# Patient Record
Sex: MALE | Race: WHITE | NOT HISPANIC OR LATINO | Employment: OTHER | URBAN - NONMETROPOLITAN AREA
[De-identification: names, ages, dates, MRNs, and addresses within clinical notes are randomized per-mention and may not be internally consistent; named-entity substitution may affect disease eponyms.]

---

## 2017-03-09 ENCOUNTER — TELEMEDICINE2 (OUTPATIENT)
Dept: MEDICAL GROUP | Facility: PHYSICIAN GROUP | Age: 67
End: 2017-03-09
Payer: MEDICARE

## 2017-03-09 ENCOUNTER — TELEMEDICINE ORIGINATING SITE VISIT (OUTPATIENT)
Dept: MEDICAL GROUP | Facility: CLINIC | Age: 67
End: 2017-03-09

## 2017-03-09 VITALS
SYSTOLIC BLOOD PRESSURE: 132 MMHG | BODY MASS INDEX: 29.82 KG/M2 | DIASTOLIC BLOOD PRESSURE: 84 MMHG | HEIGHT: 73 IN | RESPIRATION RATE: 16 BRPM | WEIGHT: 225 LBS | OXYGEN SATURATION: 94 % | HEART RATE: 63 BPM | TEMPERATURE: 98.6 F

## 2017-03-09 DIAGNOSIS — Z01.818 PREOPERATIVE CLEARANCE: ICD-10-CM

## 2017-03-09 DIAGNOSIS — R94.31 ABNORMAL EKG: ICD-10-CM

## 2017-03-09 DIAGNOSIS — I10 ESSENTIAL HYPERTENSION: ICD-10-CM

## 2017-03-09 PROCEDURE — 99203 OFFICE O/P NEW LOW 30 MIN: CPT | Mod: GT | Performed by: NURSE PRACTITIONER

## 2017-03-09 RX ORDER — LOTEPREDNOL ETABONATE 5 MG/ML
SUSPENSION/ DROPS OPHTHALMIC
COMMUNITY
Start: 2017-02-28 | End: 2018-11-19

## 2017-03-09 RX ORDER — LISINOPRIL 20 MG/1
20 TABLET ORAL DAILY
Status: ON HOLD | COMMUNITY
End: 2018-11-28

## 2017-03-09 ASSESSMENT — PATIENT HEALTH QUESTIONNAIRE - PHQ9: CLINICAL INTERPRETATION OF PHQ2 SCORE: 0

## 2017-03-09 NOTE — MR AVS SNAPSHOT
"Romario Chaudhari   3/9/2017 1:20 PM   Telemedicine2   MRN: [id number]    Department:  [facility]   Dept Phone:  [phone]    Description:  Male : 1950   Provider:  ALISTAIR Bernal; TELEMED YESSI           Reason for Visit     Other surgical clearance      Allergies as of 3/9/2017     No Known Allergies      Vital Signs     Blood Pressure Pulse Temperature Respirations Height Weight    132/84 mmHg 63 37 °C (98.6 °F) 16 1.854 m (6' 0.99\") 102.059 kg (225 lb)    Body Mass Index Oxygen Saturation Smoking Status             29.69 kg/m2 94% Never Smoker          Basic Information     Date Of Birth Sex Race Ethnicity Preferred Language    1950 Male White Non- English      Health Maintenance     Patient has no pending health maintenance at this time      Current Immunizations     No immunizations on file.      Below and/or attached are the medications your provider expects you to take. Review all of your home medications and newly ordered medications with your provider and/or pharmacist. Follow medication instructions as directed by your provider and/or pharmacist. Please keep your medication list with you and share with your provider. Update the information when medications are discontinued, doses are changed, or new medications (including over-the-counter products) are added; and carry medication information at all times in the event of emergency situations     Allergies:  No Known Allergies          Medications  Valid as of: 2017 -  1:55 PM    Generic Name Brand Name Tablet Size Instructions for use    Lisinopril (Tab) PRINIVIL 20 MG Take 20 mg by mouth every day.        Loteprednol Etabonate (Suspension) LOTEMAX 0.5 %         Verapamil HCl   Take  by mouth.        .                 Medicines prescribed today were sent to:     [facility] - [place], [place] - [address]    [address] [place] [address]    Phone: [phone] Fax: [phone]    Open 24 " Hours?: No      Medication refill instructions:       If your prescription bottle indicates you have medication refills left, it is not necessary to call your provider’s office. Please contact your pharmacy and they will refill your medication.    If your prescription bottle indicates you do not have any refills left, you may request refills at any time through one of the following ways: The online Turf Ge[employer] system (except Urgent Care), by calling your provider’s office, or by asking your pharmacy to contact your provider’s office with a refill request. Medication refills are processed only during regular business hours and may not be available until the next business day. Your provider may request additional information or to have a follow-up visit with you prior to refilling your medication.   *Please Note: Medication refills are assigned a new Rx number when refilled electronically. Your pharmacy may indicate that no refills were authorized even though a new prescription for the same medication is available at the pharmacy. Please request the medicine by name with the pharmacy before contacting your provider for a refill.           Turf Ge[employer] Club Access Code: [email or web address]  Expires: 4/8/2017  1:55 PM    Turf Geography Club  A secure, online tool to manage your health information     [employer]’s Turf Geography Club® is a secure, online tool that connects you to your personalized health information from the privacy of your home -- day or night - making it very easy for you to manage your healthcare. Once the activation process is completed, you can even access your medical information using the Turf Geography Club maría, which is available for free in the Apple María store or Google Play store.     Turf Geography Club provides the following levels of access (as shown below):   My Chart Features   [facility] Primary [facility] Doctor [facility]  Specialists [facility]  Urgent  Care Non-[facility]  Primary Care  Doctor   Email your healthcare team securely and privately 24/7 X X X    Manage  appointments: schedule your next appointment; view details of past/upcoming appointments X      Request prescription refills. X      View recent personal medical records, including lab and immunizations X X X X   View health record, including health history, allergies, medications X X X X   Read reports about your outpatient visits, procedures, consult and ER notes X X X X   See your discharge summary, which is a recap of your hospital and/or ER visit that includes your diagnosis, lab results, and care plan. X X       How to register for Quinju.com:  1. Go to  [email or web address].  2. Click on the Sign Up Now box, which takes you to the New Member Sign Up page. You will need to provide the following information:  a. Enter your Quinju.com Access Code exactly as it appears at the top of this page. (You will not need to use this code after you’ve completed the sign-up process. If you do not sign up before the expiration date, you must request a new code.)   b. Enter your date of birth.   c. Enter your home email address.   d. Click Submit, and follow the next screen’s instructions.  3. Create a Quinju.com ID. This will be your Quinju.com login ID and cannot be changed, so think of one that is secure and easy to remember.  4. Create a Quinju.com password. You can change your password at any time.  5. Enter your Password Reset Question and Answer. This can be used at a later time if you forget your password.   6. Enter your e-mail address. This allows you to receive e-mail notifications when new information is available in Quinju.com.  7. Click Sign Up. You can now view your health information.    For assistance activating your Quinju.com account, call [phone]

## 2017-03-10 PROBLEM — I10 ESSENTIAL HYPERTENSION: Status: ACTIVE | Noted: 2017-03-10

## 2017-03-10 PROBLEM — Z01.818 PREOPERATIVE CLEARANCE: Status: ACTIVE | Noted: 2017-03-10

## 2017-03-10 PROBLEM — R94.31 ABNORMAL EKG: Status: ACTIVE | Noted: 2017-03-10

## 2017-03-11 NOTE — ASSESSMENT & PLAN NOTE
Patient is here to obtain clearance for a pending surgery. He is under the impression that I can clear him for pending orthopedic surgery. He is visible upset regarding the fact that I cannot clear him of his cardiac condition. Apparently, he is a patient of a local MD in [place], Dr. Burns. The paper work I have is a piece of paper requesting Cardiologist's clearance for surgery.  Patient states he has a known problem with his heart, and is cleared every time for surgery. I reviewed his EKG's and for the past 4 years they have had changes.   I explained to the patient he will have to see the Cardiologist. He was not very happy regarding this requirement and had words with our staff. I later asked him to please apologize to staff. I will refer him to Cardiology, unfortunately I was unable to to exam and obtain a thorough history.

## 2017-03-13 NOTE — PROGRESS NOTES
Chief Complaint   Patient presents with   • Other     surgical clearance       HISTORY OF PRESENT ILLNESS: Patient is a 66 y.o. male [place], NEW patient, who presents today to discuss:  Verified Identification with patient.  Secured video conference with RN presenter in [place], [place]        Preoperative clearance  Patient is here to obtain clearance for a pending surgery. He is under the impression that I can clear him for pending orthopedic surgery. He is visible upset regarding the fact that I cannot clear him of his cardiac condition. Apparently, he is a patient of a local MD in [place], Dr. Burns. The paper work I have is a piece of paper requesting Cardiologist's clearance for surgery.  Patient states he has a known problem with his heart, and is cleared every time for surgery. I reviewed his EKG's and for the past 4 years they have had changes.   I explained to the patient he will have to see the Cardiologist. He was not very happy regarding this requirement and had words with our staff. I later asked him to please apologize to staff. I will refer him to Cardiology, unfortunately I was unable to to exam and obtain a thorough history.     Essential hypertension  Patient is identified a having HTN. He is taking Lisinopril and Verapamil.   No chest pain or SOB.     Abnormal EKG  Patient has 4 EKGS to compare . The most recent EKG is Jan 2017. Patient has RBB with AV block first degree.         Allergies:Review of patient's allergies indicates no known allergies.    Current Outpatient Prescriptions Ordered in [facility]   Medication Sig Dispense Refill   • lisinopril (PRINIVIL) 20 MG Tab Take 20 mg by mouth every day.     • VERAPAMIL HCL ER PO Take  by mouth.     • LOTEMAX 0.5 % ophthalmic suspension        No current Epic-ordered facility-administered medications on file.       Past Medical History   Diagnosis Date   • Hypertension        Social History   Substance Use Topics   • Smoking status: Never Smoker    •  "Smokeless tobacco: Never Used   • Alcohol Use: No       No family status information on file.   History reviewed. No pertinent family history.    ROS: As documented in my HPI      Exam:  Blood pressure 132/84, pulse 63, temperature 37 °C (98.6 °F), resp. rate 16, height 1.854 m (6' 0.99\"), weight 102.059 kg (225 lb), SpO2 94 %.  General:  Well nourished, well developed male .  Head: No lesions  Hard of hearing. Wears hearing aids.  Neck: Supple.   Pulmonary:  Normal effort.   Cardiovascular: Regular rate   Extremities: no clubbing, cyanosis, or edema.  Psych: Alert and oriented x3. IRRITABLE MOOD  Neurological: No focal deficits    Please note that this dictation was created using voice recognition software. I have made every reasonable attempt to correct obvious errors, but I expect that there are errors of grammar and possibly content that I did not discover before finalizing the note.    Assessment/Plan:  1. Preoperative clearance   Cardiology referral    2. Essential hypertension  Current status of condition is chronic and controlled on therapy.     3. Abnormal EKG  Referred to cardiology            "

## 2017-03-13 NOTE — ASSESSMENT & PLAN NOTE
Patient has 4 EKGS to compare . The most recent EKG is Jan 2017. Patient has RBB with AV block first degree.

## 2017-03-13 NOTE — ASSESSMENT & PLAN NOTE
Patient is identified a having HTN. He is taking Lisinopril and Verapamil.   No chest pain or SOB.

## 2017-03-22 ENCOUNTER — TELEPHONE (OUTPATIENT)
Dept: CARDIOLOGY | Facility: MEDICAL CENTER | Age: 67
End: 2017-03-22

## 2017-03-22 ENCOUNTER — TELEMEDICINE ORIGINATING SITE VISIT (OUTPATIENT)
Dept: MEDICAL GROUP | Facility: CLINIC | Age: 67
End: 2017-03-22
Payer: MEDICARE

## 2017-03-22 ENCOUNTER — TELEMEDICINE2 (OUTPATIENT)
Dept: SCHEDULING | Facility: IMAGING CENTER | Age: 67
End: 2017-03-22
Payer: MEDICARE

## 2017-03-22 ENCOUNTER — NON-PROVIDER VISIT (OUTPATIENT)
Dept: MEDICAL GROUP | Facility: CLINIC | Age: 67
End: 2017-03-22
Payer: MEDICARE

## 2017-03-22 VITALS
TEMPERATURE: 96.9 F | RESPIRATION RATE: 16 BRPM | WEIGHT: 224 LBS | SYSTOLIC BLOOD PRESSURE: 128 MMHG | OXYGEN SATURATION: 94 % | DIASTOLIC BLOOD PRESSURE: 86 MMHG | HEIGHT: 73 IN | HEART RATE: 64 BPM | BODY MASS INDEX: 29.69 KG/M2

## 2017-03-22 DIAGNOSIS — I10 ESSENTIAL HYPERTENSION: ICD-10-CM

## 2017-03-22 DIAGNOSIS — Z01.818 PREOPERATIVE CLEARANCE: ICD-10-CM

## 2017-03-22 DIAGNOSIS — R94.31 ABNORMAL EKG: ICD-10-CM

## 2017-03-22 LAB — EKG 4674: NORMAL

## 2017-03-22 PROCEDURE — 93005 ELECTROCARDIOGRAM TRACING: CPT | Performed by: FAMILY MEDICINE

## 2017-03-22 PROCEDURE — 99204 OFFICE O/P NEW MOD 45 MIN: CPT | Mod: GT | Performed by: INTERNAL MEDICINE

## 2017-03-22 ASSESSMENT — ENCOUNTER SYMPTOMS
ABDOMINAL PAIN: 0
BLURRED VISION: 0
NAUSEA: 0
NERVOUS/ANXIOUS: 1
COUGH: 0
LOSS OF CONSCIOUSNESS: 0
DEPRESSION: 1
HEMOPTYSIS: 0
PALPITATIONS: 0
FEVER: 0
WHEEZING: 0
EYE PAIN: 0
BRUISES/BLEEDS EASILY: 0
CHILLS: 0
SPEECH CHANGE: 0
VOMITING: 0
MYALGIAS: 0
EYE DISCHARGE: 0

## 2017-03-22 NOTE — MR AVS SNAPSHOT
"Romario Chaudhari   3/22/2017 9:00 AM   Telemedicine2   MRN: [id number]    Department:  [facility]   Dept Phone:  [phone]    Description:  Male : 1950   [facility]  [facility]           Reason for Visit     New Patient           Allergies as of 3/22/2017     No Known Allergies      You were diagnosed with     Preoperative clearance   [758155]       Essential hypertension   [2965174]       Abnormal EKG   [786202]         Vital Signs     Blood Pressure Pulse Temperature Respirations Height Weight    128/86 mmHg 64 36.1 °C (96.9 °F) 16 1.854 m (6' 1\") 101.606 kg (224 lb)    Body Mass Index Oxygen Saturation Smoking Status             29.56 kg/m2 94% Never Smoker          Basic Information     Date Of Birth Sex Race Ethnicity Preferred Language    1950 Male White Non- English      Your appointments     Mar 23, 2017  2:20 PM   Telemedicine Clinic Established Pt with HERNAN BernalPJESSICA, [facility]    [address] [place] [id number] [phone]              Problem List              ICD-10-CM Priority Class Noted - Resolved    Preoperative clearance Z01.818   3/10/2017 - Present    Essential hypertension I10   3/10/2017 - Present    Abnormal EKG R94.31   3/10/2017 - Present      Health Maintenance        Date Due Completion Dates    IMM DTaP/Tdap/Td Vaccine (1 - Tdap) 1969 ---    COLONOSCOPY 2000 ---    IMM ZOSTER VACCINE 2010 ---    IMM PNEUMOCOCCAL 65+ (ADULT) LOW/MEDIUM RISK SERIES (1 of 2 - PCV13) 2015 ---    IMM INFLUENZA (1) 2016 ---            Current Immunizations     No immunizations on file.      Below and/or attached are the medications your provider expects you to take. Review all of your home medications and newly ordered medications with your provider and/or pharmacist. Follow medication instructions as directed by your " provider and/or pharmacist. Please keep your medication list with you and share with your provider. Update the information when medications are discontinued, doses are changed, or new medications (including over-the-counter products) are added; and carry medication information at all times in the event of emergency situations     Allergies:  No Known Allergies          Medications  Valid as of: March 22, 2017 -  9:43 AM    Generic Name Brand Name Tablet Size Instructions for use    Lisinopril (Tab) PRINIVIL 20 MG Take 20 mg by mouth every day.        Loteprednol Etabonate (Suspension) LOTEMAX 0.5 %         Verapamil HCl   Take  by mouth.        .                 Medicines prescribed today were sent to:     [facility] - [place], [place] - [address]    [address] [place] [address]    Phone: [phone] Fax: [phone]    Open 24 Hours?: No      Medication refill instructions:       If your prescription bottle indicates you have medication refills left, it is not necessary to call your provider’s office. Please contact your pharmacy and they will refill your medication.    If your prescription bottle indicates you do not have any refills left, you may request refills at any time through one of the following ways: The online [employer]co system (except Urgent Care), by calling your provider’s office, or by asking your pharmacy to contact your provider’s office with a refill request. Medication refills are processed only during regular business hours and may not be available until the next business day. Your provider may request additional information or to have a follow-up visit with you prior to refilling your medication.   *Please Note: Medication refills are assigned a new Rx number when refilled electronically. Your pharmacy may indicate that no refills were authorized even though a new prescription for the same medication is available at the pharmacy. Please request the medicine by name with the  pharmacy before contacting your provider for a refill.           [employer] Access Code: [email or web address]  Expires: 4/8/2017  2:55 PM    [employer]  A secure, online tool to manage your health information     [employer]’s Futura [employer]® is a secure, online tool that connects you to your personalized health information from the privacy of your home -- day or night - making it very easy for you to manage your healthcare. Once the activation process is completed, you can even access your medical information using the Futura [employer] maría, which is available for free in the Apple María store or Google Play store.     Fut[employer] provides the following levels of access (as shown below):   My Chart Features   [facility] Primary Care Doctor [facility]  Specialists [facility]  Urgent  Care Non-[facility]  Primary Care  Doctor   Email your healthcare team securely and privately 24/7 X X X    Manage appointments: schedule your next appointment; view details of past/upcoming appointments X      Request prescription refills. X      View recent personal medical records, including lab and immunizations X X X X   View health record, including health history, allergies, medications X X X X   Read reports about your outpatient visits, procedures, consult and ER notes X X X X   See your discharge summary, which is a recap of your hospital and/or ER visit that includes your diagnosis, lab results, and care plan. X X       How to register for [employer]:  1. Go to  [email or web address].  2. Click on the Sign Up Now box, which takes you to the New Member Sign Up page. You will need to provide the following information:  a. Enter your [employer] Access Code exactly as it appears at the top of this page. (You will not need to use this code after you’ve completed the sign-up process. If you do not sign up before the expiration date, you must request a new code.)   b. Enter your date of birth.   c. Enter your home email address.   d. Click Submit, and follow the next screen’s  instructions.  3. Create a LeddarTecht ID. This will be your LeddarTecht login ID and cannot be changed, so think of one that is secure and easy to remember.  4. Create a LeddarTecht password. You can change your password at any time.  5. Enter your Password Reset Question and Answer. This can be used at a later time if you forget your password.   6. Enter your e-mail address. This allows you to receive e-mail notifications when new information is available in [employer].  7. Click Sign Up. You can now view your health information.    For assistance activating your [employer] account, call [phone]

## 2017-03-22 NOTE — PROGRESS NOTES
Romario Chaudhari is a 66 y.o. male here for a non-provider visit for EKG     If abnormal was an in office provider notified today (if so, indicate provider)? Yes  Routed to PCP? Yes

## 2017-03-22 NOTE — MR AVS SNAPSHOT
Romario Chaudhari   3/22/2017 8:45 AM   Non-Provider Visit   MRN: [id number]    Department:  [facility]   Dept Phone:  [phone]    Description:  Male : 1950   Provider:  [place] G[place]           Reason for Visit     Other Surgical Clearance      Allergies as of 3/22/2017     No Known Allergies      You were diagnosed with     Abnormal EKG   [283398]         Vital Signs     Smoking Status                   Never Smoker            Basic Information     Date Of Birth Sex Race Ethnicity Preferred Language    1950 Male White Non- English      Your appointments     Mar 22, 2017  9:00 AM   Telemedicine Clinic New Patient with Beck Judge M.D., [facility]   Centralized Scheduling (--)    [address]  [place] [id number] [phone]              Problem List              ICD-10-CM Priority Class Noted - Resolved    Preoperative clearance Z01.818   3/10/2017 - Present    Essential hypertension I10   3/10/2017 - Present    Abnormal EKG R94.31   3/10/2017 - Present      Health Maintenance        Date Due Completion Dates    IMM DTaP/Tdap/Td Vaccine (1 - Tdap) 1969 ---    COLONOSCOPY 2000 ---    IMM ZOSTER VACCINE 2010 ---    IMM PNEUMOCOCCAL 65+ (ADULT) LOW/MEDIUM RISK SERIES (1 of 2 - PCV13) 2015 ---    IMM INFLUENZA (1) 2016 ---            Current Immunizations     No immunizations on file.      Below and/or attached are the medications your provider expects you to take. Review all of your home medications and newly ordered medications with your provider and/or pharmacist. Follow medication instructions as directed by your provider and/or pharmacist. Please keep your medication list with you and share with your provider. Update the information when medications are discontinued, doses are changed, or new medications (including over-the-counter products) are added; and carry medication information at all times in  the event of emergency situations     Allergies:  No Known Allergies          Medications  Valid as of: March 22, 2017 -  8:56 AM    Generic Name Brand Name Tablet Size Instructions for use    Lisinopril (Tab) PRINIVIL 20 MG Take 20 mg by mouth every day.        Loteprednol Etabonate (Suspension) LOTEMAX 0.5 %         Verapamil HCl   Take  by mouth.        .                 Medicines prescribed today were sent to:     [facility] - [place], [place] - [address]    [address] [place] [address]    Phone: [phone] Fax: [phone]    Open 24 Hours?: No      Medication refill instructions:       If your prescription bottle indicates you have medication refills left, it is not necessary to call your provider’s office. Please contact your pharmacy and they will refill your medication.    If your prescription bottle indicates you do not have any refills left, you may request refills at any time through one of the following ways: The online [employer] system (except Urgent Care), by calling your provider’s office, or by asking your pharmacy to contact your provider’s office with a refill request. Medication refills are processed only during regular business hours and may not be available until the next business day. Your provider may request additional information or to have a follow-up visit with you prior to refilling your medication.   *Please Note: Medication refills are assigned a new Rx number when refilled electronically. Your pharmacy may indicate that no refills were authorized even though a new prescription for the same medication is available at the pharmacy. Please request the medicine by name with the pharmacy before contacting your provider for a refill.           [employer] Access Code: [email or web address]  Expires: 4/8/2017  2:55 PM    [employer]  A secure, online tool to manage your health information     [employer]’s MapR Technologies® is a secure, online tool that connects you to your personalized  health information from the privacy of your home -- day or night - making it very easy for you to manage your healthcare. Once the activation process is completed, you can even access your medical information using the FlameStower maría, which is available for free in the Apple María store or Google Play store.     FlameStower provides the following levels of access (as shown below):   My Chart Features   [facility] Primary [facility] Doctor [facility]  Specialists [facility]  Urgent  Care Non-[facility]  Primary Care  Doctor   Email your healthcare team securely and privately 24/7 X X X    Manage appointments: schedule your next appointment; view details of past/upcoming appointments X      Request prescription refills. X      View recent personal medical records, including lab and immunizations X X X X   View health record, including health history, allergies, medications X X X X   Read reports about your outpatient visits, procedures, consult and ER notes X X X X   See your discharge summary, which is a recap of your hospital and/or ER visit that includes your diagnosis, lab results, and care plan. X X       How to register for FlameStower:  1. Go to  [email or web address].  2. Click on the Sign Up Now box, which takes you to the New Member Sign Up page. You will need to provide the following information:  a. Enter your FlameStower Access Code exactly as it appears at the top of this page. (You will not need to use this code after you’ve completed the sign-up process. If you do not sign up before the expiration date, you must request a new code.)   b. Enter your date of birth.   c. Enter your home email address.   d. Click Submit, and follow the next screen’s instructions.  3. Create a FlameStower ID. This will be your FlameStower login ID and cannot be changed, so think of one that is secure and easy to remember.  4. Create a FlameStower password. You can change your password at any time.  5. Enter your Password Reset Question and Answer. This can be used at a  later time if you forget your password.   6. Enter your e-mail address. This allows you to receive e-mail notifications when new information is available in [employer].  7. Click Sign Up. You can now view your health information.    For assistance activating your [employer] account, call [phone]

## 2017-03-22 NOTE — PROGRESS NOTES
Subjective:   Romario Chaudhari is a 66 y.o. male who presents today for new patient evaluation. He needs surgical clearance for orthopedic surgery on his right arm. He has a history of hypertension and a right bundle branch block.    He has had no chest discomfort, dyspnea on exertion, PND or edema. He denies any palpitations or lightheadedness.      Past Medical History   Diagnosis Date   • Hypertension    • Diabetes (CMS-HCC)      Past Surgical History   Procedure Laterality Date   • Cervical laminectomy posterior     • Lumbar laminectomy diskectomy     • Carpal tunnel release Bilateral    • Nerve ulnar transfer Bilateral      Family History   Problem Relation Age of Onset   • Heart Attack Father 86     History   Smoking status   • Never Smoker    Smokeless tobacco   • Never Used     No Known Allergies  Outpatient Encounter Prescriptions as of 3/22/2017   Medication Sig Dispense Refill   • lisinopril (PRINIVIL) 20 MG Tab Take 20 mg by mouth every day.     • VERAPAMIL HCL ER PO Take  by mouth.     • LOTEMAX 0.5 % ophthalmic suspension        No facility-administered encounter medications on file as of 3/22/2017.     Review of Systems   Constitutional: Negative for fever and chills.   HENT: Positive for congestion and hearing loss.    Eyes: Negative for blurred vision, pain and discharge.   Respiratory: Negative for cough, hemoptysis and wheezing.    Cardiovascular: Negative for chest pain and palpitations.   Gastrointestinal: Negative for nausea, vomiting and abdominal pain.   Musculoskeletal: Positive for joint pain (especially knees). Negative for myalgias.   Skin: Negative for itching and rash.   Neurological: Negative for speech change and loss of consciousness.   Endo/Heme/Allergies: Does not bruise/bleed easily.   Psychiatric/Behavioral: Positive for depression. The patient is nervous/anxious.    All other systems reviewed and are negative.       Objective:   /86 mmHg  Pulse 64  Temp(Src) 36.1 °C (96.9  "°F)  Resp 16  Ht 1.854 m (6' 1\")  Wt 101.606 kg (224 lb)  BMI 29.56 kg/m2  SpO2 94%    Physical Exam   Constitutional: He is oriented to person, place, and time. He appears well-developed. No distress.   HENT:   Mouth/Throat: Mucous membranes are normal.   Eyes: Conjunctivae and EOM are normal.   Neck: No JVD present. No tracheal deviation present. No thyroid mass and no thyromegaly present.   Cardiovascular: Normal rate, regular rhythm and intact distal pulses.    No murmur heard.  Pulmonary/Chest: Effort normal and breath sounds normal. No respiratory distress. He exhibits no tenderness.   Abdominal: Soft. There is no tenderness.   Musculoskeletal: Normal range of motion. He exhibits no edema.   Neurological: He is alert and oriented to person, place, and time. He has normal strength. He displays no tremor.   Skin: Skin is warm and dry. He is not diaphoretic.   Psychiatric: He has a normal mood and affect. His behavior is normal.   Vitals reviewed.    EKG from March 22: This shows a normal sinus rhythm with a right bundle branch block pattern.    Assessment:     1. Preoperative clearance     2. Essential hypertension     3. Abnormal EKG         Medical Decision Making:  Today's Assessment / Status / Plan:     Mr. Chaudhari is clinically stable. I feel he can proceed to general anesthesia and orthopedic surgery without any further cardiac evaluation. However, he does have hypertension and a mildly abnormal EKG. We discussed further evaluation in the future. I feel that he should have a echocardiogram in the future. [facility] [place] and [facility] get in touch with them about evaluation.  "

## 2017-03-23 ENCOUNTER — TELEMEDICINE2 (OUTPATIENT)
Dept: MEDICAL GROUP | Facility: PHYSICIAN GROUP | Age: 67
End: 2017-03-23
Payer: MEDICARE

## 2017-03-23 ENCOUNTER — APPOINTMENT (OUTPATIENT)
Dept: RADIOLOGY | Facility: IMAGING CENTER | Age: 67
End: 2017-03-23
Attending: NURSE PRACTITIONER
Payer: MEDICARE

## 2017-03-23 ENCOUNTER — TELEMEDICINE ORIGINATING SITE VISIT (OUTPATIENT)
Dept: MEDICAL GROUP | Facility: CLINIC | Age: 67
End: 2017-03-23
Payer: MEDICARE

## 2017-03-23 VITALS
TEMPERATURE: 98.1 F | BODY MASS INDEX: 29.29 KG/M2 | HEART RATE: 88 BPM | RESPIRATION RATE: 16 BRPM | DIASTOLIC BLOOD PRESSURE: 84 MMHG | HEIGHT: 73 IN | SYSTOLIC BLOOD PRESSURE: 131 MMHG | OXYGEN SATURATION: 97 % | WEIGHT: 221 LBS

## 2017-03-23 DIAGNOSIS — M25.511 ACUTE PAIN OF RIGHT SHOULDER: ICD-10-CM

## 2017-03-23 DIAGNOSIS — Z01.818 PREOPERATIVE CLEARANCE: ICD-10-CM

## 2017-03-23 DIAGNOSIS — R94.31 ABNORMAL EKG: ICD-10-CM

## 2017-03-23 DIAGNOSIS — I10 ESSENTIAL HYPERTENSION: ICD-10-CM

## 2017-03-23 PROCEDURE — 73030 X-RAY EXAM OF SHOULDER: CPT | Mod: 26,RT | Performed by: FAMILY MEDICINE

## 2017-03-23 PROCEDURE — 99213 OFFICE O/P EST LOW 20 MIN: CPT | Mod: GT | Performed by: NURSE PRACTITIONER

## 2017-03-23 ASSESSMENT — PATIENT HEALTH QUESTIONNAIRE - PHQ9: CLINICAL INTERPRETATION OF PHQ2 SCORE: 0

## 2017-03-23 NOTE — ASSESSMENT & PLAN NOTE
This is a 66-year-old male who is here reporting acute pain to his shoulder. He reports that 2 months ago he was on his abdomen reaching enclosed space and felt pain in the shoulder. He tried to rehabilitation it back on his own still is having decreased movement, pain, and weakness. He has a history of shoulder injury years back. And was told that his rotator cuff on the right side was barely operable.  His left side was repaired right remains with continued problem.    Patient is planning a surgery on his hand with a hand surgeon next month. He is not sure that this particular physician will be handling the rest of his orthopedic problems.    I outlined a plan. Patient will obtain x-ray of shoulder today followed by MRI which needs to be open.

## 2017-03-23 NOTE — ASSESSMENT & PLAN NOTE
Symptoms:  Headache NO , Weakness NO , Visual loss NO , Chest pain NO , Dyspnea NO , Claudication NO Swelling NO   Taking medication: Lisinopril 20 mg   Diet : standard diet.  Exercise: limited.

## 2017-03-23 NOTE — ASSESSMENT & PLAN NOTE
Patient was seen by cardiology and given Pre-operative clearance.  It looks like his cardiologist Dr. Mei gave clearance and sent paperwork.  Patient is to follow up after surgery.

## 2017-03-23 NOTE — ASSESSMENT & PLAN NOTE
Today patient's notes are read regarding EKG. RBBB, patient is asked to come back to be seen by cardiology. Patient agrees to address.

## 2017-03-23 NOTE — PROGRESS NOTES
Chief Complaint   Patient presents with   • Shoulder Pain       HISTORY OF PRESENT ILLNESS: Patient is a 66 y.o. male [facility] established patient, who presents today to discuss:  Verified Identification with patient.  Secured video conference with RN presenter in [place], [place]        Preoperative clearance  Patient was seen by cardiology and given Pre-operative clearance.  It looks like his cardiologist Dr. Mei gave clearance and sent paperwork.  Patient is to follow up after surgery.     Essential hypertension  Symptoms:  Headache NO , Weakness NO , Visual loss NO , Chest pain NO , Dyspnea NO , Claudication NO Swelling NO   Taking medication: Lisinopril 20 mg   Diet : standard diet.  Exercise: limited.       Abnormal EKG  Today patient's notes are read regarding EKG. RBBB, patient is asked to come back to be seen by cardiology. Patient agrees to address.     Acute pain of right shoulder  This is a 66-year-old male who is here reporting acute pain to his shoulder. He reports that 2 months ago he was on his abdomen reaching enclosed space and felt pain in the shoulder. He tried to rehabilitation it back on his own still is having decreased movement, pain, and weakness. He has a history of shoulder injury years back. And was told that his rotator cuff on the right side was barely operable.  His left side was repaired right remains with continued problem.    Patient is planning a surgery on his hand with a hand surgeon next month. He is not sure that this particular physician will be handling the rest of his orthopedic problems.    I outlined a plan. Patient will obtain x-ray of shoulder today followed by MRI which needs to be open.        Allergies:Review of patient's allergies indicates no known allergies.    Current Outpatient Prescriptions Ordered in [facility]   Medication Sig Dispense Refill   • lisinopril (PRINIVIL) 20 MG Tab Take 20 mg by mouth every day.     • VERAPAMIL HCL ER PO Take  by mouth.     • LOTEMAX  "0.5 % ophthalmic suspension        No current Epic-ordered facility-administered medications on file.       Past Medical History   Diagnosis Date   • Hypertension    • Diabetes (CMS-HCC)    • Upper GI bleed 2007       Social History   Substance Use Topics   • Smoking status: Never Smoker    • Smokeless tobacco: Never Used   • Alcohol Use: No       No family status information on file.     Family History   Problem Relation Age of Onset   • Heart Attack Father 86       ROS: As documented in my HPI      Exam:  Blood pressure 131/84, pulse 88, temperature 36.7 °C (98.1 °F), resp. rate 16, height 1.854 m (6' 0.99\"), weight 100.245 kg (221 lb), SpO2 97 %.  General:  Well nourished, well developed male in NAD  Head: No lesions, Non tender scalp  Neck: Supple  Pulmonary:  Normal effort.   Cardiovascular: Regular rate   Extremities: Right shoulder. Painful abduction. Limited range of motion cannot got beyond 45°. Strength and tone 3 out of 5 right side. Gross sensation decreased.  Psych: Alert and oriented x3. Normal mood and affect.   Neurological: No focal deficits    Please note that this dictation was created using voice recognition software. I have made every reasonable attempt to correct obvious errors, but I expect that there are errors of grammar and possibly content that I did not discover before finalizing the note.    Assessment/Plan:  1. Preoperative clearance   cleared per cardiology    2. Acute pain of right shoulder  DX-SHOULDER 2+ RIGHT    MR-SHOULDER-W/O RIGHT   3. Essential hypertension   Current status of condition is chronic and controlled on therapy.     4. Abnormal EKG   will follow up with cardiology after surgery.             "

## 2017-03-23 NOTE — MR AVS SNAPSHOT
"Romario Chaudhari   3/23/2017 2:20 PM   Telemedicine2   MRN: [id number]    Department:  [facility]   Dept Phone:  [phone]    Description:  Male : 1950   Provider:  ALISTAIR Bernal; TELEMED [place]           Reason for Visit     Shoulder Pain           Allergies as of 3/23/2017     No Known Allergies      You were diagnosed with     Preoperative clearance   [553242]       Acute pain of right shoulder   [4199635]       Essential hypertension   [0722198]       Abnormal EKG   [209925]         Vital Signs     Blood Pressure Pulse Temperature Respirations Height Weight    131/84 mmHg 88 36.7 °C (98.1 °F) 16 1.854 m (6' 0.99\") 100.245 kg (221 lb)    Body Mass Index Oxygen Saturation Smoking Status             29.16 kg/m2 97% Never Smoker          Basic Information     Date Of Birth Sex Race Ethnicity Preferred Language    1950 Male White Non- English      Your appointments     Mar 23, 2017  2:45 PM   [place] 15 with [place]   [facility] [place]    [address]  [place] [address]                 Problem List              ICD-10-CM Priority Class Noted - Resolved    Preoperative clearance Z01.818   3/10/2017 - Present    Essential hypertension I10   3/10/2017 - Present    Abnormal EKG R94.31   3/10/2017 - Present    Acute pain of right shoulder M25.511   3/23/2017 - Present      Health Maintenance        Date Due Completion Dates    IMM DTaP/Tdap/Td Vaccine (1 - Tdap) 1969 ---    COLONOSCOPY 2000 ---    IMM ZOSTER VACCINE 2010 ---    IMM PNEUMOCOCCAL 65+ (ADULT) LOW/MEDIUM RISK SERIES (1 of 2 - PCV13) 2015 ---    IMM INFLUENZA (1) 2016 ---            Current Immunizations     No immunizations on file.      Below and/or attached are the medications your provider expects you to take. Review all of your home medications and newly ordered medications with your provider and/or pharmacist. Follow medication instructions as directed by your " provider and/or pharmacist. Please keep your medication list with you and share with your provider. Update the information when medications are discontinued, doses are changed, or new medications (including over-the-counter products) are added; and carry medication information at all times in the event of emergency situations     Allergies:  No Known Allergies          Medications  Valid as of: March 23, 2017 -  2:42 PM    Generic Name Brand Name Tablet Size Instructions for use    Lisinopril (Tab) PRINIVIL 20 MG Take 20 mg by mouth every day.        Loteprednol Etabonate (Suspension) LOTEMAX 0.5 %         Verapamil HCl   Take  by mouth.        .                 Medicines prescribed today were sent to:     [facility] - [place], [place] - [address]    [address] [place] [address]    Phone: [phone] Fax: [phone]    Open 24 Hours?: No      Medication refill instructions:       If your prescription bottle indicates you have medication refills left, it is not necessary to call your provider’s office. Please contact your pharmacy and they will refill your medication.    If your prescription bottle indicates you do not have any refills left, you may request refills at any time through one of the following ways: The online Jetaport system (except Urgent Care), by calling your provider’s office, or by asking your pharmacy to contact your provider’s office with a refill request. Medication refills are processed only during regular business hours and may not be available until the next business day. Your provider may request additional information or to have a follow-up visit with you prior to refilling your medication.   *Please Note: Medication refills are assigned a new Rx number when refilled electronically. Your pharmacy may indicate that no refills were authorized even though a new prescription for the same medication is available at the pharmacy. Please request the medicine by name with the  pharmacy before contacting your provider for a refill.        Your To Do List     Future Labs/Procedures Complete By Expires    DX-SHOULDER 2+ RIGHT  As directed 3/23/2018    MR-SHOULDER-W/O RIGHT  As directed 3/23/2018         Zaplee Access Code: [email or web address]  Expires: 4/8/2017  2:55 PM    Zaplee  A secure, online tool to manage your health information     [employer]’s Zaplee® is a secure, online tool that connects you to your personalized health information from the privacy of your home -- day or night - making it very easy for you to manage your healthcare. Once the activation process is completed, you can even access your medical information using the Zaplee maría, which is available for free in the Apple María store or Google Play store.     Zaplee provides the following levels of access (as shown below):   My Chart Features   [facility]  Urgent  Care [facility]  Primary Care  Doctor   Email your healthcare team securely and privately 24/7 X X X    Manage appointments: schedule your next appointment; view details of past/upcoming appointments X      Request prescription refills. X      View recent personal medical records, including lab and immunizations X X X X   View health record, including health history, allergies, medications X X X X   Read reports about your outpatient visits, procedures, consult and ER notes X X X X   See your discharge summary, which is a recap of your hospital and/or ER visit that includes your diagnosis, lab results, and care plan. X X       How to register for Zaplee:  1. Go to  [email or web address].  2. Click on the Sign Up Now box, which takes you to the New Member Sign Up page. You will need to provide the following information:  a. Enter your Zaplee Access Code exactly as it appears at the top of this page. (You will not need to use this code after you’ve completed the sign-up process. If you do not sign up before the  expiration date, you must request a new code.)   b. Enter your date of birth.   c. Enter your home email address.   d. Click Submit, and follow the next screen’s instructions.  3. Create a Numecent ID. This will be your Numecent login ID and cannot be changed, so think of one that is secure and easy to remember.  4. Create a Numecent password. You can change your password at any time.  5. Enter your Password Reset Question and Answer. This can be used at a later time if you forget your password.   6. Enter your e-mail address. This allows you to receive e-mail notifications when new information is available in Numecent.  7. Click Sign Up. You can now view your health information.    For assistance activating your Numecent account, call [phone]

## 2017-03-24 ENCOUNTER — TELEPHONE (OUTPATIENT)
Dept: MEDICAL GROUP | Facility: PHYSICIAN GROUP | Age: 67
End: 2017-03-24

## 2017-03-24 NOTE — TELEPHONE ENCOUNTER
Shoulder shows : osteoarthritis of shoulder.  No fracture.  No deformity.  Only commented on bones.  Richelle Ulloa

## 2018-11-19 ENCOUNTER — HOSPITAL ENCOUNTER (INPATIENT)
Facility: MEDICAL CENTER | Age: 68
LOS: 10 days | DRG: 233 | End: 2018-11-29
Attending: EMERGENCY MEDICINE | Admitting: HOSPITALIST
Payer: COMMERCIAL

## 2018-11-19 DIAGNOSIS — J95.821 ACUTE RESPIRATORY FAILURE FOLLOWING TRAUMA AND SURGERY (HCC): ICD-10-CM

## 2018-11-19 DIAGNOSIS — J18.9 PNEUMONIA OF LEFT LOWER LOBE DUE TO INFECTIOUS ORGANISM: ICD-10-CM

## 2018-11-19 DIAGNOSIS — R94.31 ABNORMAL EKG: ICD-10-CM

## 2018-11-19 DIAGNOSIS — I21.4 NSTEMI (NON-ST ELEVATED MYOCARDIAL INFARCTION) (HCC): ICD-10-CM

## 2018-11-19 DIAGNOSIS — G89.18 ACUTE POST-OPERATIVE PAIN: ICD-10-CM

## 2018-11-19 PROBLEM — E66.09 CLASS 1 OBESITY DUE TO EXCESS CALORIES WITHOUT SERIOUS COMORBIDITY WITH BODY MASS INDEX (BMI) OF 30.0 TO 30.9 IN ADULT: Status: ACTIVE | Noted: 2018-11-19

## 2018-11-19 PROBLEM — E66.811 CLASS 1 OBESITY DUE TO EXCESS CALORIES WITHOUT SERIOUS COMORBIDITY WITH BODY MASS INDEX (BMI) OF 30.0 TO 30.9 IN ADULT: Status: ACTIVE | Noted: 2018-11-19

## 2018-11-19 LAB
APTT PPP: 59.7 SEC (ref 24.7–36)
INR PPP: 1.03 (ref 0.87–1.13)
PROTHROMBIN TIME: 13.6 SEC (ref 12–14.6)
TROPONIN I SERPL-MCNC: 0.52 NG/ML (ref 0–0.04)

## 2018-11-19 PROCEDURE — 700105 HCHG RX REV CODE 258: Performed by: HOSPITALIST

## 2018-11-19 PROCEDURE — 700102 HCHG RX REV CODE 250 W/ 637 OVERRIDE(OP): Performed by: INTERNAL MEDICINE

## 2018-11-19 PROCEDURE — 36415 COLL VENOUS BLD VENIPUNCTURE: CPT

## 2018-11-19 PROCEDURE — 99285 EMERGENCY DEPT VISIT HI MDM: CPT

## 2018-11-19 PROCEDURE — A9270 NON-COVERED ITEM OR SERVICE: HCPCS | Performed by: HOSPITALIST

## 2018-11-19 PROCEDURE — 85730 THROMBOPLASTIN TIME PARTIAL: CPT

## 2018-11-19 PROCEDURE — 84484 ASSAY OF TROPONIN QUANT: CPT

## 2018-11-19 PROCEDURE — 770020 HCHG ROOM/CARE - TELE (206)

## 2018-11-19 PROCEDURE — 85610 PROTHROMBIN TIME: CPT

## 2018-11-19 PROCEDURE — 99223 1ST HOSP IP/OBS HIGH 75: CPT | Performed by: INTERNAL MEDICINE

## 2018-11-19 PROCEDURE — 700102 HCHG RX REV CODE 250 W/ 637 OVERRIDE(OP): Performed by: HOSPITALIST

## 2018-11-19 PROCEDURE — 83036 HEMOGLOBIN GLYCOSYLATED A1C: CPT

## 2018-11-19 PROCEDURE — 99223 1ST HOSP IP/OBS HIGH 75: CPT | Mod: AI | Performed by: HOSPITALIST

## 2018-11-19 PROCEDURE — 304561 HCHG STAT O2

## 2018-11-19 PROCEDURE — A9270 NON-COVERED ITEM OR SERVICE: HCPCS | Performed by: INTERNAL MEDICINE

## 2018-11-19 PROCEDURE — 96365 THER/PROPH/DIAG IV INF INIT: CPT

## 2018-11-19 PROCEDURE — 700111 HCHG RX REV CODE 636 W/ 250 OVERRIDE (IP): Performed by: HOSPITALIST

## 2018-11-19 RX ORDER — HEPARIN SODIUM 1000 [USP'U]/ML
7000 INJECTION, SOLUTION INTRAVENOUS; SUBCUTANEOUS ONCE
Status: DISCONTINUED | OUTPATIENT
Start: 2018-11-19 | End: 2018-11-19

## 2018-11-19 RX ORDER — GABAPENTIN 100 MG/1
100 CAPSULE ORAL 2 TIMES DAILY
COMMUNITY

## 2018-11-19 RX ORDER — MORPHINE SULFATE 10 MG/ML
2-4 INJECTION, SOLUTION INTRAMUSCULAR; INTRAVENOUS
Status: DISCONTINUED | OUTPATIENT
Start: 2018-11-19 | End: 2018-11-21

## 2018-11-19 RX ORDER — ASPIRIN 325 MG
325 TABLET ORAL DAILY
Status: DISCONTINUED | OUTPATIENT
Start: 2018-11-20 | End: 2018-11-20

## 2018-11-19 RX ORDER — CARVEDILOL 6.25 MG/1
6.25 TABLET ORAL 2 TIMES DAILY WITH MEALS
Status: DISCONTINUED | OUTPATIENT
Start: 2018-11-19 | End: 2018-11-20

## 2018-11-19 RX ORDER — ONDANSETRON 2 MG/ML
4 INJECTION INTRAMUSCULAR; INTRAVENOUS EVERY 4 HOURS PRN
Status: DISCONTINUED | OUTPATIENT
Start: 2018-11-19 | End: 2018-11-21

## 2018-11-19 RX ORDER — ASPIRIN 81 MG/1
324 TABLET, CHEWABLE ORAL DAILY
Status: DISCONTINUED | OUTPATIENT
Start: 2018-11-20 | End: 2018-11-20

## 2018-11-19 RX ORDER — NITROGLYCERIN 0.4 MG/1
0.4 TABLET SUBLINGUAL
Status: DISCONTINUED | OUTPATIENT
Start: 2018-11-19 | End: 2018-11-21

## 2018-11-19 RX ORDER — POLYETHYLENE GLYCOL 3350 17 G/17G
1 POWDER, FOR SOLUTION ORAL
Status: DISCONTINUED | OUTPATIENT
Start: 2018-11-19 | End: 2018-11-21

## 2018-11-19 RX ORDER — AMOXICILLIN 250 MG
2 CAPSULE ORAL 2 TIMES DAILY
Status: DISCONTINUED | OUTPATIENT
Start: 2018-11-19 | End: 2018-11-21

## 2018-11-19 RX ORDER — ONDANSETRON 4 MG/1
4 TABLET, ORALLY DISINTEGRATING ORAL EVERY 4 HOURS PRN
Status: DISCONTINUED | OUTPATIENT
Start: 2018-11-19 | End: 2018-11-21

## 2018-11-19 RX ORDER — LISINOPRIL 20 MG/1
20 TABLET ORAL DAILY
Status: DISCONTINUED | OUTPATIENT
Start: 2018-11-20 | End: 2018-11-21

## 2018-11-19 RX ORDER — ACETAMINOPHEN 325 MG/1
650 TABLET ORAL EVERY 6 HOURS PRN
Status: DISCONTINUED | OUTPATIENT
Start: 2018-11-19 | End: 2018-11-21

## 2018-11-19 RX ORDER — ATORVASTATIN CALCIUM 80 MG/1
80 TABLET, FILM COATED ORAL EVERY EVENING
Status: DISCONTINUED | OUTPATIENT
Start: 2018-11-19 | End: 2018-11-20

## 2018-11-19 RX ORDER — VERAPAMIL HYDROCHLORIDE 40 MG/1
40 TABLET ORAL DAILY
Status: ON HOLD | COMMUNITY
End: 2018-11-28

## 2018-11-19 RX ORDER — SODIUM CHLORIDE 9 MG/ML
INJECTION, SOLUTION INTRAVENOUS CONTINUOUS
Status: DISCONTINUED | OUTPATIENT
Start: 2018-11-19 | End: 2018-11-20

## 2018-11-19 RX ORDER — ASPIRIN 300 MG/1
300 SUPPOSITORY RECTAL DAILY
Status: DISCONTINUED | OUTPATIENT
Start: 2018-11-20 | End: 2018-11-20

## 2018-11-19 RX ORDER — BISACODYL 10 MG
10 SUPPOSITORY, RECTAL RECTAL
Status: DISCONTINUED | OUTPATIENT
Start: 2018-11-19 | End: 2018-11-21

## 2018-11-19 RX ORDER — HEPARIN SODIUM 1000 [USP'U]/ML
3800 INJECTION, SOLUTION INTRAVENOUS; SUBCUTANEOUS PRN
Status: DISCONTINUED | OUTPATIENT
Start: 2018-11-19 | End: 2018-11-21

## 2018-11-19 RX ADMIN — CARVEDILOL 6.25 MG: 6.25 TABLET, FILM COATED ORAL at 21:43

## 2018-11-19 RX ADMIN — HEPARIN SODIUM 1250 UNITS/HR: 5000 INJECTION, SOLUTION INTRAVENOUS at 22:00

## 2018-11-19 RX ADMIN — SODIUM CHLORIDE: 9 INJECTION, SOLUTION INTRAVENOUS at 21:48

## 2018-11-19 RX ADMIN — NITROGLYCERIN 1 INCH: 20 OINTMENT TOPICAL at 21:42

## 2018-11-19 ASSESSMENT — ENCOUNTER SYMPTOMS
PSYCHIATRIC NEGATIVE: 1
GASTROINTESTINAL NEGATIVE: 1
NEUROLOGICAL NEGATIVE: 1
COUGH: 1
EYES NEGATIVE: 1
MUSCULOSKELETAL NEGATIVE: 1
DIAPHORESIS: 1

## 2018-11-19 ASSESSMENT — LIFESTYLE VARIABLES
DO YOU DRINK ALCOHOL: NO
EVER_SMOKED: NEVER

## 2018-11-19 ASSESSMENT — PAIN SCALES - GENERAL
PAINLEVEL_OUTOF10: 0

## 2018-11-19 NOTE — LETTER
CARDIAC ICU [facility]   [address], [place] [address]  Phone: Dept: [phone] - Fax:        [facility] Progress Report and Disability Certification  Date of Service: 11/19/2018   No Show:  No  Date / Time of Next Visit:     Claim Information   Patient Name: Romario Chaudhari  Claim Number:  [id number]   Employer:   [employer] Date of Injury: 8/3/2007     Insurer / TPA: [employer] ID / SSN: [id number]   Occupation:  Diagnosis: Diagnoses of Abnormal EKG, NSTEMI (non-ST elevated myocardial infarction) ([facility]), Acute respiratory failure following trauma and surgery ([facility]), Pneumonia of left lower lobe due to infectious organism ([facility]), and Acute post-operative pain were pertinent to this visit.    Medical Information   Related to Industrial Injury?      Subjective Complaints:      Objective Findings:     Pre-Existing Condition(s):     Assessment:        Status:    Permanent Disability:     Plan:      Diagnostics:      Comments:       Disability Information   Status:      From:     Through:   Restrictions are:     Physical Restrictions   Sitting:    Standing:    Stooping:    Bending:      Squatting:    Walking:    Climbing:    Pushing:      Pulling:    Other:    Reaching Above Shoulder (L):   Reaching Above Shoulder (R):       Reaching Below Shoulder (L):    Reaching Below Shoulder (R):      Not to exceed Weight Limits   Carrying(hrs):   Weight Limit(lb):   Lifting(hrs):   Weight  Limit(lb):     Comments:      Repetitive Actions   Hands: i.e. Fine Manipulations from Grasping:     Feet: i.e. Operating Foot Controls:     Driving / Operate Machinery:     Physician Name: Lai Sheriff Physician Signature:   e-Signature:  , Medical Director   Clinic Name / Location: [facility]  CARDIAC ICU [facility]  [address] [place] [address]  [phone]     Clinic Phone Number: Dept: [phone]   Appointment Time:  Visit Start Time:      Check-In Time:  7:44 PM Visit Discharge Time: 2:16 PM   Original-Treating Physician or Chiropractor    Page 2-Insurer/TPA    Page 3-Employer    Page 4-Employee

## 2018-11-19 NOTE — LETTER
CARDIAC ICU [facility]   [address], [place] [address]  Phone: Dept: [phone] - Fax:        [facility] Progress Report and Disability Certification  Date of Service: 11/19/2018   No Show:  No  Date / Time of Next Visit:     Claim Information   Patient Name: Romario Chaudhari  Claim Number:  [id number]   Employer:   [employer] Date of Injury: 8/3/2007     Insurer / TPA: [employer] ID / SSN: [id number]   Occupation:  Diagnosis: Diagnoses of Abnormal EKG, NSTEMI (non-ST elevated myocardial infarction) ([facility]), Acute respiratory failure following trauma and surgery ([facility]), Pneumonia of left lower lobe due to infectious organism ([facility]), and Acute post-operative pain were pertinent to this visit.    Medical Information   Related to Industrial Injury?      Subjective Complaints:      Objective Findings:     Pre-Existing Condition(s):     Assessment:        Status:    Permanent Disability:     Plan:      Diagnostics:      Comments:       Disability Information   Status:      From:     Through:   Restrictions are:     Physical Restrictions   Sitting:    Standing:    Stooping:    Bending:      Squatting:    Walking:    Climbing:    Pushing:      Pulling:    Other:    Reaching Above Shoulder (L):   Reaching Above Shoulder (R):       Reaching Below Shoulder (L):    Reaching Below Shoulder (R):      Not to exceed Weight Limits   Carrying(hrs):   Weight Limit(lb):   Lifting(hrs):   Weight  Limit(lb):     Comments:      Repetitive Actions   Hands: i.e. Fine Manipulations from Grasping:     Feet: i.e. Operating Foot Controls:     Driving / Operate Machinery:     Physician Name: Lai Sheriff Physician Signature:   e-Signature:  , Medical Director   Clinic Name / Location: [facility]  CARDIAC ICU [facility]  [address] [place] [id number]  [phone]     Clinic Phone Number: Dept: [phone]   Appointment Time:  Visit Start Time:      Check-In Time:  7:44 PM Visit Discharge Time: 2:16 PM   Original-Treating Physician or Chiropractor    Page 2-Insurer/TPA    Page 3-Employer    Page 4-Employee

## 2018-11-20 ENCOUNTER — APPOINTMENT (OUTPATIENT)
Dept: RADIOLOGY | Facility: MEDICAL CENTER | Age: 68
DRG: 233 | End: 2018-11-20
Attending: NURSE PRACTITIONER
Payer: COMMERCIAL

## 2018-11-20 ENCOUNTER — APPOINTMENT (OUTPATIENT)
Dept: CARDIOLOGY | Facility: MEDICAL CENTER | Age: 68
DRG: 233 | End: 2018-11-20
Attending: NURSE PRACTITIONER
Payer: COMMERCIAL

## 2018-11-20 LAB
ABO GROUP BLD: NORMAL
ABO GROUP BLD: NORMAL
ALBUMIN SERPL BCP-MCNC: 4.1 G/DL (ref 3.2–4.9)
ALBUMIN SERPL BCP-MCNC: 4.5 G/DL (ref 3.2–4.9)
ALBUMIN/GLOB SERPL: 1.4 G/DL
ALBUMIN/GLOB SERPL: 1.5 G/DL
ALP SERPL-CCNC: 103 U/L (ref 30–99)
ALP SERPL-CCNC: 97 U/L (ref 30–99)
ALT SERPL-CCNC: 17 U/L (ref 2–50)
ALT SERPL-CCNC: 20 U/L (ref 2–50)
ANION GAP SERPL CALC-SCNC: 12 MMOL/L (ref 0–11.9)
ANION GAP SERPL CALC-SCNC: 12 MMOL/L (ref 0–11.9)
ANION GAP SERPL CALC-SCNC: 9 MMOL/L (ref 0–11.9)
APTT PPP: 44.4 SEC (ref 24.7–36)
APTT PPP: 56.4 SEC (ref 24.7–36)
APTT PPP: 70.4 SEC (ref 24.7–36)
APTT PPP: 93.4 SEC (ref 24.7–36)
AST SERPL-CCNC: 18 U/L (ref 12–45)
AST SERPL-CCNC: 20 U/L (ref 12–45)
BASOPHILS # BLD AUTO: 0.6 % (ref 0–1.8)
BASOPHILS # BLD AUTO: 0.8 % (ref 0–1.8)
BASOPHILS # BLD: 0.05 K/UL (ref 0–0.12)
BASOPHILS # BLD: 0.09 K/UL (ref 0–0.12)
BILIRUB SERPL-MCNC: 0.9 MG/DL (ref 0.1–1.5)
BILIRUB SERPL-MCNC: 1.7 MG/DL (ref 0.1–1.5)
BLD GP AB SCN SERPL QL: NORMAL
BUN SERPL-MCNC: 18 MG/DL (ref 8–22)
BUN SERPL-MCNC: 18 MG/DL (ref 8–22)
BUN SERPL-MCNC: 21 MG/DL (ref 8–22)
CALCIUM SERPL-MCNC: 9.2 MG/DL (ref 8.5–10.5)
CALCIUM SERPL-MCNC: 9.3 MG/DL (ref 8.5–10.5)
CALCIUM SERPL-MCNC: 9.7 MG/DL (ref 8.5–10.5)
CHLORIDE SERPL-SCNC: 102 MMOL/L (ref 96–112)
CHLORIDE SERPL-SCNC: 104 MMOL/L (ref 96–112)
CHLORIDE SERPL-SCNC: 107 MMOL/L (ref 96–112)
CHOLEST SERPL-MCNC: 209 MG/DL (ref 100–199)
CO2 SERPL-SCNC: 20 MMOL/L (ref 20–33)
CO2 SERPL-SCNC: 22 MMOL/L (ref 20–33)
CO2 SERPL-SCNC: 23 MMOL/L (ref 20–33)
CREAT SERPL-MCNC: 1.13 MG/DL (ref 0.5–1.4)
CREAT SERPL-MCNC: 1.15 MG/DL (ref 0.5–1.4)
CREAT SERPL-MCNC: 1.15 MG/DL (ref 0.5–1.4)
EKG IMPRESSION: NORMAL
EOSINOPHIL # BLD AUTO: 0.32 K/UL (ref 0–0.51)
EOSINOPHIL # BLD AUTO: 0.45 K/UL (ref 0–0.51)
EOSINOPHIL NFR BLD: 3.8 % (ref 0–6.9)
EOSINOPHIL NFR BLD: 3.8 % (ref 0–6.9)
ERYTHROCYTE [DISTWIDTH] IN BLOOD BY AUTOMATED COUNT: 51.8 FL (ref 35.9–50)
ERYTHROCYTE [DISTWIDTH] IN BLOOD BY AUTOMATED COUNT: 52.2 FL (ref 35.9–50)
ERYTHROCYTE [DISTWIDTH] IN BLOOD BY AUTOMATED COUNT: 52.4 FL (ref 35.9–50)
EST. AVERAGE GLUCOSE BLD GHB EST-MCNC: 140 MG/DL
GLOBULIN SER CALC-MCNC: 3 G/DL (ref 1.9–3.5)
GLOBULIN SER CALC-MCNC: 3.1 G/DL (ref 1.9–3.5)
GLUCOSE SERPL-MCNC: 102 MG/DL (ref 65–99)
GLUCOSE SERPL-MCNC: 115 MG/DL (ref 65–99)
GLUCOSE SERPL-MCNC: 95 MG/DL (ref 65–99)
HBA1C MFR BLD: 6.5 % (ref 0–5.6)
HCT VFR BLD AUTO: 48.8 % (ref 42–52)
HCT VFR BLD AUTO: 50 % (ref 42–52)
HCT VFR BLD AUTO: 52.8 % (ref 42–52)
HDLC SERPL-MCNC: 35 MG/DL
HGB BLD-MCNC: 17 G/DL (ref 14–18)
HGB BLD-MCNC: 17.5 G/DL (ref 14–18)
HGB BLD-MCNC: 18.4 G/DL (ref 14–18)
IMM GRANULOCYTES # BLD AUTO: 0.01 K/UL (ref 0–0.11)
IMM GRANULOCYTES # BLD AUTO: 0.03 K/UL (ref 0–0.11)
IMM GRANULOCYTES NFR BLD AUTO: 0.1 % (ref 0–0.9)
IMM GRANULOCYTES NFR BLD AUTO: 0.3 % (ref 0–0.9)
INR PPP: 0.99 (ref 0.87–1.13)
INR PPP: 1.01 (ref 0.87–1.13)
LDLC SERPL CALC-MCNC: 123 MG/DL
LV EJECT FRACT  99904: 60
LV EJECT FRACT MOD 2C 99903: 50.34
LV EJECT FRACT MOD 4C 99902: 55.56
LV EJECT FRACT MOD BP 99901: 51.61
LYMPHOCYTES # BLD AUTO: 2.46 K/UL (ref 1–4.8)
LYMPHOCYTES # BLD AUTO: 2.6 K/UL (ref 1–4.8)
LYMPHOCYTES NFR BLD: 21 % (ref 22–41)
LYMPHOCYTES NFR BLD: 31 % (ref 22–41)
MCH RBC QN AUTO: 33.9 PG (ref 27–33)
MCH RBC QN AUTO: 34.2 PG (ref 27–33)
MCH RBC QN AUTO: 34.3 PG (ref 27–33)
MCHC RBC AUTO-ENTMCNC: 34.8 G/DL (ref 33.7–35.3)
MCHC RBC AUTO-ENTMCNC: 34.8 G/DL (ref 33.7–35.3)
MCHC RBC AUTO-ENTMCNC: 35 G/DL (ref 33.7–35.3)
MCV RBC AUTO: 97.4 FL (ref 81.4–97.8)
MCV RBC AUTO: 97.8 FL (ref 81.4–97.8)
MCV RBC AUTO: 98.6 FL (ref 81.4–97.8)
MONOCYTES # BLD AUTO: 1.32 K/UL (ref 0–0.85)
MONOCYTES # BLD AUTO: 1.35 K/UL (ref 0–0.85)
MONOCYTES NFR BLD AUTO: 11.5 % (ref 0–13.4)
MONOCYTES NFR BLD AUTO: 15.8 % (ref 0–13.4)
NEUTROPHILS # BLD AUTO: 4.08 K/UL (ref 1.82–7.42)
NEUTROPHILS # BLD AUTO: 7.33 K/UL (ref 1.82–7.42)
NEUTROPHILS NFR BLD: 48.7 % (ref 44–72)
NEUTROPHILS NFR BLD: 62.6 % (ref 44–72)
NRBC # BLD AUTO: 0 K/UL
NRBC # BLD AUTO: 0 K/UL
NRBC BLD-RTO: 0 /100 WBC
NRBC BLD-RTO: 0 /100 WBC
PLATELET # BLD AUTO: 358 K/UL (ref 164–446)
PLATELET # BLD AUTO: 378 K/UL (ref 164–446)
PLATELET # BLD AUTO: 383 K/UL (ref 164–446)
PMV BLD AUTO: 8.5 FL (ref 9–12.9)
PMV BLD AUTO: 8.9 FL (ref 9–12.9)
PMV BLD AUTO: 9 FL (ref 9–12.9)
POTASSIUM SERPL-SCNC: 4 MMOL/L (ref 3.6–5.5)
POTASSIUM SERPL-SCNC: 4.1 MMOL/L (ref 3.6–5.5)
POTASSIUM SERPL-SCNC: 4.2 MMOL/L (ref 3.6–5.5)
PROT SERPL-MCNC: 7.1 G/DL (ref 6–8.2)
PROT SERPL-MCNC: 7.6 G/DL (ref 6–8.2)
PROTHROMBIN TIME: 13.1 SEC (ref 12–14.6)
PROTHROMBIN TIME: 13.4 SEC (ref 12–14.6)
RBC # BLD AUTO: 4.95 M/UL (ref 4.7–6.1)
RBC # BLD AUTO: 5.11 M/UL (ref 4.7–6.1)
RBC # BLD AUTO: 5.42 M/UL (ref 4.7–6.1)
RH BLD: NORMAL
RH BLD: NORMAL
SODIUM SERPL-SCNC: 136 MMOL/L (ref 135–145)
SODIUM SERPL-SCNC: 137 MMOL/L (ref 135–145)
SODIUM SERPL-SCNC: 138 MMOL/L (ref 135–145)
TRIGL SERPL-MCNC: 257 MG/DL (ref 0–149)
TROPONIN I SERPL-MCNC: 0.33 NG/ML (ref 0–0.04)
TROPONIN I SERPL-MCNC: 0.37 NG/ML (ref 0–0.04)
TROPONIN I SERPL-MCNC: 0.4 NG/ML (ref 0–0.04)
TROPONIN I SERPL-MCNC: 0.59 NG/ML (ref 0–0.04)
TROPONIN I SERPL-MCNC: 0.66 NG/ML (ref 0–0.04)
WBC # BLD AUTO: 10.4 K/UL (ref 4.8–10.8)
WBC # BLD AUTO: 11.7 K/UL (ref 4.8–10.8)
WBC # BLD AUTO: 8.4 K/UL (ref 4.8–10.8)

## 2018-11-20 PROCEDURE — 99153 MOD SED SAME PHYS/QHP EA: CPT

## 2018-11-20 PROCEDURE — C1769 GUIDE WIRE: HCPCS

## 2018-11-20 PROCEDURE — 99232 SBSQ HOSP IP/OBS MODERATE 35: CPT | Performed by: INTERNAL MEDICINE

## 2018-11-20 PROCEDURE — 360979 HCHG DIAGNOSTIC CATH

## 2018-11-20 PROCEDURE — 86850 RBC ANTIBODY SCREEN: CPT

## 2018-11-20 PROCEDURE — 700117 HCHG RX CONTRAST REV CODE 255: Performed by: INTERNAL MEDICINE

## 2018-11-20 PROCEDURE — 85610 PROTHROMBIN TIME: CPT

## 2018-11-20 PROCEDURE — 700102 HCHG RX REV CODE 250 W/ 637 OVERRIDE(OP): Performed by: INTERNAL MEDICINE

## 2018-11-20 PROCEDURE — B2111ZZ FLUOROSCOPY OF MULTIPLE CORONARY ARTERIES USING LOW OSMOLAR CONTRAST: ICD-10-PCS | Performed by: INTERNAL MEDICINE

## 2018-11-20 PROCEDURE — C1894 INTRO/SHEATH, NON-LASER: HCPCS

## 2018-11-20 PROCEDURE — 700101 HCHG RX REV CODE 250

## 2018-11-20 PROCEDURE — 700111 HCHG RX REV CODE 636 W/ 250 OVERRIDE (IP)

## 2018-11-20 PROCEDURE — 93306 TTE W/DOPPLER COMPLETE: CPT

## 2018-11-20 PROCEDURE — 700102 HCHG RX REV CODE 250 W/ 637 OVERRIDE(OP): Performed by: NURSE PRACTITIONER

## 2018-11-20 PROCEDURE — 85025 COMPLETE CBC W/AUTO DIFF WBC: CPT

## 2018-11-20 PROCEDURE — 86901 BLOOD TYPING SEROLOGIC RH(D): CPT

## 2018-11-20 PROCEDURE — 93306 TTE W/DOPPLER COMPLETE: CPT | Mod: 26 | Performed by: INTERNAL MEDICINE

## 2018-11-20 PROCEDURE — 93970 EXTREMITY STUDY: CPT

## 2018-11-20 PROCEDURE — 93458 L HRT ARTERY/VENTRICLE ANGIO: CPT

## 2018-11-20 PROCEDURE — 307093 HCHG TR BAND RADIAL

## 2018-11-20 PROCEDURE — B2151ZZ FLUOROSCOPY OF LEFT HEART USING LOW OSMOLAR CONTRAST: ICD-10-PCS | Performed by: INTERNAL MEDICINE

## 2018-11-20 PROCEDURE — 80061 LIPID PANEL: CPT

## 2018-11-20 PROCEDURE — 93005 ELECTROCARDIOGRAM TRACING: CPT | Performed by: THORACIC SURGERY (CARDIOTHORACIC VASCULAR SURGERY)

## 2018-11-20 PROCEDURE — 85730 THROMBOPLASTIN TIME PARTIAL: CPT

## 2018-11-20 PROCEDURE — 99152 MOD SED SAME PHYS/QHP 5/>YRS: CPT | Performed by: INTERNAL MEDICINE

## 2018-11-20 PROCEDURE — 700101 HCHG RX REV CODE 250: Performed by: NURSE PRACTITIONER

## 2018-11-20 PROCEDURE — 93567 NJX CAR CTH SPRVLV AORTGRPHY: CPT

## 2018-11-20 PROCEDURE — 93010 ELECTROCARDIOGRAM REPORT: CPT | Performed by: INTERNAL MEDICINE

## 2018-11-20 PROCEDURE — 700117 HCHG RX CONTRAST REV CODE 255: Performed by: NURSE PRACTITIONER

## 2018-11-20 PROCEDURE — 85027 COMPLETE CBC AUTOMATED: CPT

## 2018-11-20 PROCEDURE — 700105 HCHG RX REV CODE 258: Performed by: HOSPITALIST

## 2018-11-20 PROCEDURE — 700102 HCHG RX REV CODE 250 W/ 637 OVERRIDE(OP): Performed by: HOSPITALIST

## 2018-11-20 PROCEDURE — 80053 COMPREHEN METABOLIC PANEL: CPT

## 2018-11-20 PROCEDURE — 4A023N7 MEASUREMENT OF CARDIAC SAMPLING AND PRESSURE, LEFT HEART, PERCUTANEOUS APPROACH: ICD-10-PCS | Performed by: INTERNAL MEDICINE

## 2018-11-20 PROCEDURE — A9270 NON-COVERED ITEM OR SERVICE: HCPCS | Performed by: NURSE PRACTITIONER

## 2018-11-20 PROCEDURE — 99152 MOD SED SAME PHYS/QHP 5/>YRS: CPT

## 2018-11-20 PROCEDURE — A9270 NON-COVERED ITEM OR SERVICE: HCPCS | Performed by: INTERNAL MEDICINE

## 2018-11-20 PROCEDURE — 770022 HCHG ROOM/CARE - ICU (200)

## 2018-11-20 PROCEDURE — 71045 X-RAY EXAM CHEST 1 VIEW: CPT

## 2018-11-20 PROCEDURE — 700105 HCHG RX REV CODE 258: Performed by: INTERNAL MEDICINE

## 2018-11-20 PROCEDURE — A9270 NON-COVERED ITEM OR SERVICE: HCPCS | Performed by: HOSPITALIST

## 2018-11-20 PROCEDURE — 99232 SBSQ HOSP IP/OBS MODERATE 35: CPT | Performed by: SPECIALIST

## 2018-11-20 PROCEDURE — 86900 BLOOD TYPING SEROLOGIC ABO: CPT

## 2018-11-20 PROCEDURE — 93880 EXTRACRANIAL BILAT STUDY: CPT

## 2018-11-20 PROCEDURE — 93567 NJX CAR CTH SPRVLV AORTGRPHY: CPT | Performed by: INTERNAL MEDICINE

## 2018-11-20 PROCEDURE — 80048 BASIC METABOLIC PNL TOTAL CA: CPT

## 2018-11-20 PROCEDURE — 93458 L HRT ARTERY/VENTRICLE ANGIO: CPT | Mod: 26 | Performed by: INTERNAL MEDICINE

## 2018-11-20 PROCEDURE — 84484 ASSAY OF TROPONIN QUANT: CPT | Mod: 91

## 2018-11-20 PROCEDURE — 700111 HCHG RX REV CODE 636 W/ 250 OVERRIDE (IP): Performed by: HOSPITALIST

## 2018-11-20 RX ORDER — DEXMEDETOMIDINE HYDROCHLORIDE 4 UG/ML
0-1.5 INJECTION, SOLUTION INTRAVENOUS CONTINUOUS
Status: DISCONTINUED | OUTPATIENT
Start: 2018-11-21 | End: 2018-11-21

## 2018-11-20 RX ORDER — SODIUM CHLORIDE 9 MG/ML
INJECTION, SOLUTION INTRAVENOUS CONTINUOUS
Status: DISCONTINUED | OUTPATIENT
Start: 2018-11-20 | End: 2018-11-21

## 2018-11-20 RX ORDER — MIDAZOLAM HYDROCHLORIDE 1 MG/ML
INJECTION INTRAMUSCULAR; INTRAVENOUS
Status: COMPLETED
Start: 2018-11-20 | End: 2018-11-20

## 2018-11-20 RX ORDER — HEPARIN SODIUM,PORCINE 1000/ML
VIAL (ML) INJECTION
Status: COMPLETED
Start: 2018-11-20 | End: 2018-11-20

## 2018-11-20 RX ORDER — LIDOCAINE HYDROCHLORIDE 20 MG/ML
INJECTION, SOLUTION INFILTRATION; PERINEURAL
Status: COMPLETED
Start: 2018-11-20 | End: 2018-11-20

## 2018-11-20 RX ORDER — ALPRAZOLAM 0.25 MG/1
0.25 TABLET ORAL EVERY 6 HOURS PRN
Status: DISCONTINUED | OUTPATIENT
Start: 2018-11-20 | End: 2018-11-26

## 2018-11-20 RX ORDER — ROSUVASTATIN CALCIUM 10 MG/1
20 TABLET, COATED ORAL EVERY EVENING
Status: DISCONTINUED | OUTPATIENT
Start: 2018-11-20 | End: 2018-11-29 | Stop reason: HOSPADM

## 2018-11-20 RX ORDER — VERAPAMIL HYDROCHLORIDE 2.5 MG/ML
INJECTION, SOLUTION INTRAVENOUS
Status: COMPLETED
Start: 2018-11-20 | End: 2018-11-20

## 2018-11-20 RX ORDER — SODIUM CHLORIDE 9 MG/ML
INJECTION, SOLUTION INTRAVENOUS CONTINUOUS
Status: ACTIVE | OUTPATIENT
Start: 2018-11-20 | End: 2018-11-20

## 2018-11-20 RX ADMIN — NITROGLYCERIN 10 ML: 20 INJECTION INTRAVENOUS at 14:01

## 2018-11-20 RX ADMIN — LISINOPRIL 20 MG: 20 TABLET ORAL at 05:57

## 2018-11-20 RX ADMIN — FENTANYL CITRATE 75 MCG: 50 INJECTION, SOLUTION INTRAMUSCULAR; INTRAVENOUS at 14:40

## 2018-11-20 RX ADMIN — ROSUVASTATIN CALCIUM 20 MG: 10 TABLET, FILM COATED ORAL at 17:42

## 2018-11-20 RX ADMIN — HEPARIN SODIUM 3800 UNITS: 1000 INJECTION, SOLUTION INTRAVENOUS; SUBCUTANEOUS at 05:58

## 2018-11-20 RX ADMIN — ASPIRIN 81 MG: 81 TABLET, COATED ORAL at 05:57

## 2018-11-20 RX ADMIN — SODIUM CHLORIDE: 9 INJECTION, SOLUTION INTRAVENOUS at 00:12

## 2018-11-20 RX ADMIN — METOPROLOL TARTRATE 12.5 MG: 25 TABLET, FILM COATED ORAL at 17:43

## 2018-11-20 RX ADMIN — HEPARIN SODIUM 1550 UNITS/HR: 5000 INJECTION, SOLUTION INTRAVENOUS at 11:45

## 2018-11-20 RX ADMIN — SODIUM CHLORIDE: 9 INJECTION, SOLUTION INTRAVENOUS at 11:39

## 2018-11-20 RX ADMIN — NITROGLYCERIN 1 INCH: 20 OINTMENT TOPICAL at 09:02

## 2018-11-20 RX ADMIN — MIDAZOLAM HYDROCHLORIDE 2 MG: 1 INJECTION, SOLUTION INTRAMUSCULAR; INTRAVENOUS at 14:40

## 2018-11-20 RX ADMIN — HEPARIN SODIUM 2000 UNITS: 200 INJECTION, SOLUTION INTRAVENOUS at 14:02

## 2018-11-20 RX ADMIN — METOPROLOL TARTRATE 12.5 MG: 25 TABLET, FILM COATED ORAL at 09:01

## 2018-11-20 RX ADMIN — SODIUM CHLORIDE: 9 INJECTION, SOLUTION INTRAVENOUS at 08:00

## 2018-11-20 RX ADMIN — NITROGLYCERIN 1 INCH: 20 OINTMENT TOPICAL at 03:18

## 2018-11-20 RX ADMIN — NITROGLYCERIN 1 INCH: 20 OINTMENT TOPICAL at 22:26

## 2018-11-20 RX ADMIN — IOHEXOL 119 ML: 350 INJECTION, SOLUTION INTRAVENOUS at 14:39

## 2018-11-20 RX ADMIN — MUPIROCIN 1 DOSE: 20 OINTMENT TOPICAL at 22:26

## 2018-11-20 RX ADMIN — SODIUM CHLORIDE: 9 INJECTION, SOLUTION INTRAVENOUS at 22:01

## 2018-11-20 RX ADMIN — HEPARIN SODIUM: 1000 INJECTION, SOLUTION INTRAVENOUS; SUBCUTANEOUS at 14:11

## 2018-11-20 RX ADMIN — HUMAN ALBUMIN MICROSPHERES AND PERFLUTREN 3 ML: 10; .22 INJECTION, SOLUTION INTRAVENOUS at 16:45

## 2018-11-20 RX ADMIN — SODIUM CHLORIDE: 9 INJECTION, SOLUTION INTRAVENOUS at 16:00

## 2018-11-20 RX ADMIN — VERAPAMIL HYDROCHLORIDE 2.5 MG: 2.5 INJECTION, SOLUTION INTRAVENOUS at 14:01

## 2018-11-20 RX ADMIN — LIDOCAINE HYDROCHLORIDE: 20 INJECTION, SOLUTION INFILTRATION; PERINEURAL at 14:01

## 2018-11-20 RX ADMIN — ALPRAZOLAM 0.25 MG: 0.25 TABLET ORAL at 22:25

## 2018-11-20 RX ADMIN — FENTANYL CITRATE 100 MCG: 50 INJECTION, SOLUTION INTRAMUSCULAR; INTRAVENOUS at 14:40

## 2018-11-20 ASSESSMENT — COGNITIVE AND FUNCTIONAL STATUS - GENERAL
SUGGESTED CMS G CODE MODIFIER DAILY ACTIVITY: CH
DAILY ACTIVITIY SCORE: 24
SUGGESTED CMS G CODE MODIFIER MOBILITY: CH
MOBILITY SCORE: 24

## 2018-11-20 ASSESSMENT — ENCOUNTER SYMPTOMS
HEADACHES: 0
FEVER: 0
DIZZINESS: 0
NAUSEA: 0
PALPITATIONS: 0
ABDOMINAL PAIN: 0
CHILLS: 0
SHORTNESS OF BREATH: 0
CHEST TIGHTNESS: 0
COUGH: 0
DIARRHEA: 0
COUGH: 1
VOMITING: 0

## 2018-11-20 ASSESSMENT — PATIENT HEALTH QUESTIONNAIRE - PHQ9
2. FEELING DOWN, DEPRESSED, IRRITABLE, OR HOPELESS: NOT AT ALL
SUM OF ALL RESPONSES TO PHQ9 QUESTIONS 1 AND 2: 0
1. LITTLE INTEREST OR PLEASURE IN DOING THINGS: NOT AT ALL
2. FEELING DOWN, DEPRESSED, IRRITABLE, OR HOPELESS: NOT AT ALL
SUM OF ALL RESPONSES TO PHQ9 QUESTIONS 1 AND 2: 0
1. LITTLE INTEREST OR PLEASURE IN DOING THINGS: NOT AT ALL

## 2018-11-20 ASSESSMENT — PAIN SCALES - GENERAL
PAINLEVEL_OUTOF10: 0

## 2018-11-20 NOTE — ED NOTES
Per pharmacist Nabor run heparin that was started PTA at same rate of 1250 units/hr until next lab draw at 0405.

## 2018-11-20 NOTE — H&P
Hospital Medicine History & Physical Note    Date of Service  11/19/2018    Primary Care Physician  Pcp Pt States None    Consultants  Cardiology Melony    Code Status  Full code    Chief Complaint  Chest pain    History of Presenting Illness  68 y.o. male with history of essential hypertension, was in his usual state of health until 5 days prior to admission.  He began to have symptoms of bronchitis.  This included cough, fever and chills, and some shortness of breath.  3 days prior to admission he began to have some chest pain which he initially attributed to the bronchitis, however he began to have symptoms at worst somewhat more concerning for cardiac origin including diaphoresis at the top of his head with the pain, as well as radiation of the pain to his middle jaw.  He denies any current shortness of breath, the chest pain is controlled, no abdominal pain, nausea vomiting, diarrhea or constipation.  He presented to an outside facility with these complaints, where he was found to have an elevated troponin, and transferred to this facility for higher level of care.    Review of Systems  Review of Systems   Constitutional: Positive for diaphoresis.   HENT: Negative.    Eyes: Negative.    Respiratory: Positive for cough.    Cardiovascular: Positive for chest pain.   Gastrointestinal: Negative.    Genitourinary: Negative.    Musculoskeletal: Negative.    Skin: Negative.    Neurological: Negative.    Endo/Heme/Allergies: Negative.    Psychiatric/Behavioral: Negative.        Past Medical History   has a past medical history of Diabetes (HCC); Hypertension; and Upper GI bleed (2007).    Surgical History   has a past surgical history that includes cervical laminectomy posterior; lumbar laminectomy diskectomy; carpal tunnel release (Bilateral); and nerve ulnar transfer (Bilateral).     Family History  family history includes Dementia in his father and mother; Heart Attack (age of onset: 86) in his father.     Social  History   reports that he has never smoked. He has never used smokeless tobacco. He reports that he drinks alcohol. He reports that he does not use drugs.    Allergies  No Known Allergies    Medications  Prior to Admission Medications   Prescriptions Last Dose Informant Patient Reported? Taking?   LOTEMAX 0.5 % ophthalmic suspension   Yes No   VERAPAMIL HCL ER PO   Yes No   Sig: Take  by mouth.   lisinopril (PRINIVIL) 20 MG Tab   Yes No   Sig: Take 20 mg by mouth every day.      Facility-Administered Medications: None       Physical Exam  Temp:  [37.3 °C (99.1 °F)] 37.3 °C (99.1 °F)  Pulse:  [80] 80  Resp:  [18] 18  BP: (146)/(98) 146/98    Physical Exam   Constitutional: He is oriented to person, place, and time. He appears well-developed and well-nourished. No distress.   HENT:   Head: Normocephalic and atraumatic.   Eyes: Pupils are equal, round, and reactive to light. Conjunctivae are normal.   Neck: Normal range of motion. Neck supple. No tracheal deviation present. No thyromegaly present.   Cardiovascular: Normal rate, regular rhythm and normal heart sounds.  Exam reveals no gallop and no friction rub.    No murmur heard.  Pulmonary/Chest: Effort normal and breath sounds normal. No respiratory distress. He has no wheezes.   Abdominal: Soft. Bowel sounds are normal. He exhibits no distension and no mass. There is no tenderness. There is no rebound and no guarding.   Prior midline scar   Musculoskeletal: Normal range of motion. He exhibits no edema.   Lymphadenopathy:     He has no cervical adenopathy.   Neurological: He is alert and oriented to person, place, and time. No cranial nerve deficit.   Skin: Skin is warm and dry. He is not diaphoretic.   Psychiatric: He has a normal mood and affect.   Nursing note and vitals reviewed.      Laboratory:    Labs reviewed from outside facility show troponin 0 0.269 elevated from initial troponin of 0.195    Sodium 138, potassium 4.9,, chloride 105, bicarbonate 19,  glucose 111, BUN 24, creatinine 1.14, AST 26, ALT 27, alkaline phosphatase 107, T bili 1.2    D-dimer 234    White blood cells 10.5, hemoglobin 18.6, hematocrit 53.6, , platelets 403    Urinalysis:    No results found     Imaging:  Chest radiograph with postsurgical changes within the left lung, no acute cardiopulmonary process      Assessment/Plan:  I anticipate this patient will require at least two midnights for appropriate medical management, necessitating inpatient admission.    * NSTEMI (non-ST elevated myocardial infarction) (HCC)   Assessment & Plan    Admit, start heparin infusion, appreciate cardiology consultation, plan for coronary angiography.  trend troponin.     Essential hypertension- (present on admission)   Assessment & Plan    Controlled with current medication regimen.  Monitor.      Class 1 obesity due to excess calories without serious comorbidity with body mass index (BMI) of 30.0 to 30.9 in adult   Assessment & Plan    Body mass index is 30.22 kg/m².           VTE prophylaxis: SCD, lovenox

## 2018-11-20 NOTE — PROGRESS NOTES
Hospital Medicine Daily Progress Note    Date of Service  11/20/2018    Chief Complaint  68 y.o. male admitted 11/19/2018 with chest pain    Hospital Course    68 y.o. male with history of essential hypertension, was in his usual state of health until 5 days prior to admission.  He began to have symptoms of bronchitis.  This included cough, fever and chills, and some shortness of breath.  3 days prior to admission he began to have some chest pain which he initially attributed to the bronchitis, however he began to have symptoms at worst somewhat more concerning for cardiac origin including diaphoresis at the top of his head with the pain, as well as radiation of the pain to his middle jaw.  He denies any current shortness of breath, the chest pain is controlled, no abdominal pain, nausea vomiting, diarrhea or constipation.  He presented to an outside facility with these complaints, where he was found to have an elevated troponin, and transferred to this facility for higher level of care.  Patient had coronary arteriography today and is seen in his room after the procedure; nurse reports that no stent intervention was undertaken and it is recommended that cardiothoracic surgical evaluation for possible coronary artery bypass grafting be performed.         Interval Problem Update  Had cardiac catheterization, I am told with multivessel disease that requires cardiothoracic surgical consultation    Consultants/Specialty  Cardiology    Code Status  Full    Disposition  Pending    Review of Systems  Review of Systems   Constitutional: Negative for chills and fever.   Respiratory: Negative for cough and shortness of breath.    Cardiovascular: Negative for chest pain.   Gastrointestinal: Negative for diarrhea, nausea and vomiting.   Genitourinary: Negative for dysuria.   Neurological: Negative for dizziness and headaches.        Physical Exam  Temp:  [36.2 °C (97.1 °F)-37.3 °C (99.1 °F)] 36.4 °C (97.5 °F)  Pulse:  [56-80]  61  Resp:  [16-18] 18  BP: (101-146)/(70-98) 134/95    Physical Exam   Constitutional: He is oriented to person, place, and time. He appears well-developed and well-nourished. No distress.   HENT:   Head: Normocephalic and atraumatic.   Mouth/Throat: No oropharyngeal exudate.   Eyes: Pupils are equal, round, and reactive to light. Conjunctivae and EOM are normal. No scleral icterus.   Neck: Normal range of motion. Neck supple.   Cardiovascular: Normal rate and regular rhythm.    Pulmonary/Chest: Effort normal and breath sounds normal.   Abdominal: Soft. Bowel sounds are normal. He exhibits no distension. There is no tenderness.   Musculoskeletal: Normal range of motion. He exhibits no edema or tenderness.   Lymphadenopathy:     He has no cervical adenopathy.   Neurological: He is alert and oriented to person, place, and time. No cranial nerve deficit.   Skin: Skin is warm and dry.   Psychiatric: He has a normal mood and affect.       Fluids    Intake/Output Summary (Last 24 hours) at 11/20/18 1526  Last data filed at 11/20/18 1037   Gross per 24 hour   Intake                0 ml   Output             1175 ml   Net            -1175 ml       Laboratory  Recent Labs      11/20/18   0201  11/20/18   0937   WBC  8.4  10.4   RBC  4.95  5.42   HEMOGLOBIN  17.0  18.4*   HEMATOCRIT  48.8  52.8*   MCV  98.6*  97.4   MCH  34.3*  33.9*   MCHC  34.8  34.8   RDW  52.4*  51.8*   PLATELETCT  358  383   MPV  9.0  8.5*     Recent Labs      11/20/18   0201  11/20/18   0937   SODIUM  136  137   POTASSIUM  4.0  4.1   CHLORIDE  104  102   CO2  20  23   GLUCOSE  95  115*   BUN  21  18   CREATININE  1.15  1.13   CALCIUM  9.2  9.7     Recent Labs      11/19/18   2000  11/20/18   0410  11/20/18   0937  11/20/18   1142   APTT  59.7*  44.4*  93.4*  56.4*   INR  1.03  1.01  0.99   --          Recent Labs      11/20/18   0201   TRIGLYCERIDE  257*   HDL  35*   LDL  123*       Imaging  EC-ECHOCARDIOGRAM COMPLETE W/O CONT    (Results Pending)    US-CAROTID DOPPLER BILAT    (Results Pending)   US-VEIN MAPPING LOWER EXTREMITY BILAT    (Results Pending)        Assessment/Plan  * NSTEMI (non-ST elevated myocardial infarction) (HCC)   Assessment & Plan    On heparin drip  Cardiac catheterization apparently shows multivessel disease not amenable to percutaneous coronary intervention  Consult cardiothoracic surgery     Essential hypertension- (present on admission)   Assessment & Plan    Controlled with current medication regimen.  Currently normotensive     Class 1 obesity due to excess calories without serious comorbidity with body mass index (BMI) of 30.0 to 30.9 in adult   Assessment & Plan    Body mass index is 30.22 kg/m².            VTE prophylaxis: Heparin
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
## 2018-11-20 NOTE — PROGRESS NOTES
Bedside report received. Patient A&O x4. RA. SR on the monitor.  No complaints of pain at this time. POC discussed with patient. Pt NPO since midnight for cardiac cath today.  Patient verbalized understanding. Call light and belongings within reach. Bed locked and in lowest position, alarm and fall precautions in place.

## 2018-11-20 NOTE — ED TRIAGE NOTES
BIB EMS as a transfer from [facility] for NSTEMI. Pt has had bronchitis for the last week then started developing some substernal CP twith some increased SOB three days ago. Pt came to the ED and was found to have an elevated troponin. Pt was started on heparin drip he received a 4000 unit bolus and has 1250 units/hr drip with 324 mg of ASA and on NTG tablet PTA. Pt placed on cardiac monitor, BP cuff, and pulse ox, ekg performed on arrival.

## 2018-11-20 NOTE — ED NOTES
Med rec completed per pt and historical.   Antibiotics within last 30 days: No  Patient allergies have been reviewed: LEATHA

## 2018-11-20 NOTE — PROGRESS NOTES
Cardiology Follow Up Progress Note    Date of Service  11/20/2018    Attending Physician  Fritz Gan M.D.    Chief Complaint   Chest pain    Consulted for NSTEMI.     ANTHONY Chaudhari is a 68 y.o. male transferred from [place] on 11/19/18 for NSTEMI. Patient presented to the ER in [place] with fever, cough, shortness of breath and chills X 3 days, troponin levels were found to be elevated. Patient also reported some recent history of waxing and waning chest pain over the last few days as well.    Past medical history significant for hypertension and diabetes.     Interim Events  11/20/18: Resting on edge of bed. Denies having any chest discomfort or palpitations. Patient states that he still feels like he has bronchitis. No significant events overnight.     Monitor: Sb/SR 51-75 with rare PVC.     Labs:  BMP Stable   CBC Stable    Review of Systems  Review of Systems   Constitutional: Negative for fever.   HENT: Positive for congestion.    Respiratory: Positive for cough. Negative for chest tightness and shortness of breath.    Cardiovascular: Negative for chest pain, palpitations and leg swelling.   Gastrointestinal: Negative for abdominal pain.   Genitourinary: Negative for hematuria.   Musculoskeletal: Negative for gait problem.   Neurological: Negative for dizziness and syncope.   All other systems reviewed and are negative.      Vital signs in last 24 hours  Temp:  [36.2 °C (97.1 °F)-37.3 °C (99.1 °F)] 36.4 °C (97.5 °F)  Pulse:  [56-80] 61  Resp:  [16-18] 18  BP: (101-146)/(70-98) 134/95    Physical Exam  Physical Exam   Constitutional: He is oriented to person, place, and time. He appears well-developed and well-nourished.   HENT:   Head: Normocephalic.   Eyes: EOM are normal.   Neck: No JVD present.   Cardiovascular: Normal rate, regular rhythm, normal heart sounds and intact distal pulses.    Pulmonary/Chest: Effort normal and breath sounds normal.   Abdominal: Soft. There is no tenderness.   Central  obesity with well healed central midline abdominal scar.    Musculoskeletal: Normal range of motion. He exhibits no edema.   Neurological: He is alert and oriented to person, place, and time.   Skin: Skin is warm and dry.   Psychiatric: He has a normal mood and affect. His behavior is normal. Thought content normal.   Nursing note and vitals reviewed.      Lab Review  Lab Results   Component Value Date/Time    WBC 8.4 11/20/2018 02:01 AM    RBC 4.95 11/20/2018 02:01 AM    HEMOGLOBIN 17.0 11/20/2018 02:01 AM    HEMATOCRIT 48.8 11/20/2018 02:01 AM    MCV 98.6 (H) 11/20/2018 02:01 AM    MCH 34.3 (H) 11/20/2018 02:01 AM    MCHC 34.8 11/20/2018 02:01 AM    MPV 9.0 11/20/2018 02:01 AM      Lab Results   Component Value Date/Time    SODIUM 136 11/20/2018 02:01 AM    POTASSIUM 4.0 11/20/2018 02:01 AM    CHLORIDE 104 11/20/2018 02:01 AM    CO2 20 11/20/2018 02:01 AM    GLUCOSE 95 11/20/2018 02:01 AM    BUN 21 11/20/2018 02:01 AM    CREATININE 1.15 11/20/2018 02:01 AM      No results found for: ASTSGOT, ALTSGPT  Lab Results   Component Value Date/Time    CHOLSTRLTOT 209 (H) 11/20/2018 02:01 AM     (H) 11/20/2018 02:01 AM    HDL 35 (A) 11/20/2018 02:01 AM    TRIGLYCERIDE 257 (H) 11/20/2018 02:01 AM    TROPONINI 0.59 (H) 11/20/2018 05:52 AM             Cardiac Imaging and Procedures Review  Cardiac Catheterization: Pending today.    Assessment/Plan  NSTEMI:  -Trop trending down with a peak of 0.66.  -Plan for [facility] today.   -Currently on hep gtt.  -Continue statin therapy, ASA 81 mg daily and lisinopril 20 mg daily.  -Lopressor decreased to 12.5 mg BID due to HR, started this am.     HLD:  -LDL this am was 123.  -Crestor 20 mg daily started this admission.     HTN:  -Stable, slightly labile.  -Continue ACE and BB as above.     Thank you for allowing us to participate in the care of this patient. Please let us know       DEIRDRE Her.   [facility]  [phone]

## 2018-11-20 NOTE — ASSESSMENT & PLAN NOTE
On heparin drip  Cardiac catheterization apparently shows multivessel disease not amenable to percutaneous coronary intervention  Consult cardiothoracic surgery

## 2018-11-20 NOTE — CARE PLAN
Problem: Communication  Goal: The ability to communicate needs accurately and effectively will improve  Outcome: PROGRESSING AS EXPECTED  Discussed POC and medications with patient. Pt educated on NPO status to cardiac cath today. Pt has been NPO since midnight. Pt verbalized understanding.      Problem: Venous Thromboembolism (VTW)/Deep Vein Thrombosis (DVT) Prevention:  Goal: Patient will participate in Venous Thrombosis (VTE)/Deep Vein Thrombosis (DVT)Prevention Measures  Outcome: PROGRESSING AS EXPECTED  Pt on heparin gtt for VTE. Pt ambulatory.

## 2018-11-20 NOTE — CARE PLAN
Problem: Communication  Goal: The ability to communicate needs accurately and effectively will improve    Intervention: Educate patient and significant other/support system about the plan of care, procedures, treatments, medications and allow for questions  White board updated with POC and care team information during bedside report.      Problem: Safety  Goal: Will remain free from falls  Bed in lowest position. Pt refusing use of nonskid socks. Personal possessions within reach. Mobility sign on door. Pt refusing bed alarm. Call light within reach. Pt educated regarding fall prevention and states understanding.

## 2018-11-20 NOTE — THERAPY
PT eval order received. Pending cath today. Will defer eval until after this has been completed and pt is medically appropriate.

## 2018-11-20 NOTE — PROGRESS NOTES
2 RN skin assessment completed with SANKET Nguyen. Pt has blanching redness to bilateral ears. Slow healing wound to right achilles. Photo uploaded. All other skin intact.
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
## 2018-11-20 NOTE — PROCEDURES
DATE OF SERVICE:  11/20/2018    REFERRING PHYSICIAN:  Jace Greenfield MD    PROCEDURES:  1.  Left heart catheterization.  2.  Coronary angiography.  3.  Left ventriculogram.  4.  Ascending aortogram.  5.  Monitor for conscious sedation.    PREPROCEDURE DIAGNOSIS:    1.  Non-ST elevation myocardial infarction.    POSTPROCEDURE DIAGNOSES:  1.  Multivessel coronary artery disease with high-grade proximal left anterior   descending artery stenosis, high-grade mid diagonal branch stenosis,   high-grade mid right coronary artery stenosis.  2.  Normal left ventricular systolic function with ejection fraction of 55%,   mid to distal apical and diaphragmatic hypokinesis.  3.  Elevated left ventricular end-diastolic pressure.  4.  Mildly dilated ascending aorta.    INDICATION:  The patient is a 68-year-old male with past medical history   significant for hypertension and hyperlipidemia.  He was transferred from   [place] for non-STEMI and scheduled for cardiac catheterization.    DESCRIPTION OF PROCEDURE:  After informed consent was signed by the patient,   the patient was brought to the cardiac catheterization laboratory.  He was   prepped and draped in the usual sterile manner.  The right wrist area was   anesthetized with 2% Xylocaine.  A 6-Northern Irish sheath was inserted into the right   radial artery using the modified Seldinger technique.  Intra-arterial   verapamil and nitroglycerin were given.  IV heparin was given.  A 4-Northern Irish   pigtail catheter was positioned into the left ventricle.  Left angiography was   performed.  This catheter was removed.  Attempts were made to engage left   coronary catheters; however, this was unsuccessful due to severe tortuosity of   the subclavian artery.  The right inguinal area was anesthetized with 2%   Xylocaine.  A 4-Northern Irish sheath was inserted into the right femoral artery using   the modified Seldinger technique under ultrasound guidance.  A JL4 catheter   was positioned into the left main  coronary artery.  Coronary angiography was   performed.  This was exchanged for a 3DRC catheter, which was positioned into   the right coronary artery.  Coronary angiography was performed.  The patient   tolerated the procedure well.  At the end of procedure, all catheters and   sheaths were removed.  Hemoband was closed in the right wrist.  He was started   on IV heparin and transferred to telemetry in stable condition.    HEMODYNAMIC DATA:  Hemodynamic data shows aortic pressures of 140/90 with mean   of 110 mmHg and /0 with LVEDP of 20 mmHg.    AORTIC VALVE:  There is no significant gradient noted.    LEFT VENTRICULOGRAM:  A 10 mL of contrast was delivered for 3 seconds.    Ejection fraction was estimated to be 55%.  There was mild mid to distal   anterior and diaphragmatic hypokinesis.    ASCENDING AORTOGRAM:  A 10 mL of contrast was delivered for 3 seconds.  There   was mildly dilated ascending aorta noted.    ANGIOGRAM:  LEFT MAIN CORONARY ARTERY:  Left main coronary artery is a moderate length   large caliber vessel free of disease.  LEFT ANTERIOR DESCENDING ARTERY:  Left anterior descending artery is a long   moderate-caliber vessel, which wraps around the apex.  Proximal portion of the   vessel, there is a concentric 90% stenosis.  There is a moderate caliber   bifurcating diagonal branch with mid concentric 99% stenosis.  RAMUS INTERMEDIUS:  There are too small-to-moderate ramus intermedius both   noted free of disease.  LEFT CIRCUMFLEX ARTERY:  Left circumflex artery is a nondominant large caliber   vessel with moderate caliber bifurcating first obtuse marginal branch and   large bifurcating distal obtuse marginal branch.  Left circumflex artery and   its branches are free of disease.  RIGHT CORONARY ARTERY:  Right coronary artery is a dominant moderate-caliber   vessel with mid concentric 90% stenosis.  Distally, there is a long moderate   caliber posterior descending artery and moderate length,  moderate caliber   bifurcating posterior lateral branches noted free of disease.    IMPRESSION:    1.  Multivessel coronary artery disease with high-grade proximal left anterior   descending artery stenosis, high-grade mid diagonal branch stenosis,   high-grade mid right coronary artery stenosis.  2.  Normal left ventricular systolic function with ejection fraction of 55%,   mid to distal apical and diaphragmatic hypokinesis.  3.  Elevated left ventricular end-diastolic pressure.  4.  Mildly dilated ascending aorta.    RECOMMENDATIONS:  Recommend coronary artery bypass graft surgery.    SEDATION: The patient's sedation was managed by myself with continuous   face to face time with the patient for 15 minutes from 13:56 to 14:11.       ____________________________________     MD EWA LALA / IRMA    DD:  11/20/2018 14:59:41  DT:  11/20/2018 15:25:44    D#:  [id number]  Job#:  [id number]

## 2018-11-20 NOTE — ED NOTES
Bedside report to SANKET Nguyen from tele 8. Pt transported to tele [id number] with cardiac monitor by RN.

## 2018-11-21 ENCOUNTER — APPOINTMENT (OUTPATIENT)
Dept: RADIOLOGY | Facility: MEDICAL CENTER | Age: 68
DRG: 233 | End: 2018-11-21
Attending: THORACIC SURGERY (CARDIOTHORACIC VASCULAR SURGERY)
Payer: COMMERCIAL

## 2018-11-21 ENCOUNTER — APPOINTMENT (OUTPATIENT)
Dept: CARDIOLOGY | Facility: MEDICAL CENTER | Age: 68
DRG: 233 | End: 2018-11-21
Attending: THORACIC SURGERY (CARDIOTHORACIC VASCULAR SURGERY)
Payer: COMMERCIAL

## 2018-11-21 PROBLEM — J95.821 ACUTE RESPIRATORY FAILURE FOLLOWING TRAUMA AND SURGERY (HCC): Status: ACTIVE | Noted: 2018-11-21

## 2018-11-21 LAB
ACT BLD: 103 SEC (ref 74–137)
ACT BLD: 109 SEC (ref 74–137)
ACT BLD: 428 SEC (ref 74–137)
ACT BLD: 455 SEC (ref 74–137)
ACT BLD: 461 SEC (ref 74–137)
ACT BLD: 472 SEC (ref 74–137)
ACT BLD: 532 SEC (ref 74–137)
ACT BLD: 538 SEC (ref 74–137)
ACT BLD: 555 SEC (ref 74–137)
ACT BLD: 81 SEC (ref 74–137)
ACTION RANGE TRIGGERED IACRT: NO
APPEARANCE UR: CLEAR
APTT PPP: 24.5 SEC (ref 24.7–36)
APTT PPP: 39.1 SEC (ref 24.7–36)
BASE EXCESS BLDA CALC-SCNC: -1 MMOL/L (ref -4–3)
BASE EXCESS BLDA CALC-SCNC: -3 MMOL/L (ref -4–3)
BASE EXCESS BLDA CALC-SCNC: -4 MMOL/L (ref -4–3)
BASE EXCESS BLDA CALC-SCNC: -4 MMOL/L (ref -4–3)
BASE EXCESS BLDA CALC-SCNC: -5 MMOL/L (ref -4–3)
BASE EXCESS BLDA CALC-SCNC: 1 MMOL/L (ref -4–3)
BASE EXCESS BLDV CALC-SCNC: -1 MMOL/L (ref -4–3)
BILIRUB UR QL STRIP.AUTO: NEGATIVE
BODY TEMPERATURE: ABNORMAL DEGREES
BODY TEMPERATURE: NORMAL DEGREES
BODY TEMPERATURE: NORMAL DEGREES
CA-I BLD ISE-SCNC: 0.99 MMOL/L (ref 1.1–1.3)
CA-I BLD ISE-SCNC: 1.01 MMOL/L (ref 1.1–1.3)
CA-I BLD ISE-SCNC: 1.02 MMOL/L (ref 1.1–1.3)
CA-I BLD ISE-SCNC: 1.02 MMOL/L (ref 1.1–1.3)
CA-I BLD ISE-SCNC: 1.1 MMOL/L (ref 1.1–1.3)
CA-I BLD ISE-SCNC: 1.21 MMOL/L (ref 1.1–1.3)
CA-I BLD ISE-SCNC: 1.37 MMOL/L (ref 1.1–1.3)
CO2 BLDA-SCNC: 20 MMOL/L (ref 20–33)
CO2 BLDA-SCNC: 21 MMOL/L (ref 20–33)
CO2 BLDA-SCNC: 22 MMOL/L (ref 20–33)
CO2 BLDA-SCNC: 24 MMOL/L (ref 20–33)
CO2 BLDA-SCNC: 24 MMOL/L (ref 20–33)
CO2 BLDA-SCNC: 25 MMOL/L (ref 20–33)
CO2 BLDA-SCNC: 27 MMOL/L (ref 20–33)
CO2 BLDV-SCNC: 26 MMOL/L (ref 20–33)
COLOR UR: YELLOW
EKG IMPRESSION: NORMAL
GLUCOSE BLD-MCNC: 110 MG/DL (ref 65–99)
GLUCOSE BLD-MCNC: 117 MG/DL (ref 65–99)
GLUCOSE BLD-MCNC: 126 MG/DL (ref 65–99)
GLUCOSE BLD-MCNC: 131 MG/DL (ref 65–99)
GLUCOSE BLD-MCNC: 135 MG/DL (ref 65–99)
GLUCOSE BLD-MCNC: 143 MG/DL (ref 65–99)
GLUCOSE BLD-MCNC: 143 MG/DL (ref 65–99)
GLUCOSE BLD-MCNC: 145 MG/DL (ref 65–99)
GLUCOSE BLD-MCNC: 149 MG/DL (ref 65–99)
GLUCOSE BLD-MCNC: 164 MG/DL (ref 65–99)
GLUCOSE UR STRIP.AUTO-MCNC: NEGATIVE MG/DL
HCO3 BLDA-SCNC: 19.2 MMOL/L (ref 17–25)
HCO3 BLDA-SCNC: 20.2 MMOL/L (ref 17–25)
HCO3 BLDA-SCNC: 20.6 MMOL/L (ref 17–25)
HCO3 BLDA-SCNC: 21 MMOL/L (ref 17–25)
HCO3 BLDA-SCNC: 21 MMOL/L (ref 17–25)
HCO3 BLDA-SCNC: 21.1 MMOL/L (ref 17–25)
HCO3 BLDA-SCNC: 21.3 MMOL/L (ref 17–25)
HCO3 BLDA-SCNC: 22.4 MMOL/L (ref 17–25)
HCO3 BLDA-SCNC: 22.4 MMOL/L (ref 17–25)
HCO3 BLDA-SCNC: 23.9 MMOL/L (ref 17–25)
HCO3 BLDA-SCNC: 25.8 MMOL/L (ref 17–25)
HCO3 BLDV-SCNC: 24.4 MMOL/L (ref 24–28)
HCT VFR BLD CALC: 33 % (ref 42–52)
HCT VFR BLD CALC: 34 % (ref 42–52)
HCT VFR BLD CALC: 35 % (ref 42–52)
HCT VFR BLD CALC: 35 % (ref 42–52)
HCT VFR BLD CALC: 36 % (ref 42–52)
HCT VFR BLD CALC: 40 % (ref 42–52)
HCT VFR BLD CALC: 42 % (ref 42–52)
HCT VFR BLD CALC: 44 % (ref 42–52)
HCT VFR BLD CALC: 48 % (ref 42–52)
HGB BLD-MCNC: 11.2 G/DL (ref 14–18)
HGB BLD-MCNC: 11.6 G/DL (ref 14–18)
HGB BLD-MCNC: 11.9 G/DL (ref 14–18)
HGB BLD-MCNC: 11.9 G/DL (ref 14–18)
HGB BLD-MCNC: 12.2 G/DL (ref 14–18)
HGB BLD-MCNC: 13.6 G/DL (ref 14–18)
HGB BLD-MCNC: 14.3 G/DL (ref 14–18)
HGB BLD-MCNC: 15 G/DL (ref 14–18)
HGB BLD-MCNC: 16.3 G/DL (ref 14–18)
HOROWITZ INDEX BLDA+IHG-RTO: 103 MM[HG]
HOROWITZ INDEX BLDA+IHG-RTO: 110 MM[HG]
HOROWITZ INDEX BLDA+IHG-RTO: 112 MM[HG]
HOROWITZ INDEX BLDA+IHG-RTO: 112 MM[HG]
HOROWITZ INDEX BLDA+IHG-RTO: 116 MM[HG]
HOROWITZ INDEX BLDA+IHG-RTO: 128 MM[HG]
HOROWITZ INDEX BLDA+IHG-RTO: 134 MM[HG]
HOROWITZ INDEX BLDA+IHG-RTO: 150 MM[HG]
HOROWITZ INDEX BLDA+IHG-RTO: 268 MM[HG]
HOROWITZ INDEX BLDA+IHG-RTO: 276 MM[HG]
HOROWITZ INDEX BLDA+IHG-RTO: 331 MM[HG]
HOROWITZ INDEX BLDV+IHG-RTO: 53 MM[HG]
INR PPP: 1.23 (ref 0.87–1.13)
INST. QUALIFIED PATIENT IIQPT: YES
KETONES UR STRIP.AUTO-MCNC: NEGATIVE MG/DL
LEUKOCYTE ESTERASE UR QL STRIP.AUTO: NEGATIVE
MAGNESIUM SERPL-MCNC: 2.2 MG/DL (ref 1.5–2.5)
MICRO URNS: NORMAL
NITRITE UR QL STRIP.AUTO: NEGATIVE
O2/TOTAL GAS SETTING VFR VENT: 100 %
O2/TOTAL GAS SETTING VFR VENT: 40 %
O2/TOTAL GAS SETTING VFR VENT: 50 %
O2/TOTAL GAS SETTING VFR VENT: 50 %
O2/TOTAL GAS SETTING VFR VENT: 60 %
O2/TOTAL GAS SETTING VFR VENT: 80 %
O2/TOTAL GAS SETTING VFR VENT: 80 %
PATHOLOGY CONSULT NOTE: NORMAL
PCO2 BLDA: 31.5 MMHG (ref 26–37)
PCO2 BLDA: 31.9 MMHG (ref 26–37)
PCO2 BLDA: 32.7 MMHG (ref 26–37)
PCO2 BLDA: 35.3 MMHG (ref 26–37)
PCO2 BLDA: 36.2 MMHG (ref 26–37)
PCO2 BLDA: 36.3 MMHG (ref 26–37)
PCO2 BLDA: 37 MMHG (ref 26–37)
PCO2 BLDA: 39.1 MMHG (ref 26–37)
PCO2 BLDA: 39.3 MMHG (ref 26–37)
PCO2 BLDA: 40.1 MMHG (ref 26–37)
PCO2 BLDA: 40.1 MMHG (ref 26–37)
PCO2 BLDV: 41.8 MMHG (ref 41–51)
PCO2 TEMP ADJ BLDA: 31.5 MMHG (ref 26–37)
PCO2 TEMP ADJ BLDA: 31.5 MMHG (ref 26–37)
PCO2 TEMP ADJ BLDA: 31.6 MMHG (ref 26–37)
PCO2 TEMP ADJ BLDA: 35.3 MMHG (ref 26–37)
PCO2 TEMP ADJ BLDA: 36.1 MMHG (ref 26–37)
PCO2 TEMP ADJ BLDA: 36.3 MMHG (ref 26–37)
PCO2 TEMP ADJ BLDA: 36.5 MMHG (ref 26–37)
PCO2 TEMP ADJ BLDA: 39.1 MMHG (ref 26–37)
PCO2 TEMP ADJ BLDA: 39.3 MMHG (ref 26–37)
PCO2 TEMP ADJ BLDA: 40.1 MMHG (ref 26–37)
PCO2 TEMP ADJ BLDA: 40.1 MMHG (ref 26–37)
PCO2 TEMP ADJ BLDV: 41.8 MMHG (ref 41–51)
PH BLDA: 7.36 [PH] (ref 7.4–7.5)
PH BLDA: 7.37 [PH] (ref 7.4–7.5)
PH BLDA: 7.38 [PH] (ref 7.4–7.5)
PH BLDA: 7.39 [PH] (ref 7.4–7.5)
PH BLDA: 7.41 [PH] (ref 7.4–7.5)
PH BLDA: 7.42 [PH] (ref 7.4–7.5)
PH BLDA: 7.42 [PH] (ref 7.4–7.5)
PH BLDV: 7.37 [PH] (ref 7.31–7.45)
PH TEMP ADJ BLDA: 7.36 [PH] (ref 7.4–7.5)
PH TEMP ADJ BLDA: 7.36 [PH] (ref 7.4–7.5)
PH TEMP ADJ BLDA: 7.37 [PH] (ref 7.4–7.5)
PH TEMP ADJ BLDA: 7.37 [PH] (ref 7.4–7.5)
PH TEMP ADJ BLDA: 7.38 [PH] (ref 7.4–7.5)
PH TEMP ADJ BLDA: 7.38 [PH] (ref 7.4–7.5)
PH TEMP ADJ BLDA: 7.39 [PH] (ref 7.4–7.5)
PH TEMP ADJ BLDA: 7.39 [PH] (ref 7.4–7.5)
PH TEMP ADJ BLDA: 7.41 [PH] (ref 7.4–7.5)
PH TEMP ADJ BLDA: 7.42 [PH] (ref 7.4–7.5)
PH TEMP ADJ BLDA: 7.42 [PH] (ref 7.4–7.5)
PH TEMP ADJ BLDV: 7.37 [PH] (ref 7.31–7.45)
PH UR STRIP.AUTO: 5.5 [PH]
PLATELET # BLD AUTO: 264 K/UL (ref 164–446)
PO2 BLDA: 103 MMHG (ref 64–87)
PO2 BLDA: 116 MMHG (ref 64–87)
PO2 BLDA: 214 MMHG (ref 64–87)
PO2 BLDA: 221 MMHG (ref 64–87)
PO2 BLDA: 331 MMHG (ref 64–87)
PO2 BLDA: 60 MMHG (ref 64–87)
PO2 BLDA: 64 MMHG (ref 64–87)
PO2 BLDA: 66 MMHG (ref 64–87)
PO2 BLDA: 67 MMHG (ref 64–87)
PO2 BLDV: 53 MMHG (ref 25–40)
PO2 TEMP ADJ BLDA: 103 MMHG (ref 64–87)
PO2 TEMP ADJ BLDA: 116 MMHG (ref 64–87)
PO2 TEMP ADJ BLDA: 214 MMHG (ref 64–87)
PO2 TEMP ADJ BLDA: 221 MMHG (ref 64–87)
PO2 TEMP ADJ BLDA: 331 MMHG (ref 64–87)
PO2 TEMP ADJ BLDA: 61 MMHG (ref 64–87)
PO2 TEMP ADJ BLDA: 63 MMHG (ref 64–87)
PO2 TEMP ADJ BLDA: 64 MMHG (ref 64–87)
PO2 TEMP ADJ BLDA: 64 MMHG (ref 64–87)
PO2 TEMP ADJ BLDA: 66 MMHG (ref 64–87)
PO2 TEMP ADJ BLDA: 67 MMHG (ref 64–87)
PO2 TEMP ADJ BLDV: 53 MMHG (ref 25–40)
POTASSIUM BLD-SCNC: 3.6 MMOL/L (ref 3.6–5.5)
POTASSIUM BLD-SCNC: 3.6 MMOL/L (ref 3.6–5.5)
POTASSIUM BLD-SCNC: 3.8 MMOL/L (ref 3.6–5.5)
POTASSIUM BLD-SCNC: 4.4 MMOL/L (ref 3.6–5.5)
POTASSIUM BLD-SCNC: 4.5 MMOL/L (ref 3.6–5.5)
POTASSIUM BLD-SCNC: 4.6 MMOL/L (ref 3.6–5.5)
POTASSIUM BLD-SCNC: 4.9 MMOL/L (ref 3.6–5.5)
POTASSIUM SERPL-SCNC: 4.1 MMOL/L (ref 3.6–5.5)
POTASSIUM SERPL-SCNC: 4.3 MMOL/L (ref 3.6–5.5)
PROT UR QL STRIP: NEGATIVE MG/DL
PROTHROMBIN TIME: 15.6 SEC (ref 12–14.6)
RBC UR QL AUTO: NEGATIVE
SAO2 % BLDA: 100 % (ref 93–99)
SAO2 % BLDA: 90 % (ref 93–99)
SAO2 % BLDA: 92 % (ref 93–99)
SAO2 % BLDA: 93 % (ref 93–99)
SAO2 % BLDA: 98 % (ref 93–99)
SAO2 % BLDA: 98 % (ref 93–99)
SAO2 % BLDV: 86 %
SODIUM BLD-SCNC: 133 MMOL/L (ref 135–145)
SODIUM BLD-SCNC: 136 MMOL/L (ref 135–145)
SODIUM BLD-SCNC: 139 MMOL/L (ref 135–145)
SODIUM BLD-SCNC: 140 MMOL/L (ref 135–145)
SODIUM BLD-SCNC: 140 MMOL/L (ref 135–145)
SP GR UR STRIP.AUTO: 1.02
SPECIMEN DRAWN FROM PATIENT: ABNORMAL
SPECIMEN DRAWN FROM PATIENT: NORMAL
SPECIMEN DRAWN FROM PATIENT: NORMAL
TROPONIN I SERPL-MCNC: 0.5 NG/ML (ref 0–0.04)
TROPONIN I SERPL-MCNC: 1.59 NG/ML (ref 0–0.04)
TROPONIN I SERPL-MCNC: 2.6 NG/ML (ref 0–0.04)
UROBILINOGEN UR STRIP.AUTO-MCNC: 0.2 MG/DL

## 2018-11-21 PROCEDURE — 160031 HCHG SURGERY MINUTES - 1ST 30 MINS LEVEL 5: Performed by: THORACIC SURGERY (CARDIOTHORACIC VASCULAR SURGERY)

## 2018-11-21 PROCEDURE — 84132 ASSAY OF SERUM POTASSIUM: CPT | Mod: 91

## 2018-11-21 PROCEDURE — 700105 HCHG RX REV CODE 258

## 2018-11-21 PROCEDURE — C1729 CATH, DRAINAGE: HCPCS | Performed by: THORACIC SURGERY (CARDIOTHORACIC VASCULAR SURGERY)

## 2018-11-21 PROCEDURE — 700105 HCHG RX REV CODE 258: Performed by: NURSE PRACTITIONER

## 2018-11-21 PROCEDURE — 500767 HCHG KIT, ENDOSCOPIC VEIN HARVEST: Performed by: THORACIC SURGERY (CARDIOTHORACIC VASCULAR SURGERY)

## 2018-11-21 PROCEDURE — 500896 HCHG PACK, VASCULAR (FOR ROOM 10): Performed by: THORACIC SURGERY (CARDIOTHORACIC VASCULAR SURGERY)

## 2018-11-21 PROCEDURE — 500312 HCHG CONNECTOR, BLAKE DRAIN 1-WAY: Performed by: THORACIC SURGERY (CARDIOTHORACIC VASCULAR SURGERY)

## 2018-11-21 PROCEDURE — 06BP4ZZ EXCISION OF RIGHT SAPHENOUS VEIN, PERCUTANEOUS ENDOSCOPIC APPROACH: ICD-10-PCS | Performed by: THORACIC SURGERY (CARDIOTHORACIC VASCULAR SURGERY)

## 2018-11-21 PROCEDURE — 85730 THROMBOPLASTIN TIME PARTIAL: CPT | Mod: 91

## 2018-11-21 PROCEDURE — 700101 HCHG RX REV CODE 250: Performed by: NURSE PRACTITIONER

## 2018-11-21 PROCEDURE — 500890 HCHG PACK, OPEN HEART: Performed by: THORACIC SURGERY (CARDIOTHORACIC VASCULAR SURGERY)

## 2018-11-21 PROCEDURE — 160042 HCHG SURGERY MINUTES - EA ADDL 1 MIN LEVEL 5: Performed by: THORACIC SURGERY (CARDIOTHORACIC VASCULAR SURGERY)

## 2018-11-21 PROCEDURE — 71045 X-RAY EXAM CHEST 1 VIEW: CPT

## 2018-11-21 PROCEDURE — 82803 BLOOD GASES ANY COMBINATION: CPT | Mod: 91

## 2018-11-21 PROCEDURE — 503000 HCHG SUTURE, OHS: Performed by: THORACIC SURGERY (CARDIOTHORACIC VASCULAR SURGERY)

## 2018-11-21 PROCEDURE — 82330 ASSAY OF CALCIUM: CPT | Mod: 91

## 2018-11-21 PROCEDURE — 02100Z9 BYPASS CORONARY ARTERY, ONE ARTERY FROM LEFT INTERNAL MAMMARY, OPEN APPROACH: ICD-10-PCS | Performed by: THORACIC SURGERY (CARDIOTHORACIC VASCULAR SURGERY)

## 2018-11-21 PROCEDURE — 501745 HCHG WIRE, SURGICAL STEEL: Performed by: THORACIC SURGERY (CARDIOTHORACIC VASCULAR SURGERY)

## 2018-11-21 PROCEDURE — 84484 ASSAY OF TROPONIN QUANT: CPT

## 2018-11-21 PROCEDURE — A9270 NON-COVERED ITEM OR SERVICE: HCPCS | Performed by: NURSE PRACTITIONER

## 2018-11-21 PROCEDURE — 770022 HCHG ROOM/CARE - ICU (200)

## 2018-11-21 PROCEDURE — 81003 URINALYSIS AUTO W/O SCOPE: CPT

## 2018-11-21 PROCEDURE — 5A1221Z PERFORMANCE OF CARDIAC OUTPUT, CONTINUOUS: ICD-10-PCS | Performed by: THORACIC SURGERY (CARDIOTHORACIC VASCULAR SURGERY)

## 2018-11-21 PROCEDURE — 82962 GLUCOSE BLOOD TEST: CPT | Mod: 91

## 2018-11-21 PROCEDURE — P9047 ALBUMIN (HUMAN), 25%, 50ML: HCPCS | Mod: JG

## 2018-11-21 PROCEDURE — A9270 NON-COVERED ITEM OR SERVICE: HCPCS | Performed by: INTERNAL MEDICINE

## 2018-11-21 PROCEDURE — B548ZZA ULTRASONOGRAPHY OF SUPERIOR VENA CAVA, GUIDANCE: ICD-10-PCS | Performed by: ANESTHESIOLOGY

## 2018-11-21 PROCEDURE — 502240 HCHG MISC OR SUPPLY RC 0272: Performed by: THORACIC SURGERY (CARDIOTHORACIC VASCULAR SURGERY)

## 2018-11-21 PROCEDURE — 02HV33Z INSERTION OF INFUSION DEVICE INTO SUPERIOR VENA CAVA, PERCUTANEOUS APPROACH: ICD-10-PCS | Performed by: ANESTHESIOLOGY

## 2018-11-21 PROCEDURE — 500444 HCHG HEMOSTAT, SURGICEL 2X3: Performed by: THORACIC SURGERY (CARDIOTHORACIC VASCULAR SURGERY)

## 2018-11-21 PROCEDURE — 85014 HEMATOCRIT: CPT

## 2018-11-21 PROCEDURE — C1725 CATH, TRANSLUMIN NON-LASER: HCPCS | Performed by: THORACIC SURGERY (CARDIOTHORACIC VASCULAR SURGERY)

## 2018-11-21 PROCEDURE — 94002 VENT MGMT INPAT INIT DAY: CPT

## 2018-11-21 PROCEDURE — 85610 PROTHROMBIN TIME: CPT

## 2018-11-21 PROCEDURE — 82947 ASSAY GLUCOSE BLOOD QUANT: CPT | Mod: 91

## 2018-11-21 PROCEDURE — 84295 ASSAY OF SERUM SODIUM: CPT | Mod: 91

## 2018-11-21 PROCEDURE — C9248 INJ, CLEVIDIPINE BUTYRATE: HCPCS

## 2018-11-21 PROCEDURE — 700111 HCHG RX REV CODE 636 W/ 250 OVERRIDE (IP): Performed by: NURSE PRACTITIONER

## 2018-11-21 PROCEDURE — A6402 STERILE GAUZE <= 16 SQ IN: HCPCS | Performed by: THORACIC SURGERY (CARDIOTHORACIC VASCULAR SURGERY)

## 2018-11-21 PROCEDURE — 021109W BYPASS CORONARY ARTERY, TWO ARTERIES FROM AORTA WITH AUTOLOGOUS VENOUS TISSUE, OPEN APPROACH: ICD-10-PCS | Performed by: THORACIC SURGERY (CARDIOTHORACIC VASCULAR SURGERY)

## 2018-11-21 PROCEDURE — 94150 VITAL CAPACITY TEST: CPT

## 2018-11-21 PROCEDURE — 700101 HCHG RX REV CODE 250

## 2018-11-21 PROCEDURE — 03HY32Z INSERTION OF MONITORING DEVICE INTO UPPER ARTERY, PERCUTANEOUS APPROACH: ICD-10-PCS | Performed by: ANESTHESIOLOGY

## 2018-11-21 PROCEDURE — 160009 HCHG ANES TIME/MIN: Performed by: THORACIC SURGERY (CARDIOTHORACIC VASCULAR SURGERY)

## 2018-11-21 PROCEDURE — 85347 COAGULATION TIME ACTIVATED: CPT

## 2018-11-21 PROCEDURE — C1751 CATH, INF, PER/CENT/MIDLINE: HCPCS | Performed by: THORACIC SURGERY (CARDIOTHORACIC VASCULAR SURGERY)

## 2018-11-21 PROCEDURE — 700102 HCHG RX REV CODE 250 W/ 637 OVERRIDE(OP): Performed by: INTERNAL MEDICINE

## 2018-11-21 PROCEDURE — 160048 HCHG OR STATISTICAL LEVEL 1-5: Performed by: THORACIC SURGERY (CARDIOTHORACIC VASCULAR SURGERY)

## 2018-11-21 PROCEDURE — 5A1935Z RESPIRATORY VENTILATION, LESS THAN 24 CONSECUTIVE HOURS: ICD-10-PCS | Performed by: INTERNAL MEDICINE

## 2018-11-21 PROCEDURE — 500734 HCHG INSERT, STEALTH: Performed by: THORACIC SURGERY (CARDIOTHORACIC VASCULAR SURGERY)

## 2018-11-21 PROCEDURE — 503001 HCHG PERFUSION: Performed by: THORACIC SURGERY (CARDIOTHORACIC VASCULAR SURGERY)

## 2018-11-21 PROCEDURE — 93005 ELECTROCARDIOGRAM TRACING: CPT | Performed by: NURSE PRACTITIONER

## 2018-11-21 PROCEDURE — 93010 ELECTROCARDIOGRAM REPORT: CPT | Performed by: INTERNAL MEDICINE

## 2018-11-21 PROCEDURE — 93325 DOPPLER ECHO COLOR FLOW MAPG: CPT

## 2018-11-21 PROCEDURE — 700111 HCHG RX REV CODE 636 W/ 250 OVERRIDE (IP)

## 2018-11-21 PROCEDURE — 85049 AUTOMATED PLATELET COUNT: CPT

## 2018-11-21 PROCEDURE — 700102 HCHG RX REV CODE 250 W/ 637 OVERRIDE(OP): Performed by: NURSE PRACTITIONER

## 2018-11-21 PROCEDURE — 36556 INSERT NON-TUNNEL CV CATH: CPT

## 2018-11-21 PROCEDURE — 0JB60ZZ EXCISION OF CHEST SUBCUTANEOUS TISSUE AND FASCIA, OPEN APPROACH: ICD-10-PCS | Performed by: THORACIC SURGERY (CARDIOTHORACIC VASCULAR SURGERY)

## 2018-11-21 PROCEDURE — B24BZZ4 ULTRASONOGRAPHY OF HEART WITH AORTA, TRANSESOPHAGEAL: ICD-10-PCS | Performed by: THORACIC SURGERY (CARDIOTHORACIC VASCULAR SURGERY)

## 2018-11-21 PROCEDURE — 500371 HCHG DRAIN, BLAKE 10MM: Performed by: THORACIC SURGERY (CARDIOTHORACIC VASCULAR SURGERY)

## 2018-11-21 PROCEDURE — 88304 TISSUE EXAM BY PATHOLOGIST: CPT

## 2018-11-21 PROCEDURE — 500385 HCHG DRAIN, PLEUROVAC ADUL: Performed by: THORACIC SURGERY (CARDIOTHORACIC VASCULAR SURGERY)

## 2018-11-21 PROCEDURE — 83735 ASSAY OF MAGNESIUM: CPT

## 2018-11-21 PROCEDURE — 110371 HCHG SHELL REV 272: Performed by: THORACIC SURGERY (CARDIOTHORACIC VASCULAR SURGERY)

## 2018-11-21 PROCEDURE — C1894 INTRO/SHEATH, NON-LASER: HCPCS | Performed by: THORACIC SURGERY (CARDIOTHORACIC VASCULAR SURGERY)

## 2018-11-21 PROCEDURE — 37799 UNLISTED PX VASCULAR SURGERY: CPT

## 2018-11-21 PROCEDURE — 110454 HCHG SHELL REV 250: Performed by: THORACIC SURGERY (CARDIOTHORACIC VASCULAR SURGERY)

## 2018-11-21 PROCEDURE — 501519 HCHG SUTURE, E PACK: Performed by: THORACIC SURGERY (CARDIOTHORACIC VASCULAR SURGERY)

## 2018-11-21 DEVICE — GLUE BIOGLUE 5CC PRE-FILL (5EA/PK - 4 TIPS PER SYRINGE): Type: IMPLANTABLE DEVICE | Status: FUNCTIONAL

## 2018-11-21 RX ORDER — ACETAMINOPHEN 650 MG/1
650 SUPPOSITORY RECTAL EVERY 4 HOURS PRN
Status: DISCONTINUED | OUTPATIENT
Start: 2018-11-21 | End: 2018-11-29 | Stop reason: HOSPADM

## 2018-11-21 RX ORDER — MAGNESIUM SULFATE 1 G/100ML
1 INJECTION INTRAVENOUS
Status: COMPLETED | OUTPATIENT
Start: 2018-11-21 | End: 2018-11-23

## 2018-11-21 RX ORDER — PROMETHAZINE HYDROCHLORIDE 25 MG/1
25 SUPPOSITORY RECTAL EVERY 6 HOURS PRN
Status: DISCONTINUED | OUTPATIENT
Start: 2018-11-21 | End: 2018-11-29 | Stop reason: HOSPADM

## 2018-11-21 RX ORDER — ACETAMINOPHEN 325 MG/1
650 TABLET ORAL EVERY 4 HOURS PRN
Status: DISCONTINUED | OUTPATIENT
Start: 2018-11-21 | End: 2018-11-29 | Stop reason: HOSPADM

## 2018-11-21 RX ORDER — CLOPIDOGREL BISULFATE 75 MG/1
75 TABLET ORAL DAILY
Status: DISCONTINUED | OUTPATIENT
Start: 2018-11-22 | End: 2018-11-29 | Stop reason: HOSPADM

## 2018-11-21 RX ORDER — NITROGLYCERIN 20 MG/100ML
0-100 INJECTION INTRAVENOUS CONTINUOUS
Status: DISCONTINUED | OUTPATIENT
Start: 2018-11-21 | End: 2018-11-22

## 2018-11-21 RX ORDER — MORPHINE SULFATE 15 MG/1
15 TABLET, FILM COATED, EXTENDED RELEASE ORAL ONCE
Status: COMPLETED | OUTPATIENT
Start: 2018-11-21 | End: 2018-11-21

## 2018-11-21 RX ORDER — DIPHENHYDRAMINE HCL 25 MG
25 TABLET ORAL
Status: DISCONTINUED | OUTPATIENT
Start: 2018-11-21 | End: 2018-11-22

## 2018-11-21 RX ORDER — AMOXICILLIN 250 MG
2 CAPSULE ORAL 2 TIMES DAILY
Status: DISCONTINUED | OUTPATIENT
Start: 2018-11-21 | End: 2018-11-29 | Stop reason: HOSPADM

## 2018-11-21 RX ORDER — AMIODARONE HYDROCHLORIDE 200 MG/1
400 TABLET ORAL TWICE DAILY
Status: DISCONTINUED | OUTPATIENT
Start: 2018-11-21 | End: 2018-11-21 | Stop reason: CLARIF

## 2018-11-21 RX ORDER — MIDAZOLAM HYDROCHLORIDE 1 MG/ML
2 INJECTION INTRAMUSCULAR; INTRAVENOUS
Status: DISCONTINUED | OUTPATIENT
Start: 2018-11-21 | End: 2018-11-22

## 2018-11-21 RX ORDER — MORPHINE SULFATE 10 MG/ML
4 INJECTION, SOLUTION INTRAMUSCULAR; INTRAVENOUS
Status: DISCONTINUED | OUTPATIENT
Start: 2018-11-21 | End: 2018-11-21

## 2018-11-21 RX ORDER — OXYCODONE HYDROCHLORIDE 5 MG/1
5 TABLET ORAL
Status: DISCONTINUED | OUTPATIENT
Start: 2018-11-21 | End: 2018-11-29 | Stop reason: HOSPADM

## 2018-11-21 RX ORDER — BISACODYL 10 MG
10 SUPPOSITORY, RECTAL RECTAL
Status: DISCONTINUED | OUTPATIENT
Start: 2018-11-21 | End: 2018-11-29 | Stop reason: HOSPADM

## 2018-11-21 RX ORDER — POTASSIUM CHLORIDE 7.45 MG/ML
10 INJECTION INTRAVENOUS ONCE
Status: COMPLETED | OUTPATIENT
Start: 2018-11-21 | End: 2018-11-21

## 2018-11-21 RX ORDER — SODIUM CHLORIDE 9 MG/ML
INJECTION, SOLUTION INTRAVENOUS
Status: COMPLETED
Start: 2018-11-21 | End: 2018-11-21

## 2018-11-21 RX ORDER — POLYETHYLENE GLYCOL 3350 17 G/17G
1 POWDER, FOR SOLUTION ORAL
Status: DISCONTINUED | OUTPATIENT
Start: 2018-11-21 | End: 2018-11-29 | Stop reason: HOSPADM

## 2018-11-21 RX ORDER — ALUMINA, MAGNESIA, AND SIMETHICONE 2400; 2400; 240 MG/30ML; MG/30ML; MG/30ML
30 SUSPENSION ORAL EVERY 4 HOURS PRN
Status: DISCONTINUED | OUTPATIENT
Start: 2018-11-21 | End: 2018-11-29 | Stop reason: HOSPADM

## 2018-11-21 RX ORDER — ONDANSETRON 2 MG/ML
4 INJECTION INTRAMUSCULAR; INTRAVENOUS EVERY 6 HOURS PRN
Status: DISCONTINUED | OUTPATIENT
Start: 2018-11-21 | End: 2018-11-29 | Stop reason: HOSPADM

## 2018-11-21 RX ORDER — DEXMEDETOMIDINE HYDROCHLORIDE 4 UG/ML
0-1.5 INJECTION, SOLUTION INTRAVENOUS CONTINUOUS
Status: DISCONTINUED | OUTPATIENT
Start: 2018-11-21 | End: 2018-11-22

## 2018-11-21 RX ORDER — SODIUM CHLORIDE 9 MG/ML
INJECTION, SOLUTION INTRAVENOUS CONTINUOUS
Status: DISCONTINUED | OUTPATIENT
Start: 2018-11-21 | End: 2018-11-29 | Stop reason: HOSPADM

## 2018-11-21 RX ORDER — PAPAVERINE HYDROCHLORIDE 30 MG/ML
INJECTION INTRAMUSCULAR; INTRAVENOUS
Status: DISCONTINUED | OUTPATIENT
Start: 2018-11-21 | End: 2018-11-21 | Stop reason: HOSPADM

## 2018-11-21 RX ORDER — SODIUM CHLORIDE, SODIUM GLUCONATE, SODIUM ACETATE, POTASSIUM CHLORIDE AND MAGNESIUM CHLORIDE 526; 502; 368; 37; 30 MG/100ML; MG/100ML; MG/100ML; MG/100ML; MG/100ML
INJECTION, SOLUTION INTRAVENOUS PRN
Status: DISCONTINUED | OUTPATIENT
Start: 2018-11-21 | End: 2018-11-26

## 2018-11-21 RX ORDER — OXYCODONE HYDROCHLORIDE 10 MG/1
10 TABLET ORAL
Status: DISCONTINUED | OUTPATIENT
Start: 2018-11-21 | End: 2018-11-29 | Stop reason: HOSPADM

## 2018-11-21 RX ORDER — POTASSIUM CHLORIDE 14.9 MG/ML
20 INJECTION INTRAVENOUS ONCE
Status: COMPLETED | OUTPATIENT
Start: 2018-11-21 | End: 2018-11-21

## 2018-11-21 RX ORDER — GABAPENTIN 100 MG/1
100 CAPSULE ORAL 2 TIMES DAILY
Status: DISCONTINUED | OUTPATIENT
Start: 2018-11-21 | End: 2018-11-29 | Stop reason: HOSPADM

## 2018-11-21 RX ORDER — DEXTROSE MONOHYDRATE 25 G/50ML
25 INJECTION, SOLUTION INTRAVENOUS PRN
Status: ACTIVE | OUTPATIENT
Start: 2018-11-21 | End: 2018-11-22

## 2018-11-21 RX ORDER — SODIUM CHLORIDE, SODIUM GLUCONATE, SODIUM ACETATE, POTASSIUM CHLORIDE AND MAGNESIUM CHLORIDE 526; 502; 368; 37; 30 MG/100ML; MG/100ML; MG/100ML; MG/100ML; MG/100ML
1000 INJECTION, SOLUTION INTRAVENOUS
Status: COMPLETED | OUTPATIENT
Start: 2018-11-21 | End: 2018-11-22

## 2018-11-21 RX ADMIN — STANDARDIZED SENNA CONCENTRATE AND DOCUSATE SODIUM 2 TABLET: 8.6; 5 TABLET, FILM COATED ORAL at 19:44

## 2018-11-21 RX ADMIN — GABAPENTIN 100 MG: 100 CAPSULE ORAL at 19:42

## 2018-11-21 RX ADMIN — POTASSIUM CHLORIDE 20 MEQ: 14.9 INJECTION, SOLUTION INTRAVENOUS at 13:58

## 2018-11-21 RX ADMIN — SODIUM CHLORIDE, SODIUM GLUCONATE, SODIUM ACETATE, POTASSIUM CHLORIDE AND MAGNESIUM CHLORIDE 1000 ML: 526; 502; 368; 37; 30 INJECTION, SOLUTION INTRAVENOUS at 22:20

## 2018-11-21 RX ADMIN — ONDANSETRON HYDROCHLORIDE 4 MG: 2 INJECTION, SOLUTION INTRAMUSCULAR; INTRAVENOUS at 18:29

## 2018-11-21 RX ADMIN — INSULIN HUMAN 2 UNITS: 100 INJECTION, SOLUTION PARENTERAL at 05:11

## 2018-11-21 RX ADMIN — SODIUM CHLORIDE: 9 INJECTION, SOLUTION INTRAVENOUS at 14:41

## 2018-11-21 RX ADMIN — MUPIROCIN 1 APPLICATION: 20 OINTMENT TOPICAL at 05:06

## 2018-11-21 RX ADMIN — MORPHINE SULFATE 15 MG: 15 TABLET, EXTENDED RELEASE ORAL at 21:06

## 2018-11-21 RX ADMIN — SODIUM CHLORIDE, SODIUM GLUCONATE, SODIUM ACETATE, POTASSIUM CHLORIDE AND MAGNESIUM CHLORIDE: 526; 502; 368; 37; 30 INJECTION, SOLUTION INTRAVENOUS at 14:30

## 2018-11-21 RX ADMIN — OXYCODONE HYDROCHLORIDE 5 MG: 5 TABLET ORAL at 18:39

## 2018-11-21 RX ADMIN — ROSUVASTATIN CALCIUM 20 MG: 10 TABLET, FILM COATED ORAL at 19:43

## 2018-11-21 RX ADMIN — FENTANYL CITRATE 50 MCG: 50 INJECTION, SOLUTION INTRAMUSCULAR; INTRAVENOUS at 21:07

## 2018-11-21 RX ADMIN — MORPHINE SULFATE 2 MG: 10 INJECTION INTRAVENOUS at 14:11

## 2018-11-21 RX ADMIN — DEXMEDETOMIDINE HYDROCHLORIDE 0.2 MCG/KG/HR: 100 INJECTION, SOLUTION INTRAVENOUS at 17:27

## 2018-11-21 RX ADMIN — HEPARIN SODIUM 3800 UNITS: 1000 INJECTION, SOLUTION INTRAVENOUS; SUBCUTANEOUS at 02:18

## 2018-11-21 RX ADMIN — MUPIROCIN 1 APPLICATION: 20 OINTMENT TOPICAL at 19:43

## 2018-11-21 RX ADMIN — OXYCODONE HYDROCHLORIDE 5 MG: 5 TABLET ORAL at 17:29

## 2018-11-21 RX ADMIN — VANCOMYCIN HYDROCHLORIDE 1500 MG: 100 INJECTION, POWDER, LYOPHILIZED, FOR SOLUTION INTRAVENOUS at 19:59

## 2018-11-21 RX ADMIN — MORPHINE SULFATE 1 MG: 10 INJECTION INTRAVENOUS at 15:53

## 2018-11-21 RX ADMIN — MAGNESIUM SULFATE HEPTAHYDRATE 1 G: 1 INJECTION, SOLUTION INTRAVENOUS at 13:30

## 2018-11-21 RX ADMIN — POTASSIUM CHLORIDE 10 MEQ: 7.46 INJECTION, SOLUTION INTRAVENOUS at 19:59

## 2018-11-21 RX ADMIN — CALCIUM CHLORIDE 1 G: 100 INJECTION, SOLUTION INTRAVENOUS at 22:27

## 2018-11-21 RX ADMIN — SODIUM CHLORIDE, SODIUM GLUCONATE, SODIUM ACETATE, POTASSIUM CHLORIDE AND MAGNESIUM CHLORIDE 1000 ML: 526; 502; 368; 37; 30 INJECTION, SOLUTION INTRAVENOUS at 19:38

## 2018-11-21 RX ADMIN — OXYCODONE HYDROCHLORIDE 10 MG: 10 TABLET ORAL at 22:14

## 2018-11-21 ASSESSMENT — PAIN SCALES - GENERAL
PAINLEVEL_OUTOF10: 0
PAINLEVEL_OUTOF10: 8
PAINLEVEL_OUTOF10: 4
PAINLEVEL_OUTOF10: 9
PAINLEVEL_OUTOF10: 0
PAINLEVEL_OUTOF10: 5
PAINLEVEL_OUTOF10: 7

## 2018-11-21 ASSESSMENT — COPD QUESTIONNAIRES
DURING THE PAST 4 WEEKS HOW MUCH DID YOU FEEL SHORT OF BREATH: SOME OF THE TIME
COPD SCREENING SCORE: 3
DO YOU EVER COUGH UP ANY MUCUS OR PHLEGM?: NO/ONLY WITH OCCASIONAL COLDS OR INFECTIONS
HAVE YOU SMOKED AT LEAST 100 CIGARETTES IN YOUR ENTIRE LIFE: NO/DON'T KNOW

## 2018-11-21 ASSESSMENT — PULMONARY FUNCTION TESTS: FVC: 1.4

## 2018-11-21 ASSESSMENT — LIFESTYLE VARIABLES: EVER_SMOKED: NEVER

## 2018-11-21 NOTE — PROGRESS NOTES
Discussed anatomy and physiology of cardiac surgery with patient and family to include pre-op regimen. Reviewed post-op expectations to include  the use of incentive spirometry with return demonstration, ventilator management, cardiac monitoring, tubes and drains, early ambulation, and expected length of stay. Patient and family state full understanding of all information given.

## 2018-11-21 NOTE — DIETARY
"Nutrition Services: Consult cardiac rehab diet education (repeat); wound(s) per nutritional admit screen.    Pt is 68 y.o. Male with Dx: Unstable angina (HCC), NSTEMI (non-ST elevated myocardial infarction) (HCC)    Admit day 2.  Diet education was provided to pt by RD yesterday 11/20; see Education tab for documentation.  Wound noted to R heel/ankle, reportedly \"old staph infection surgical site\". No Wound Team consult.    No RD interventions @ this time.   "

## 2018-11-21 NOTE — PROGRESS NOTES
2 RN skin check complete    Skin fully intact except for slow healing wound on Right heel. Pt states he had achilles surgery about a year ago and the site got infected with staph. Pt states he completed abx therapy with via PICC line for 5 weeks, however the wound still remains.

## 2018-11-21 NOTE — OP REPORT
DATE OF SERVICE:  11/21/2018    REFERRING PHYSICIAN:  Liam Trevizo MD    PREOPERATIVE DIAGNOSES:  Severe multivessel coronary artery disease, non-ST   elevation myocardial infarction, diabetes mellitus type 2, hypertension,   obesity.    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    PROCEDURES PERFORMED:  Coronary artery bypass grafting x3 (left internal   mammary artery to mid left anterior descending artery, reverse saphenous vein   graft to posterior descending artery, reverse saphenous vein graft to diagonal   branch), endoscopic vein harvesting, excision of mediastinal mass,   transesophageal echocardiography.    SURGEON:  Mohan Verdin MD    FIRST ASSISTANT:  PRATEEK Poe    SECOND ASSISTANT:  Jeffy Reddy MD    ANESTHESIA:  General.    ANESTHESIOLOGIST:  Jeffy Villegas MD    PACING WIRES:  Temporary monopolar epicardial ventricular pacing wires x2.    CHEST TUBES:  32-Moroccan straight and right angle chest tubes were placed in   the mediastinum and a 24-Moroccan Bakari was placed in the left pleural space.    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  It was sent to pathology for permanent.  Normal caliber ascending aorta without   calcification or atherosclerotic disease.  The left internal mammary artery   was 1.75 mm with excellent flow.  The right greater saphenous vein was of   normal caliber and good quality with minimal varicosities.  The posterior   descending artery was 1.75 mm and a good target.  The first diagonal branch   was 1.75 mm thin-walled and intramyocardial.  The mid left anterior descending   artery was 2 mm, diffusely diseased, and a good target.  Fragile arterial   tissue.  Grossly normal  biventricular function pre cardiopulmonary bypass with   normal wall motion abnormalities.  The patient was hemodynamically stable on   an epinephrine infusion.    INDICATIONS FOR PROCEDURE:  This is a 68-year-old man who presented with   severe acute recurrent chest pain, was found to have non-ST elevation   myocardial infarction and severe multivessel coronary artery disease.  On   coronary angiogram, he was found to have a high-grade proximal left anterior   descending artery stenosis, high-grade second diagonal branch mid stenosis,   and high-grade mid right coronary artery stenosis.  Ejection fraction was   estimated at 55% with distal apical and diaphragmatic hypokinesis and LVEDP of   20.  The patient was subsequently referred to me for coronary artery bypass   grafting.  The risks and benefits of CABG were discussed with the patient.    Risks including myocardial infarction, stroke, prolonged ventilation,   pneumonia, tracheostomy, renal failure, permanent pacemaker, bleeding, sternal   wound infection, sternal dehiscence as well as death.  The patient understood   these risks and wished to proceed with the procedure.    PROCEDURE IN DETAIL:  The patient was brought to the operating room and placed   on the operating table in supine position.  A radial arterial line was placed   and general anesthesia was induced.  A central line was then placed by the   anesthesiologist.  Munoz catheter was inserted.  Transesophageal   echocardiography revealed no significant valvular disease and grossly normal   biventricular function.  The patient was prepped and draped in the usual   sterile fashion.  A 2-team approach was utilized and the right greater   saphenous vein was harvested endoscopically.  After it was harvested, it was   checked for leaks and was irrigated with heparinized saline.  Side branches   were ligated with Hemoclips.  The leg was then closed in standard fashion and   wrapped with an Ace wrap.  Graciela  PRATEEK Street, then assisted me throughout the   remainder of the operation with the exception of weaning off bypass.  This was   when Dr. Reddy served as my first assistant.  Simultaneously, a standard   median sternotomy incision was performed and carried through the subcutaneous   tissue and using Bovie electrocautery, the sternum was scored in the midline   and divided with a reciprocating saw.  The left internal mammary artery was   harvested in a standard pedicle fashion.  Side branches were ligated with   Hemoclips and divided.  The patient was then given systemic heparin.  The left   internal mammary artery was clipped and divided and was soaked in a   papaverine solution.  Of note, there were numerous left lung adhesions from   previous lung resection.  These were taken down to the best of my ability with   Bovie electrocautery.  Next, the pericardium was opened in the midline and   T'd off at the diaphragm and pericardial stay sutures were placed creating a   pericardial well.  Approximately 4 cm mediastinal mass was excised from the   thymic fat carefully using Bovie electrocautery.  After it was excised, some   clear fluid filled from the mass making me think it was likely a thymic cyst.    It was sent to pathology.  Two concentric pursestring sutures were placed in   the ascending aorta.  An additional pursestring was placed in the right atrial   appendage.  The ascending aorta was then cannulated with a 21-Mexican EZ glide   cannula.  The cannula was de-aired and connected to a cardiopulmonary bypass   circuit.  Right atrial appendage was then cannulated with a dual stage venous   cannula and was then connected to the bypass circuit.  An antegrade   cardioplegia needle was then placed in the ascending aorta.  An additional   pursestring was placed.  All lines were then connected and when adequate ACT   was reached, the patient was placed on full cardiopulmonary bypass.  The   ascending aorta was  crossclamped and the heart was arrested with cold   antegrade cardioplegia.  A total of 900 mL were given.  The heart was packed   in topical slush for additional topical cooling.  We then gave antegrade   cardioplegia every 20 minutes thereafter to ensure adequate myocardial   protection.  We then examined our distal targets as above.  The heart was   positioned appropriately.  An arteriotomy was made in the posterior descending   artery.  An end-to-side anastomosis was then made with running 7-0 Prolene   using reverse saphenous vein graft to the posterior descending artery.  The   vein was then flushed and the anastomosis was inspected for leaks, which there   were none.  We then gave a dose of antegrade cardioplegia down the vein graft   as well as the aortic root.  The heart was repositioned and the second   diagonal branch was exposed.  The mid diagonal lesion was palpated.  An   arteriotomy was made just distal to the lesion.  Artery was very   intramyocardial and the arterial tissue itself was quite fragile.  Once the   arteriotomy was made, the diagonal branch and an end-to-side anastomosis were   then constructed with running 7-0 Prolene using reverse saphenous vein.  The   vein was then flushed and inspected for leaks and then repair stitches were   placed in the lateral aspect of the anastomosis.  The tissue was quite   fragile.  We then gave a dose of antegrade cardioplegia down both vein grafts   as well as down the aortic root.  The heart was then repositioned and the mid   left anterior descending artery was exposed.  The left internal mammary artery   was prepared and spatulated.  An arteriotomy was made in the left anterior   descending artery and end-to-side anastomosis was then constructed with   running 7-0 Prolene utilizing the left internal mammary artery to the left   anterior descending artery.  The bulldog was removed and a flush of red blood   in the distribution of the LAD indicated a  patent anastomosis.  The aortic   crossclamp was then removed and then replaced with a side-biting clamp across   the ascending aorta.  An aortotomy was then made with a 4.5 mm punch.  The   saphenous vein graft to the posterior descending artery was trimmed and   spatulated and a proximal anastomosis was then performed using a running 6-0   Prolene suture utilizing the reverse saphenous vein graft to the posterior   descending artery.  Antegrade cardioplegia needle was then removed.  An   additional aortotomy was then made with a 4.5 mm punch at the site of the   previous antegrade cardioplegia site and then an additional proximal   anastomosis was performed of the reverse saphenous vein graft to the second   diagonal branch using running 6-0 Prolene.  The ascending aorta was then   de-aired with a 20-gauge needle and the aortic side-biting clamp was then   removed.  Large clips were placed alongside the proximal anastomoses for   identification purposes.  The heart returned to normal sinus rhythm   spontaneously and maintained sinus rhythm during the rewarming process.    Temporary monopolar ventricular pacing wires were placed along the   diaphragmatic surface of the heart.  They will turn underneath the skin and   secured appropriately.  I then placed a total of three chest tubes; 32-Vietnamese   straight and right angle chest tubes were placed in the mediastinum, and a   24-Vietnamese Bakari was placed in the left pleural space.  All chest tubes were   secured appropriately with sutures.  The patient was then rewarmed to a   bladder temperature of 36.5 degree Celsius, at which time we weaned him from   cardiopulmonary bypass without difficulty.  At this point, protamine was   given.  The venous cannula was then removed and then the pursestring was tied   down.  It was then reinforced with a silk suture.  Once all blood volume had   been transfused back to the patient, the aortic cannula was then removed and   the  pursestring sutures were then tied down.  I then inspected all my proximal   and distal anastomoses as well as cannulation sites and found them to be   hemostatic.  The sternum was then reapproximated with steel wires.  The linea   alba, subcutaneous tissue, and skin were closed in standard fashion in layers.    The sternal incision was then covered with a Prevena dressing.  All sponge,   needle, and instrument counts reported as correct.  The patient was returned   to the intensive care unit in stable condition.    Total cardiopulmonary bypass time was under 109 minutes and total aortic   crossclamp time was 61 minutes.       ____________________________________     MD KAILA Delong / IRMA    DD:  11/21/2018 12:39:39  DT:  11/21/2018 13:11:35    D#:  [id number]  Job#:  [id number]

## 2018-11-21 NOTE — CARE PLAN
Problem: Pre Op  Goal: Optimal preparation for CABG/Heart Valve surgery    Intervention: Pre Op education to patient/significant other. Provide patient [facility] Patient Guideline for Cardiac Surgery (See Pt. Ed.)  Patient educated by Heart trained nurse Heidy COLES. Wife at bedside also received education. Pt and wife verbally demonstrate understanding  Intervention: Consents obtained for Surgery, Anesthesia and Transfusion/Bloodless Program Participant  Consent for sugery and blood transfusion signed and placed in chart  Intervention: Pre op checklist completed. Admit profile completed. Advanced directive verified.  Everything complete. Pt does not have advanced directive    Intervention: Pre op labs per MD order: CBC, CMP, INR, PTT, COD if not done in past 72 hours.  HbA1C if diabetic.  All labs done. See results review  Intervention: Pre op diagnostics per MD order (EKG, CXR, Bilateral carotid doppler study and vein mapping)  Complete. SEE EMAR  Intervention: Baseline assessment documented to include IS volume, weight, bilateral BP and peripheral pulses.  Done. See progress note. Radial, tibal and pedal 2+. Baseline IS is 2750. Weight:102.9kg on stand up scale  Intervention: NPO at midnight except cardiac medications. (No ASA, coumadin or Plavix)  Patient NPO at midnight  Intervention: Shower with chlorhexidine x 2  Done  Intervention: Prep with clippers  DOne  Intervention: Remove dentures, valuables and jewelry  Done. All belongings sent home with wife  Intervention: Determine if patient is taking Beta Blockers  Patient did not received due to first degree heart block  Intervention: Cardiac/BP meds and Mupirocin ointment given prior to surgery. Verify orders for hold or administer of anticoags.  Done  Intervention: If diabetic, administer insulin night before surgery  No insulin ordered  Intervention: If diabetic, FSBS in am and follow sliding scale if indicated  Done. 2 Units of insulin for FSBS:117  Intervention: Pre  op antibiotics sent to surgery with patient per MD order (surgery to administer)  Not on floor. Should be sent to OR from pharmacy   Intervention: Medical record sent to surgery with current H&P  Done  Intervention: Transport to surgery with cardiac monitor and oxygen  Done

## 2018-11-21 NOTE — PROCEDURES
ARTERIAL LINE  Start time 0726  End time 0730      Site:    Radial    Laterality:   left    Complications: None    Additional notes:  Arrow 20g followed by 6in 20g cath.        Jeffy Villegas M.D.  11/21/2018  10:19 AM

## 2018-11-21 NOTE — PROGRESS NOTES
Report given to SANKET Tavares from [facility] at the bedside. Pt belongings sent with pt, family notified, and chart and pt medications sent with pt.

## 2018-11-21 NOTE — PROGRESS NOTES
Bedside report received from SANKET Owens. Assumed care of patient. Patient transported to [facility] on monitor.

## 2018-11-21 NOTE — PROGRESS NOTES
Alexia GRAHAM notified of non healing wound on Right ankle. Received instructions to cover in gauze

## 2018-11-21 NOTE — PROGRESS NOTES
BP Left arm:120/84 (95)  BP Right Arm: 133/80 (93)    Baseline IS: 2750    R radial pulse: 2+  L radial pulse: 2+  R Post-tibial pulse: 2+  L Post tibial pulse: 2+  R Pedal pulse:2+  L Pedal pulse: 2+    2 CHG baths complete.  Pt fully clipped.  All labs completed.     No advanced directive on file

## 2018-11-21 NOTE — OR SURGEON
Immediate Post OP Note    PreOp Diagnosis: CAD    PostOp Diagnosis: CAD    Procedure(s):  MULTIPLE CORONARY ARTERY BYPASS x3, RIGHT LEG ENDOSCOPIC VEIN HARVEST - Wound Class: Clean with Drain  GOOD - Wound Class: None    Surgeon(s):  Mohan Verdin M.D.    First assistant:  PRATEEK Poe    Second assistant:  Jeffy Reddy M.D.      Anesthesiologist/Type of Anesthesia:  Anesthesiologist: Jeffy Villegas M.D./General    Surgical Staff:  Circulator: Feliciano Kilpatrick R.N.; Marlen Chavez R.N.  Perfusionist: Monik Webb  Relief Circulator: Chanelle Simpson R.N.  Scrub Person: Tess Frias; Susan Cid    Specimens removed if any:  ID Type Source Tests Collected by Time Destination   A : Medial stinal mass Tissue Heart PATHOLOGY SPECIMEN Mohan Verdin M.D. 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 Sent to pathology. Normal caliber ascending aorta without calcification or atherosclerotic disease. LIMA was 1.75mm with excellent flow. Right greater saphenous vein was of normal caliber and good quality with minimal varicosities. PDA was 1.75mm and a good target. D1 was 1.75mm, thin-walled and intramyocardial. Mid LAD was 2mm, diffusely diseased and a good target. Fragile arterial tissue. Grossly normal biventricular function pre and post-CPB with no WMA. Pt was HDS on an epinephrine infusion.    Complications: none        11/21/2018 12:23 PM Mohan Verdin M.D.

## 2018-11-21 NOTE — PROGRESS NOTES
"Pt found sitting at edge of bed using urinal.  RN instructed pt to lay back down in bed and RN explained that he needs to stay on bedrest until 7pm. Pt said, \"I know. I can't use the urinal laying down. I will get pee on myself.\"  Pt instructed that the bed can be adjusted and he can be assisted as needed when he needs to void. Pt stated, \"I understand, I just don't care right now with everything going on.\" Pt and wife educated on the risk of bleeding out if he does not allow the artery to clot.  Pt and wife educated that he must lay flat until 7pm.  Pt and wife verbalized understanding.   "

## 2018-11-21 NOTE — PROGRESS NOTES
Pt back from cath lab. Vitals signs stable. Post procedural vitals signs initiated. Pt has a right groin site and right radial site. Pt instructed that he will have to be on bedrest for 6 hours post surgery. Pt verbalized understanding.
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
## 2018-11-22 ENCOUNTER — APPOINTMENT (OUTPATIENT)
Dept: RADIOLOGY | Facility: MEDICAL CENTER | Age: 68
DRG: 233 | End: 2018-11-22
Attending: NURSE PRACTITIONER
Payer: COMMERCIAL

## 2018-11-22 LAB
ANION GAP SERPL CALC-SCNC: 8 MMOL/L (ref 0–11.9)
BUN SERPL-MCNC: 16 MG/DL (ref 8–22)
CALCIUM SERPL-MCNC: 8.2 MG/DL (ref 8.5–10.5)
CHLORIDE SERPL-SCNC: 109 MMOL/L (ref 96–112)
CO2 SERPL-SCNC: 21 MMOL/L (ref 20–33)
CREAT SERPL-MCNC: 1.05 MG/DL (ref 0.5–1.4)
EKG IMPRESSION: NORMAL
ERYTHROCYTE [DISTWIDTH] IN BLOOD BY AUTOMATED COUNT: 51.7 FL (ref 35.9–50)
GLUCOSE BLD-MCNC: 104 MG/DL (ref 65–99)
GLUCOSE BLD-MCNC: 105 MG/DL (ref 65–99)
GLUCOSE BLD-MCNC: 106 MG/DL (ref 65–99)
GLUCOSE BLD-MCNC: 112 MG/DL (ref 65–99)
GLUCOSE BLD-MCNC: 113 MG/DL (ref 65–99)
GLUCOSE BLD-MCNC: 127 MG/DL (ref 65–99)
GLUCOSE BLD-MCNC: 67 MG/DL (ref 65–99)
GLUCOSE BLD-MCNC: 81 MG/DL (ref 65–99)
GLUCOSE BLD-MCNC: 83 MG/DL (ref 65–99)
GLUCOSE BLD-MCNC: 83 MG/DL (ref 65–99)
GLUCOSE BLD-MCNC: 90 MG/DL (ref 65–99)
GLUCOSE BLD-MCNC: 96 MG/DL (ref 65–99)
GLUCOSE SERPL-MCNC: 141 MG/DL (ref 65–99)
HCT VFR BLD AUTO: 41.1 % (ref 42–52)
HGB BLD-MCNC: 14.2 G/DL (ref 14–18)
MCH RBC QN AUTO: 34.1 PG (ref 27–33)
MCHC RBC AUTO-ENTMCNC: 34.5 G/DL (ref 33.7–35.3)
MCV RBC AUTO: 98.8 FL (ref 81.4–97.8)
PLATELET # BLD AUTO: 266 K/UL (ref 164–446)
PMV BLD AUTO: 9.1 FL (ref 9–12.9)
POTASSIUM SERPL-SCNC: 4.5 MMOL/L (ref 3.6–5.5)
RBC # BLD AUTO: 4.16 M/UL (ref 4.7–6.1)
SODIUM SERPL-SCNC: 138 MMOL/L (ref 135–145)
WBC # BLD AUTO: 13.5 K/UL (ref 4.8–10.8)

## 2018-11-22 PROCEDURE — A9270 NON-COVERED ITEM OR SERVICE: HCPCS | Performed by: NURSE PRACTITIONER

## 2018-11-22 PROCEDURE — 85027 COMPLETE CBC AUTOMATED: CPT

## 2018-11-22 PROCEDURE — 94668 MNPJ CHEST WALL SBSQ: CPT

## 2018-11-22 PROCEDURE — 94667 MNPJ CHEST WALL 1ST: CPT

## 2018-11-22 PROCEDURE — 700101 HCHG RX REV CODE 250: Performed by: NURSE PRACTITIONER

## 2018-11-22 PROCEDURE — 71045 X-RAY EXAM CHEST 1 VIEW: CPT

## 2018-11-22 PROCEDURE — 700102 HCHG RX REV CODE 250 W/ 637 OVERRIDE(OP): Performed by: THORACIC SURGERY (CARDIOTHORACIC VASCULAR SURGERY)

## 2018-11-22 PROCEDURE — 700102 HCHG RX REV CODE 250 W/ 637 OVERRIDE(OP): Performed by: INTERNAL MEDICINE

## 2018-11-22 PROCEDURE — 80048 BASIC METABOLIC PNL TOTAL CA: CPT

## 2018-11-22 PROCEDURE — 700102 HCHG RX REV CODE 250 W/ 637 OVERRIDE(OP): Performed by: NURSE PRACTITIONER

## 2018-11-22 PROCEDURE — 94640 AIRWAY INHALATION TREATMENT: CPT

## 2018-11-22 PROCEDURE — 93010 ELECTROCARDIOGRAM REPORT: CPT | Performed by: INTERNAL MEDICINE

## 2018-11-22 PROCEDURE — 82962 GLUCOSE BLOOD TEST: CPT | Mod: 91

## 2018-11-22 PROCEDURE — 700111 HCHG RX REV CODE 636 W/ 250 OVERRIDE (IP): Performed by: NURSE PRACTITIONER

## 2018-11-22 PROCEDURE — 770022 HCHG ROOM/CARE - ICU (200)

## 2018-11-22 PROCEDURE — P9045 ALBUMIN (HUMAN), 5%, 250 ML: HCPCS | Mod: JG | Performed by: NURSE PRACTITIONER

## 2018-11-22 PROCEDURE — A9270 NON-COVERED ITEM OR SERVICE: HCPCS | Performed by: THORACIC SURGERY (CARDIOTHORACIC VASCULAR SURGERY)

## 2018-11-22 PROCEDURE — 700105 HCHG RX REV CODE 258: Performed by: NURSE PRACTITIONER

## 2018-11-22 PROCEDURE — A9270 NON-COVERED ITEM OR SERVICE: HCPCS | Performed by: INTERNAL MEDICINE

## 2018-11-22 PROCEDURE — 93005 ELECTROCARDIOGRAM TRACING: CPT | Performed by: NURSE PRACTITIONER

## 2018-11-22 PROCEDURE — 99232 SBSQ HOSP IP/OBS MODERATE 35: CPT | Mod: 25 | Performed by: INTERNAL MEDICINE

## 2018-11-22 RX ORDER — SODIUM CHLORIDE 9 MG/ML
INJECTION, SOLUTION INTRAVENOUS
Status: ACTIVE
Start: 2018-11-22 | End: 2018-11-22

## 2018-11-22 RX ORDER — TRAMADOL HYDROCHLORIDE 50 MG/1
50 TABLET ORAL EVERY 6 HOURS PRN
Status: DISCONTINUED | OUTPATIENT
Start: 2018-11-22 | End: 2018-11-29 | Stop reason: HOSPADM

## 2018-11-22 RX ORDER — BENZOCAINE/MENTHOL 6 MG-10 MG
LOZENGE MUCOUS MEMBRANE PRN
Status: DISCONTINUED | OUTPATIENT
Start: 2018-11-22 | End: 2018-11-29 | Stop reason: HOSPADM

## 2018-11-22 RX ORDER — ALBUMIN, HUMAN INJ 5% 5 %
25 SOLUTION INTRAVENOUS ONCE
Status: COMPLETED | OUTPATIENT
Start: 2018-11-22 | End: 2018-11-22

## 2018-11-22 RX ORDER — HYDROCODONE BITARTRATE AND ACETAMINOPHEN 5; 325 MG/1; MG/1
1-2 TABLET ORAL EVERY 4 HOURS PRN
Status: DISCONTINUED | OUTPATIENT
Start: 2018-11-22 | End: 2018-11-29 | Stop reason: HOSPADM

## 2018-11-22 RX ADMIN — FENTANYL CITRATE 50 MCG: 50 INJECTION, SOLUTION INTRAMUSCULAR; INTRAVENOUS at 00:43

## 2018-11-22 RX ADMIN — OXYCODONE HYDROCHLORIDE 10 MG: 10 TABLET ORAL at 03:32

## 2018-11-22 RX ADMIN — OXYCODONE HYDROCHLORIDE 10 MG: 10 TABLET ORAL at 08:10

## 2018-11-22 RX ADMIN — HYDROCODONE BITARTRATE AND ACETAMINOPHEN 2 TABLET: 5; 325 TABLET ORAL at 21:34

## 2018-11-22 RX ADMIN — ENOXAPARIN SODIUM 40 MG: 100 INJECTION SUBCUTANEOUS at 17:12

## 2018-11-22 RX ADMIN — GABAPENTIN 100 MG: 100 CAPSULE ORAL at 05:52

## 2018-11-22 RX ADMIN — PHENYLEPHRINE HYDROCHLORIDE 10 MCG/MIN: 10 INJECTION INTRAVENOUS at 08:16

## 2018-11-22 RX ADMIN — FENTANYL CITRATE 50 MCG: 50 INJECTION, SOLUTION INTRAMUSCULAR; INTRAVENOUS at 09:26

## 2018-11-22 RX ADMIN — INSULIN HUMAN 1 UNITS: 100 INJECTION, SOLUTION PARENTERAL at 11:03

## 2018-11-22 RX ADMIN — FENTANYL CITRATE 50 MCG: 50 INJECTION, SOLUTION INTRAMUSCULAR; INTRAVENOUS at 05:08

## 2018-11-22 RX ADMIN — CALCIUM CHLORIDE 1 G: 100 INJECTION, SOLUTION INTRAVENOUS at 08:39

## 2018-11-22 RX ADMIN — ASPIRIN 81 MG: 81 TABLET, COATED ORAL at 05:52

## 2018-11-22 RX ADMIN — STANDARDIZED SENNA CONCENTRATE AND DOCUSATE SODIUM 2 TABLET: 8.6; 5 TABLET, FILM COATED ORAL at 17:12

## 2018-11-22 RX ADMIN — STANDARDIZED SENNA CONCENTRATE AND DOCUSATE SODIUM 2 TABLET: 8.6; 5 TABLET, FILM COATED ORAL at 05:52

## 2018-11-22 RX ADMIN — HYDROCORTISONE: 1 CREAM TOPICAL at 11:51

## 2018-11-22 RX ADMIN — MUPIROCIN 1 APPLICATION: 20 OINTMENT TOPICAL at 17:12

## 2018-11-22 RX ADMIN — HYDROCODONE BITARTRATE AND ACETAMINOPHEN 2 TABLET: 5; 325 TABLET ORAL at 15:29

## 2018-11-22 RX ADMIN — ALPRAZOLAM 0.25 MG: 0.25 TABLET ORAL at 21:34

## 2018-11-22 RX ADMIN — INSULIN HUMAN 1 UNITS: 100 INJECTION, SOLUTION PARENTERAL at 07:15

## 2018-11-22 RX ADMIN — CLOPIDOGREL 75 MG: 75 TABLET, FILM COATED ORAL at 05:52

## 2018-11-22 RX ADMIN — ACETAMINOPHEN 650 MG: 325 TABLET, FILM COATED ORAL at 21:34

## 2018-11-22 RX ADMIN — TRAMADOL HYDROCHLORIDE 50 MG: 50 TABLET, FILM COATED ORAL at 19:14

## 2018-11-22 RX ADMIN — SODIUM CHLORIDE: 9 INJECTION, SOLUTION INTRAVENOUS at 01:23

## 2018-11-22 RX ADMIN — DEXMEDETOMIDINE HYDROCHLORIDE 0.2 MCG/KG/HR: 100 INJECTION, SOLUTION INTRAVENOUS at 02:40

## 2018-11-22 RX ADMIN — INSULIN HUMAN 5 UNITS: 100 INJECTION, SUSPENSION SUBCUTANEOUS at 11:00

## 2018-11-22 RX ADMIN — OXYCODONE HYDROCHLORIDE 10 MG: 10 TABLET ORAL at 12:45

## 2018-11-22 RX ADMIN — MAGNESIUM SULFATE HEPTAHYDRATE 1 G: 1 INJECTION, SOLUTION INTRAVENOUS at 12:46

## 2018-11-22 RX ADMIN — INSULIN HUMAN 5 UNITS: 100 INJECTION, SUSPENSION SUBCUTANEOUS at 21:10

## 2018-11-22 RX ADMIN — GABAPENTIN 100 MG: 100 CAPSULE ORAL at 17:12

## 2018-11-22 RX ADMIN — ALBUMIN (HUMAN) 25 G: 2.5 SOLUTION INTRAVENOUS at 09:04

## 2018-11-22 RX ADMIN — OXYCODONE HYDROCHLORIDE 10 MG: 10 TABLET ORAL at 17:12

## 2018-11-22 RX ADMIN — ROSUVASTATIN CALCIUM 20 MG: 10 TABLET, FILM COATED ORAL at 17:12

## 2018-11-22 RX ADMIN — MUPIROCIN 1 APPLICATION: 20 OINTMENT TOPICAL at 05:52

## 2018-11-22 RX ADMIN — HYDROCODONE BITARTRATE AND ACETAMINOPHEN 2 TABLET: 5; 325 TABLET ORAL at 10:56

## 2018-11-22 RX ADMIN — EPINEPHRINE 0.07 MCG/KG/MIN: 1 INJECTION INTRAMUSCULAR; INTRAVENOUS; SUBCUTANEOUS at 07:04

## 2018-11-22 RX ADMIN — ACETAMINOPHEN 650 MG: 325 TABLET, FILM COATED ORAL at 05:52

## 2018-11-22 ASSESSMENT — PAIN SCALES - GENERAL
PAINLEVEL_OUTOF10: 7
PAINLEVEL_OUTOF10: 7
PAINLEVEL_OUTOF10: 8
PAINLEVEL_OUTOF10: 5
PAINLEVEL_OUTOF10: 6
PAINLEVEL_OUTOF10: 6
PAINLEVEL_OUTOF10: 7
PAINLEVEL_OUTOF10: 7
PAINLEVEL_OUTOF10: 9
PAINLEVEL_OUTOF10: 8
PAINLEVEL_OUTOF10: 7
PAINLEVEL_OUTOF10: 5
PAINLEVEL_OUTOF10: 7
PAINLEVEL_OUTOF10: 7
PAINLEVEL_OUTOF10: 5
PAINLEVEL_OUTOF10: 8
PAINLEVEL_OUTOF10: 7
PAINLEVEL_OUTOF10: 5
PAINLEVEL_OUTOF10: 7
PAINLEVEL_OUTOF10: 8
PAINLEVEL_OUTOF10: 5
PAINLEVEL_OUTOF10: 6

## 2018-11-22 ASSESSMENT — ENCOUNTER SYMPTOMS
HEADACHES: 0
SHORTNESS OF BREATH: 0
BLOOD IN STOOL: 0
CONSTITUTIONAL NEGATIVE: 1
PSYCHIATRIC NEGATIVE: 1
COUGH: 1
EYE DISCHARGE: 0
SHORTNESS OF BREATH: 1
DIZZINESS: 0
NEUROLOGICAL NEGATIVE: 1
GASTROINTESTINAL NEGATIVE: 1
MYALGIAS: 1
WEAKNESS: 1
BACK PAIN: 1
MUSCULOSKELETAL NEGATIVE: 1
FEVER: 0
CHILLS: 0
NAUSEA: 0
BRUISES/BLEEDS EASILY: 0
HALLUCINATIONS: 0
PALPITATIONS: 0
VOMITING: 0
EYE PAIN: 0
COUGH: 0
ABDOMINAL PAIN: 0
CARDIOVASCULAR NEGATIVE: 1
MYALGIAS: 0
EYES NEGATIVE: 1

## 2018-11-22 NOTE — CONSULTS
Critical Care Consultation    Date of consult: 11/21/2018    Referring Physician  Mohan Verdin M.D.    Reason for Consultation  S/P CABG ICU management    History of Presenting Illness  68 y.o. male who presented 11/19/2018 with multi-vessel CAD s/p elective CABG earlier today    Code Status  Full Code    Review of Systems  Review of Systems   Unable to perform ROS: Intubated       Past Medical History   has a past medical history of Diabetes (HCC); Hypertension; and Upper GI bleed (2007).    Surgical History   has a past surgical history that includes cervical laminectomy posterior; lumbar laminectomy diskectomy; carpal tunnel release (Bilateral); nerve ulnar transfer (Bilateral); multiple coronary artery bypass endo vein harvest (11/21/2018); and syd (11/21/2018).    Family History  family history includes Dementia in his father and mother; Heart Attack (age of onset: 86) in his father.    Social History   reports that he has never smoked. He has never used smokeless tobacco. He reports that he drinks alcohol. He reports that he does not use drugs.    Medications  Home Medications     Reviewed by Marlen Chavez R.N. (Registered Nurse) on 11/21/18 at 0856  Med List Status: Complete   Medication Last Dose Status   acetaminophen (TYLENOL) tablet 650 mg  Active   ALPRAZolam (XANAX) tablet 0.25 mg  Active   aminocaproic acid (AMICAR) 10 g in  mL IV Bolus  Active   aminocaproic acid (AMICAR) 5 g in  mL Infusion  Active   amiodarone (CORDARONE) tablet 400 mg  Active   aspirin 81 MG tablet 11/19/2018 Active   aspirin EC (ECOTRIN) tablet 81 mg  Active   bisacodyl (DULCOLAX) suppository 10 mg  Active   dexmedetomidine (PRECEDEX) 400 mcg/100mL NS premix infusion  Active   EPINEPHrine 4 mg in  mL Infusion  Active   gabapentin (NEURONTIN) 100 MG Cap 11/19/2018 Active   heparin infusion 25,000 units in 500 ml 0.45% nacl  Active   heparin injection 3,800 Units  Active   insulin regular human  (HUMULIN/NOVOLIN R) 62.5 Units in  mL infusion per protocol  Active   lidocaine (XYLOCAINE) 1 % injection 0.5 mL  Active   lisinopril (PRINIVIL) 20 MG Tab 11/19/2018 Active   lisinopril (PRINIVIL) tablet 20 mg  Active   magnesium hydroxide (MILK OF MAGNESIA) suspension 30 mL  Active   metoprolol (LOPRESSOR) tablet 12.5 mg  Active   metoprolol (LOPRESSOR) tablet 12.5 mg  Active   morphine (pf) 10 mg/ml 10 MG/ML injection 2-4 mg  Active   mupirocin (BACTROBAN) 2 % ointment  Active   nitroglycerin (NITRO-BID) 2 % ointment 1 Inch  Active   nitroglycerin (NITROSTAT) tablet 0.4 mg  Active   NS infusion  Active   ondansetron (ZOFRAN ODT) dispertab 4 mg  Active   ondansetron (ZOFRAN) syringe/vial injection 4 mg  Active   polyethylene glycol/lytes (MIRALAX) PACKET 1 Packet  Active   Respiratory Care per Protocol  Active   rosuvastatin (CRESTOR) tablet 20 mg  Active   senna-docusate (PERICOLACE or SENOKOT S) 8.6-50 MG per tablet 2 Tab  Active   vancomycin 1500 mg/250mL NS IVPB premix  Active   verapamil (ISOPTIN) 40 MG Tab 11/19/2018 Active              Current Facility-Administered Medications   Medication Dose Route Frequency Provider Last Rate Last Dose   • gabapentin (NEURONTIN) capsule 100 mg  100 mg Oral BID Graciela Street, A.P.N.       • Respiratory Care per Protocol   Nebulization Continuous RT Graciela Street, A.P.N.       • NS infusion   Intravenous Continuous Graciela Street, A.P.N. 10 mL/hr at 11/21/18 1441     • K+ Scale: Goal of 4.5  1 Each Intravenous Q6HRS Graciela Duttont, A.P.N.   1 Each at 11/21/18 1315   • Pharmacy Consult Request ...Pain Management Review 1 Each  1 Each Other PRN Graciela Street, A.P.N.       • senna-docusate (PERICOLACE or SENOKOT S) 8.6-50 MG per tablet 2 Tab  2 Tab Oral BID Graciela Street, A.P.N.        And   • polyethylene glycol/lytes (MIRALAX) PACKET 1 Packet  1 Packet Oral QDAY PRN Graciela Street, A.P.N.        And   • magnesium hydroxide (MILK OF MAGNESIA)  suspension 30 mL  30 mL Oral QDAY PRN Graciela Street, A.P.N.        And   • bisacodyl (DULCOLAX) suppository 10 mg  10 mg Rectal QDAY PRN Graciela Street, A.P.N.       • electrolyte-A (PLASMALYTE-A) infusion   Intravenous PRN Graciela Street, A.P.N.       • [START ON 11/22/2018] enoxaparin (LOVENOX) inj 40 mg  40 mg Subcutaneous QDAY Graciela Street, A.P.N.       • vancomycin 1,500 mg in  mL IVPB  1.5 g Intravenous Once Graciela Street, A.P.N.       • mupirocin (BACTROBAN) 2 % ointment 1 Application  1 Application Topical BID Graciela Street, A.P.N.       • Magnesium Sulfate in D5W IVPB premix 1 g  1 g Intravenous QDAY Graciela Street, A.P.N.   Stopped at 11/21/18 1430   • [START ON 11/22/2018] metoprolol (LOPRESSOR) tablet 12.5 mg  12.5 mg Oral BID Graciela Street, A.P.N.        Followed by   • [START ON 11/23/2018] metoprolol (LOPRESSOR) tablet 25 mg  25 mg Oral BID Graciela Street, A.P.N.       • [START ON 11/22/2018] clopidogrel (PLAVIX) tablet 75 mg  75 mg Oral DAILY Graciela Street, A.P.N.       • clevidipine (CLEVIPREX) IV emulsion  1-21 mg/hr Intravenous Continuous Graciela Street, A.P.N.   Stopped at 11/21/18 1315   • nitroglycerin 50 mg in D5W 250 ml infusion  0-100 mcg/min Intravenous Continuous Graciela Street, A.P.N.   Stopped at 11/21/18 1315   • oxyCODONE immediate-release (ROXICODONE) tablet 5 mg  5 mg Oral Q3HRS PRN Graciela Street, A.P.N.       • oxyCODONE immediate release (ROXICODONE) tablet 10 mg  10 mg Oral Q3HRS PRN Graciela BUTLER. Jad, A.P.N.       • midazolam (VERSED) 2 MG/2ML injection 2 mg  2 mg Intravenous Q HOUR PRN Graciela CARRIE. Jad, A.P.N.       • dexmedetomidine (PRECEDEX) 400 mcg/100mL NS premix infusion  0-1.5 mcg/kg/hr Intravenous Continuous Graciela CARRIE. Jad, A.P.N. 12.9 mL/hr at 11/21/18 1315 0.5 mcg/kg/hr at 11/21/18 1315   • sodium bicarbonate 8.4 % injection 50 mEq  50 mEq Intravenous Q HOUR PRN Graciela CARRIE. Jad, A.P.N.       • morphine (pf) 10 mg/ml 10 MG/ML  injection 4 mg  4 mg Intravenous Q HOUR PRN Graciela Street, A.P.N.   2 mg at 11/21/18 1411   • ondansetron (ZOFRAN) syringe/vial injection 4 mg  4 mg Intravenous Q6HRS PRN Graciela Duttont, A.P.N.        Or   • prochlorperazine (COMPAZINE) injection 10 mg  10 mg Intravenous Q6HRS PRN Graciela Duttont, A.P.N.        Or   • promethazine (PHENERGAN) suppository 25 mg  25 mg Rectal Q6HRS PRN Graciela Duttont, A.P.N.       • acetaminophen (TYLENOL) tablet 650 mg  650 mg Oral Q4HRS PRN Graciela Duttont, A.P.N.        Or   • acetaminophen (TYLENOL) suppository 650 mg  650 mg Rectal Q4HRS PRN Graciela Duttont, A.P.N.       • mag hydrox-al hydrox-simeth (MAALOX PLUS ES or MYLANTA DS) suspension 30 mL  30 mL Oral Q4HRS PRN Graciela Duttont, A.P.N.       • diphenhydrAMINE (BENADRYL) tablet/capsule 25 mg  25 mg Oral HS PRN - MR X 1 Graciela Duttont, A.P.N.       • amiodarone (CORDARONE) tablet 400 mg  400 mg Oral TWICE DAILY Abraham Greenfield M.D.       • EPINEPHrine 4 mg in  mL Infusion  0-0.2 mcg/kg/min Intravenous Continuous Graciela Street, A.P.N. 7.7 mL/hr at 11/21/18 1425 0.02 mcg/kg/min at 11/21/18 1425   • insulin regular human (HUMULIN/NOVOLIN R) 62.5 Units in  mL infusion per protocol  1-6 Units/hr Intravenous Continuous Graciela Duttont, A.P.N. 16 mL/hr at 11/21/18 1425 4 Units/hr at 11/21/18 1425    And   • insulin regular (HUMULIN R) injection 0-14 Units  0-14 Units Intravenous Once Graciela Duttont, A.P.N.   Stopped at 11/21/18 1345    And   • insulin regular (HUMULIN R) injection 0-10 Units  0-10 Units Intravenous PRN Graciela Street, A.P.N.        And   • dextrose 50% (D50W) injection 25 mL  25 mL Intravenous PRN Graciela Street, A.P.N.       • insulin lispro (HUMALOG) injection 0-20 Units  0-20 Units Subcutaneous PRN Graciela Street, A.P.N.       • aspirin EC (ECOTRIN) tablet 81 mg  81 mg Oral DAILY Sd Ty M.D.   Stopped at 11/21/18 0600   • rosuvastatin (CRESTOR) tablet 20 mg  20  mg Oral Q EVENING Sd Ty M.D.   20 mg at 11/20/18 1742   • lidocaine (XYLOCAINE) 1 % injection 0.5 mL  0.5 mL Intradermal Once PRN Alexia Holland A.P.N.       • vancomycin 1500 mg/250mL NS IVPB premix  1,500 mg Intravenous Once JANINE Miranda.P.N.       • ALPRAZolam (XANAX) tablet 0.25 mg  0.25 mg Oral Q6HRS PRN JANINE Miranda.P.N.   0.25 mg at 11/20/18 2225   • aminocaproic acid (AMICAR) 10 g in  mL IV Bolus  10 g Intravenous Once JANINE Miranda.P.N.       • aminocaproic acid (AMICAR) 5 g in  mL Infusion  2 g/hr Intravenous Once JANINE Miranda.P.N.           Allergies  No Known Allergies    Vital Signs last 24 hours  Temp:  [36.5 °C (97.7 °F)-36.9 °C (98.5 °F)] 36.5 °C (97.7 °F)  Pulse:  [51-75] 62  Resp:  [11-27] 18  BP: (125-162)/() 162/115    Physical Exam  Physical Exam   Constitutional: He appears well-developed and well-nourished.   Morbidly obese, bull neck, intubated   HENT:   Head: Normocephalic and atraumatic.   Eyes: Pupils are equal, round, and reactive to light. Conjunctivae are normal.   Neck: Normal range of motion. Neck supple.   Cardiovascular: Normal rate and regular rhythm.    Pulmonary/Chest: Effort normal and breath sounds normal.   ON SBT looks comfortable   Abdominal: Soft. Bowel sounds are normal.   Musculoskeletal: Normal range of motion.   Neurological: He is alert.   Skin: Skin is warm and dry.       Fluids    Intake/Output Summary (Last 24 hours) at 11/21/18 1703  Last data filed at 11/21/18 1302   Gross per 24 hour   Intake           3677.2 ml   Output             2350 ml   Net           1327.2 ml       Laboratory  Recent Results (from the past 48 hour(s))   Hemoglobin A1c    Collection Time: 11/19/18  8:00 PM   Result Value Ref Range    Glycohemoglobin 6.5 (H) 0.0 - 5.6 %    Est Avg Glucose 140 mg/dL   PTT    Collection Time: 11/19/18  8:00 PM   Result Value Ref Range    APTT 59.7 (H) 24.7 - 36.0 sec   PT/INR    Collection Time: 11/19/18   8:00 PM   Result Value Ref Range    PT 13.6 12.0 - 14.6 sec    INR 1.03 0.87 - 1.13   TROPONIN    Collection Time: 11/19/18  8:00 PM   Result Value Ref Range    Troponin I 0.52 (H) 0.00 - 0.04 ng/mL   Basic Metabolic Panel (BMP)    Collection Time: 11/20/18  2:01 AM   Result Value Ref Range    Sodium 136 135 - 145 mmol/L    Potassium 4.0 3.6 - 5.5 mmol/L    Chloride 104 96 - 112 mmol/L    Co2 20 20 - 33 mmol/L    Glucose 95 65 - 99 mg/dL    Bun 21 8 - 22 mg/dL    Creatinine 1.15 0.50 - 1.40 mg/dL    Calcium 9.2 8.5 - 10.5 mg/dL    Anion Gap 12.0 (H) 0.0 - 11.9   CBC with Differential    Collection Time: 11/20/18  2:01 AM   Result Value Ref Range    WBC 8.4 4.8 - 10.8 K/uL    RBC 4.95 4.70 - 6.10 M/uL    Hemoglobin 17.0 14.0 - 18.0 g/dL    Hematocrit 48.8 42.0 - 52.0 %    MCV 98.6 (H) 81.4 - 97.8 fL    MCH 34.3 (H) 27.0 - 33.0 pg    MCHC 34.8 33.7 - 35.3 g/dL    RDW 52.4 (H) 35.9 - 50.0 fL    Platelet Count 358 164 - 446 K/uL    MPV 9.0 9.0 - 12.9 fL    Neutrophils-Polys 48.70 44.00 - 72.00 %    Lymphocytes 31.00 22.00 - 41.00 %    Monocytes 15.80 (H) 0.00 - 13.40 %    Eosinophils 3.80 0.00 - 6.90 %    Basophils 0.60 0.00 - 1.80 %    Immature Granulocytes 0.10 0.00 - 0.90 %    Nucleated RBC 0.00 /100 WBC    Neutrophils (Absolute) 4.08 1.82 - 7.42 K/uL    Lymphs (Absolute) 2.60 1.00 - 4.80 K/uL    Monos (Absolute) 1.32 (H) 0.00 - 0.85 K/uL    Eos (Absolute) 0.32 0.00 - 0.51 K/uL    Baso (Absolute) 0.05 0.00 - 0.12 K/uL    Immature Granulocytes (abs) 0.01 0.00 - 0.11 K/uL    NRBC (Absolute) 0.00 K/uL   TROPONIN    Collection Time: 11/20/18  2:01 AM   Result Value Ref Range    Troponin I 0.50 (H) 0.00 - 0.04 ng/mL   Lipid Profile    Collection Time: 11/20/18  2:01 AM   Result Value Ref Range    Cholesterol,Tot 209 (H) 100 - 199 mg/dL    Triglycerides 257 (H) 0 - 149 mg/dL    HDL 35 (A) >=40 mg/dL     (H) <100 mg/dL   TROPONIN    Collection Time: 11/20/18  2:01 AM   Result Value Ref Range    Troponin I 0.66 (H)  0.00 - 0.04 ng/mL   ESTIMATED GFR    Collection Time: 11/20/18  2:01 AM   Result Value Ref Range    GFR If African American >60 >60 mL/min/1.73 m 2    GFR If Non African American >60 >60 mL/min/1.73 m 2   ABO AND RH CONFIRMATION    Collection Time: 11/20/18  2:01 AM   Result Value Ref Range    ABO Confirm B     Second Rh Group POS    PT/INR    Collection Time: 11/20/18  4:10 AM   Result Value Ref Range    PT 13.4 12.0 - 14.6 sec    INR 1.01 0.87 - 1.13   PTT    Collection Time: 11/20/18  4:10 AM   Result Value Ref Range    APTT 44.4 (H) 24.7 - 36.0 sec   TROPONIN    Collection Time: 11/20/18  5:52 AM   Result Value Ref Range    Troponin I 0.59 (H) 0.00 - 0.04 ng/mL   COD (Adult)    Collection Time: 11/20/18  9:32 AM   Result Value Ref Range    ABO Grouping Only B     Rh Grouping Only POS     Antibody Screen-Cod NEG    TROPONIN    Collection Time: 11/20/18  9:37 AM   Result Value Ref Range    Troponin I 0.40 (H) 0.00 - 0.04 ng/mL   Complete Metabolic Panel (CMP)    Collection Time: 11/20/18  9:37 AM   Result Value Ref Range    Sodium 137 135 - 145 mmol/L    Potassium 4.1 3.6 - 5.5 mmol/L    Chloride 102 96 - 112 mmol/L    Co2 23 20 - 33 mmol/L    Anion Gap 12.0 (H) 0.0 - 11.9    Glucose 115 (H) 65 - 99 mg/dL    Bun 18 8 - 22 mg/dL    Creatinine 1.13 0.50 - 1.40 mg/dL    Calcium 9.7 8.5 - 10.5 mg/dL    AST(SGOT) 20 12 - 45 U/L    ALT(SGPT) 20 2 - 50 U/L    Alkaline Phosphatase 103 (H) 30 - 99 U/L    Total Bilirubin 1.7 (H) 0.1 - 1.5 mg/dL    Albumin 4.5 3.2 - 4.9 g/dL    Total Protein 7.6 6.0 - 8.2 g/dL    Globulin 3.1 1.9 - 3.5 g/dL    A-G Ratio 1.5 g/dL   CBC without Differential    Collection Time: 11/20/18  9:37 AM   Result Value Ref Range    WBC 10.4 4.8 - 10.8 K/uL    RBC 5.42 4.70 - 6.10 M/uL    Hemoglobin 18.4 (H) 14.0 - 18.0 g/dL    Hematocrit 52.8 (H) 42.0 - 52.0 %    MCV 97.4 81.4 - 97.8 fL    MCH 33.9 (H) 27.0 - 33.0 pg    MCHC 34.8 33.7 - 35.3 g/dL    RDW 51.8 (H) 35.9 - 50.0 fL    Platelet Count 383 164  - 446 K/uL    MPV 8.5 (L) 9.0 - 12.9 fL   INR (Prothrombin/ INR)    Collection Time: 18  9:37 AM   Result Value Ref Range    PT 13.1 12.0 - 14.6 sec    INR 0.99 0.87 - 1.13   APTT    Collection Time: 18  9:37 AM   Result Value Ref Range    APTT 93.4 (H) 24.7 - 36.0 sec   ESTIMATED GFR    Collection Time: 18  9:37 AM   Result Value Ref Range    GFR If African American >60 >60 mL/min/1.73 m 2    GFR If Non African American >60 >60 mL/min/1.73 m 2   APTT    Collection Time: 18 11:42 AM   Result Value Ref Range    APTT 56.4 (H) 24.7 - 36.0 sec   EC-ECHOCARDIOGRAM COMPLETE W/ CONT    Collection Time: 18  4:22 PM   Result Value Ref Range    Eject.Frac. MOD BP 51.61     Eject.Frac. MOD 4C 55.56     Eject.Frac. MOD 2C 50.34     Left Ventrical Ejection Fraction 60    TROPONIN    Collection Time: 18  5:14 PM   Result Value Ref Range    Troponin I 0.37 (H) 0.00 - 0.04 ng/mL   APTT    Collection Time: 18  5:14 PM   Result Value Ref Range    APTT 70.4 (H) 24.7 - 36.0 sec   EKG    Collection Time: 18  9:02 PM   Result Value Ref Range    Report       [facility]    Test Date:  2018  Pt Name:    TANISHA WILKINSON                 Department: 161  MRN:        [id number]                      Room:       [facility]  Gender:     Male                         Technician: DGG  :        [id number]                   Requested By:MARIELENA MARTINEZ  Order #:    [id number]                    Reading MD: Wilder Cazares MD    Measurements  Intervals                                Axis  Rate:       59                           P:          39  MD:         248                          QRS:        73  QRSD:       128                          T:          54  QT:         440  QTc:        436    Interpretive Statements  SINUS BRADYCARDIA  FIRST DEGREE AV BLOCK  RIGHT BUNDLE BRANCH BLOCK  LOW VOLTAGE  Compared to ECG 2018 19:50:55  Sinus rhythm no longer present  Left posterior fascicular block no  longer present  Myocardial infarct finding no longer present    Electronically Signed On 11- 23:26:42 PST by Wilder Cazares MD     Comp Metabolic Panel (CMP)    Collection Time: 11/20/18 10:46 PM   Result Value Ref Range    Sodium 138 135 - 145 mmol/L    Potassium 4.2 3.6 - 5.5 mmol/L    Chloride 107 96 - 112 mmol/L    Co2 22 20 - 33 mmol/L    Anion Gap 9.0 0.0 - 11.9    Glucose 102 (H) 65 - 99 mg/dL    Bun 18 8 - 22 mg/dL    Creatinine 1.15 0.50 - 1.40 mg/dL    Calcium 9.3 8.5 - 10.5 mg/dL    AST(SGOT) 18 12 - 45 U/L    ALT(SGPT) 17 2 - 50 U/L    Alkaline Phosphatase 97 30 - 99 U/L    Total Bilirubin 0.9 0.1 - 1.5 mg/dL    Albumin 4.1 3.2 - 4.9 g/dL    Total Protein 7.1 6.0 - 8.2 g/dL    Globulin 3.0 1.9 - 3.5 g/dL    A-G Ratio 1.4 g/dL   CBC with Differential    Collection Time: 11/20/18 10:46 PM   Result Value Ref Range    WBC 11.7 (H) 4.8 - 10.8 K/uL    RBC 5.11 4.70 - 6.10 M/uL    Hemoglobin 17.5 14.0 - 18.0 g/dL    Hematocrit 50.0 42.0 - 52.0 %    MCV 97.8 81.4 - 97.8 fL    MCH 34.2 (H) 27.0 - 33.0 pg    MCHC 35.0 33.7 - 35.3 g/dL    RDW 52.2 (H) 35.9 - 50.0 fL    Platelet Count 378 164 - 446 K/uL    MPV 8.9 (L) 9.0 - 12.9 fL    Neutrophils-Polys 62.60 44.00 - 72.00 %    Lymphocytes 21.00 (L) 22.00 - 41.00 %    Monocytes 11.50 0.00 - 13.40 %    Eosinophils 3.80 0.00 - 6.90 %    Basophils 0.80 0.00 - 1.80 %    Immature Granulocytes 0.30 0.00 - 0.90 %    Nucleated RBC 0.00 /100 WBC    Neutrophils (Absolute) 7.33 1.82 - 7.42 K/uL    Lymphs (Absolute) 2.46 1.00 - 4.80 K/uL    Monos (Absolute) 1.35 (H) 0.00 - 0.85 K/uL    Eos (Absolute) 0.45 0.00 - 0.51 K/uL    Baso (Absolute) 0.09 0.00 - 0.12 K/uL    Immature Granulocytes (abs) 0.03 0.00 - 0.11 K/uL    NRBC (Absolute) 0.00 K/uL   TROPONIN    Collection Time: 11/20/18 10:46 PM   Result Value Ref Range    Troponin I 0.33 (H) 0.00 - 0.04 ng/mL   ESTIMATED GFR    Collection Time: 11/20/18 10:46 PM   Result Value Ref Range    GFR If  >60 >60  mL/min/1.73 m 2    GFR If Non African American >60 >60 mL/min/1.73 m 2   APTT    Collection Time: 11/21/18 12:36 AM   Result Value Ref Range    APTT 39.1 (H) 24.7 - 36.0 sec   Urinalysis    Collection Time: 11/21/18  2:06 AM   Result Value Ref Range    Color Yellow     Character Clear     Specific Gravity 1.019 <1.035    Ph 5.5 5.0 - 8.0    Glucose Negative Negative mg/dL    Ketones Negative Negative mg/dL    Protein Negative Negative mg/dL    Bilirubin Negative Negative    Urobilinogen, Urine 0.2 Negative    Nitrite Negative Negative    Leukocyte Esterase Negative Negative    Occult Blood Negative Negative    Micro Urine Req see below    ACCU-CHEK GLUCOSE    Collection Time: 11/21/18  5:10 AM   Result Value Ref Range    Glucose - Accu-Ck 117 (H) 65 - 99 mg/dL   ISTAT ARTERIAL BLOOD GAS    Collection Time: 11/21/18  7:50 AM   Result Value Ref Range    Ph 7.422 7.400 - 7.500    Pco2 31.5 26.0 - 37.0 mmHg    Po2 64 64 - 87 mmHg    Tco2 22 20 - 33 mmol/L    S02 93 93 - 99 %    Hco3 20.6 17.0 - 25.0 mmol/L    BE -3 -4 - 3 mmol/L    Body Temp 37.0 C degrees    O2 Therapy 50 %    iPF Ratio 128     Ph Temp Aileen 7.422 7.400 - 7.500    Pco2 Temp Co 31.5 26.0 - 37.0 mmHg    Po2 Temp Cor 64 64 - 87 mmHg    Specimen Arterial     Action Range Triggered NO     Inst. Qualified Patient YES    ISTAT HEMATOCRIT AND HEMOGLOBIN    Collection Time: 11/21/18  7:50 AM   Result Value Ref Range    Istat Hematocrit 48 42 - 52 %    Istat Hemoglobin 16.3 14.0 - 18.0 g/dL   POC ACT (resulted by nursing)    Collection Time: 11/21/18  7:53 AM   Result Value Ref Range    Istat Activated Clotting Time 81 74 - 137 sec   POC ACT (resulted by nursing)    Collection Time: 11/21/18  9:00 AM   Result Value Ref Range    Istat Activated Clotting Time 109 74 - 137 sec   POC ACT (resulted by nursing)    Collection Time: 11/21/18  9:26 AM   Result Value Ref Range    Istat Activated Clotting Time 428 (H) 74 - 137 sec   ISTAT ARTERIAL BLOOD GAS    Collection  Time: 11/21/18  9:27 AM   Result Value Ref Range    Ph 7.356 (L) 7.400 - 7.500    Pco2 40.1 (H) 26.0 - 37.0 mmHg    Po2 103 (H) 64 - 87 mmHg    Tco2 24 20 - 33 mmol/L    S02 98 93 - 99 %    Hco3 22.4 17.0 - 25.0 mmol/L    BE -3 -4 - 3 mmol/L    Body Temp 37.0 C degrees    O2 Therapy 100 %    iPF Ratio 103     Ph Temp Aileen 7.356 (L) 7.400 - 7.500    Pco2 Temp Co 40.1 (H) 26.0 - 37.0 mmHg    Po2 Temp Cor 103 (H) 64 - 87 mmHg    Specimen Arterial     Action Range Triggered NO     Inst. Qualified Patient YES    ISTAT GLUCOSE    Collection Time: 11/21/18  9:27 AM   Result Value Ref Range    Istat Glucose 131 (H) 65 - 99 mg/dL   ISTAT SODIUM    Collection Time: 11/21/18  9:27 AM   Result Value Ref Range    Istat Sodium 140 135 - 145 mmol/L   ISTAT POTASSIUM    Collection Time: 11/21/18  9:27 AM   Result Value Ref Range    Istat Potassium 3.8 3.6 - 5.5 mmol/L   ISTAT IONIZED CA    Collection Time: 11/21/18  9:27 AM   Result Value Ref Range    Istat Ionized Calcium 1.10 1.10 - 1.30 mmol/L   ISTAT HEMATOCRIT AND HEMOGLOBIN    Collection Time: 11/21/18  9:27 AM   Result Value Ref Range    Istat Hematocrit 42 42 - 52 %    Istat Hemoglobin 14.3 14.0 - 18.0 g/dL   POC ACT (resulted by nursing)    Collection Time: 11/21/18  9:56 AM   Result Value Ref Range    Istat Activated Clotting Time 455 (H) 74 - 137 sec   POC ACT (resulted by nursing)    Collection Time: 11/21/18 10:15 AM   Result Value Ref Range    Istat Activated Clotting Time 461 (H) 74 - 137 sec   ISTAT VENOUS BLOOD GAS    Collection Time: 11/21/18 10:16 AM   Result Value Ref Range    Ph 7.374 7.310 - 7.450    Pco2 41.8 41.0 - 51.0 mmHg    Po2 53 (H) 25 - 40 mmHg    Tco2 26 20 - 33 mmol/L    SO2 86 %    Hco3 24.4 24.0 - 28.0 mmol/L    BE -1 -4 - 3 mmol/L    Body Temp 37.0 C degrees    O2 Therapy 100 %    iPF Ratio 53     Ph Temp Correc 7.374 7.310 - 7.450    Pco2 Temp Aileen 41.8 41.0 - 51.0 mmHg    Po2 Temp Corre 53 (H) 25 - 40 mmHg    Specimen Venous     Action Range  Triggered NO     Inst. Qualified Patient YES    ISTAT GLUCOSE    Collection Time: 11/21/18 10:16 AM   Result Value Ref Range    Istat Glucose 143 (H) 65 - 99 mg/dL   ISTAT SODIUM    Collection Time: 11/21/18 10:16 AM   Result Value Ref Range    Istat Sodium 136 135 - 145 mmol/L   ISTAT POTASSIUM    Collection Time: 11/21/18 10:16 AM   Result Value Ref Range    Istat Potassium 4.4 3.6 - 5.5 mmol/L   ISTAT IONIZED CA    Collection Time: 11/21/18 10:16 AM   Result Value Ref Range    Istat Ionized Calcium 1.02 (L) 1.10 - 1.30 mmol/L   ISTAT HEMATOCRIT AND HEMOGLOBIN    Collection Time: 11/21/18 10:16 AM   Result Value Ref Range    Istat Hematocrit 33 (L) 42 - 52 %    Istat Hemoglobin 11.2 (L) 14.0 - 18.0 g/dL   ISTAT ARTERIAL BLOOD GAS    Collection Time: 11/21/18 10:22 AM   Result Value Ref Range    Ph 7.416 7.400 - 7.500    Pco2 40.1 (H) 26.0 - 37.0 mmHg    Po2 331 (H) 64 - 87 mmHg    Tco2 27 20 - 33 mmol/L    S02 100 (H) 93 - 99 %    Hco3 25.8 (H) 17.0 - 25.0 mmol/L    BE 1 -4 - 3 mmol/L    Body Temp 37.0 C degrees    O2 Therapy 100 %    iPF Ratio 331     Ph Temp Aileen 7.416 7.400 - 7.500    Pco2 Temp Co 40.1 (H) 26.0 - 37.0 mmHg    Po2 Temp Cor 331 (H) 64 - 87 mmHg    Specimen Arterial     Action Range Triggered NO     Inst. Qualified Patient YES    [facility] GLUCOSE    Collection Time: 11/21/18 10:22 AM   Result Value Ref Range    Istat Glucose 145 (H) 65 - 99 mg/dL   [facility] SODIUM    Collection Time: 11/21/18 10:22 AM   Result Value Ref Range    Istat Sodium 136 135 - 145 mmol/L   [facility] POTASSIUM    Collection Time: 11/21/18 10:22 AM   Result Value Ref Range    Istat Potassium 4.5 3.6 - 5.5 mmol/L   [facility] IONIZED CA    Collection Time: 11/21/18 10:22 AM   Result Value Ref Range    Istat Ionized Calcium 1.01 (L) 1.10 - 1.30 mmol/L   [facility] HEMATOCRIT AND HEMOGLOBIN    Collection Time: 11/21/18 10:22 AM   Result Value Ref Range    Istat Hematocrit 34 (L) 42 - 52 %    Istat Hemoglobin 11.6 (L) 14.0 - 18.0 g/dL   POC ACT  (resulted by nursing)    Collection Time: 11/21/18 10:28 AM   Result Value Ref Range    Istat Activated Clotting Time 472 (H) 74 - 137 sec   POC ACT (resulted by nursing)    Collection Time: 11/21/18 10:46 AM   Result Value Ref Range    Istat Activated Clotting Time 538 (H) 74 - 137 sec   [facility] ARTERIAL BLOOD GAS    Collection Time: 11/21/18 10:47 AM   Result Value Ref Range    Action Range Triggered NO     Inst. Qualified Patient YES    [facility] SODIUM    Collection Time: 11/21/18 10:47 AM   Result Value Ref Range    Istat Sodium 136 135 - 145 mmol/L   [facility] HEMATOCRIT AND HEMOGLOBIN    Collection Time: 11/21/18 10:47 AM   Result Value Ref Range    Istat Hematocrit 35 (L) 42 - 52 %    Istat Hemoglobin 11.9 (L) 14.0 - 18.0 g/dL   POC ACT (resulted by nursing)    Collection Time: 11/21/18 11:15 AM   Result Value Ref Range    Istat Activated Clotting Time 555 (H) 74 - 137 sec   ISTAT ARTERIAL BLOOD GAS    Collection Time: 11/21/18 11:15 AM   Result Value Ref Range    Ph 7.394 (L) 7.400 - 7.500    Pco2 39.1 (H) 26.0 - 37.0 mmHg    Po2 221 (H) 64 - 87 mmHg    Tco2 25 20 - 33 mmol/L    S02 100 (H) 93 - 99 %    Hco3 23.9 17.0 - 25.0 mmol/L    BE -1 -4 - 3 mmol/L    Body Temp 37.0 C degrees    O2 Therapy 80 %    iPF Ratio 276     Ph Temp Aileen 7.394 (L) 7.400 - 7.500    Pco2 Temp Co 39.1 (H) 26.0 - 37.0 mmHg    Po2 Temp Cor 221 (H) 64 - 87 mmHg    Specimen Arterial     Action Range Triggered NO     Inst. Qualified Patient YES    ISTAT GLUCOSE    Collection Time: 11/21/18 11:15 AM   Result Value Ref Range    Istat Glucose 164 (H) 65 - 99 mg/dL   ISTAT SODIUM    Collection Time: 11/21/18 11:15 AM   Result Value Ref Range    Istat Sodium 133 (L) 135 - 145 mmol/L   ISTAT POTASSIUM    Collection Time: 11/21/18 11:15 AM   Result Value Ref Range    Istat Potassium 4.9 3.6 - 5.5 mmol/L   ISTAT IONIZED CA    Collection Time: 11/21/18 11:15 AM   Result Value Ref Range    Istat Ionized Calcium 1.02 (L) 1.10 - 1.30 mmol/L   ISTAT  HEMATOCRIT AND HEMOGLOBIN    Collection Time: 11/21/18 11:15 AM   Result Value Ref Range    Istat Hematocrit 36 (L) 42 - 52 %    Istat Hemoglobin 12.2 (L) 14.0 - 18.0 g/dL   POC ACT (resulted by nursing)    Collection Time: 11/21/18 11:38 AM   Result Value Ref Range    Istat Activated Clotting Time 532 (H) 74 - 137 sec   ISTAT ARTERIAL BLOOD GAS    Collection Time: 11/21/18 11:43 AM   Result Value Ref Range    Ph 7.364 (L) 7.400 - 7.500    Pco2 39.3 (H) 26.0 - 37.0 mmHg    Po2 214 (H) 64 - 87 mmHg    Tco2 24 20 - 33 mmol/L    S02 100 (H) 93 - 99 %    Hco3 22.4 17.0 - 25.0 mmol/L    BE -3 -4 - 3 mmol/L    Body Temp 37.0 C degrees    O2 Therapy 80 %    iPF Ratio 268     Ph Temp Aileen 7.364 (L) 7.400 - 7.500    Pco2 Temp Co 39.3 (H) 26.0 - 37.0 mmHg    Po2 Temp Cor 214 (H) 64 - 87 mmHg    Specimen Arterial     Action Range Triggered NO     Inst. Qualified Patient YES    ISTAT GLUCOSE    Collection Time: 11/21/18 11:43 AM   Result Value Ref Range    Istat Glucose 149 (H) 65 - 99 mg/dL   ISTAT SODIUM    Collection Time: 11/21/18 11:43 AM   Result Value Ref Range    Istat Sodium 136 135 - 145 mmol/L   ISTAT POTASSIUM    Collection Time: 11/21/18 11:43 AM   Result Value Ref Range    Istat Potassium 4.6 3.6 - 5.5 mmol/L   ISTAT IONIZED CA    Collection Time: 11/21/18 11:43 AM   Result Value Ref Range    Istat Ionized Calcium 0.99 (L) 1.10 - 1.30 mmol/L   ISTAT HEMATOCRIT AND HEMOGLOBIN    Collection Time: 11/21/18 11:43 AM   Result Value Ref Range    Istat Hematocrit 35 (L) 42 - 52 %    Istat Hemoglobin 11.9 (L) 14.0 - 18.0 g/dL   POC ACT (resulted by nursing)    Collection Time: 11/21/18 12:17 PM   Result Value Ref Range    Istat Activated Clotting Time 103 74 - 137 sec   ISTAT ARTERIAL BLOOD GAS    Collection Time: 11/21/18 12:18 PM   Result Value Ref Range    Ph 7.369 (L) 7.400 - 7.500    Pco2 36.3 26.0 - 37.0 mmHg    Po2 116 (H) 64 - 87 mmHg    Tco2 22 20 - 33 mmol/L    S02 98 93 - 99 %    Hco3 21.0 17.0 - 25.0 mmol/L     BE -4 -4 - 3 mmol/L    Body Temp 37.0 C degrees    O2 Therapy 100 %    iPF Ratio 116     Ph Temp Aileen 7.369 (L) 7.400 - 7.500    Pco2 Temp Co 36.3 26.0 - 37.0 mmHg    Po2 Temp Cor 116 (H) 64 - 87 mmHg    Specimen Arterial     Action Range Triggered NO     Inst. Qualified Patient YES    ISTAT GLUCOSE    Collection Time: 11/21/18 12:18 PM   Result Value Ref Range    Istat Glucose 143 (H) 65 - 99 mg/dL   ISTAT SODIUM    Collection Time: 11/21/18 12:18 PM   Result Value Ref Range    Istat Sodium 140 135 - 145 mmol/L   ISTAT POTASSIUM    Collection Time: 11/21/18 12:18 PM   Result Value Ref Range    Istat Potassium 3.6 3.6 - 5.5 mmol/L   ISTAT IONIZED CA    Collection Time: 11/21/18 12:18 PM   Result Value Ref Range    Istat Ionized Calcium 1.37 (H) 1.10 - 1.30 mmol/L   ISTAT HEMATOCRIT AND HEMOGLOBIN    Collection Time: 11/21/18 12:18 PM   Result Value Ref Range    Istat Hematocrit 40 (L) 42 - 52 %    Istat Hemoglobin 13.6 (L) 14.0 - 18.0 g/dL   Platelet count    Collection Time: 11/21/18  1:00 PM   Result Value Ref Range    Platelet Count 264 164 - 446 K/uL   Magnesium    Collection Time: 11/21/18  1:00 PM   Result Value Ref Range    Magnesium 2.2 1.5 - 2.5 mg/dL   Prothrombin time (INR)    Collection Time: 11/21/18  1:00 PM   Result Value Ref Range    PT 15.6 (H) 12.0 - 14.6 sec    INR 1.23 (H) 0.87 - 1.13   APTT (PTT)    Collection Time: 11/21/18  1:00 PM   Result Value Ref Range    APTT 24.5 (L) 24.7 - 36.0 sec   TROPONIN    Collection Time: 11/21/18  1:00 PM   Result Value Ref Range    Troponin I 1.59 (H) 0.00 - 0.04 ng/mL   ACCU-CHEK GLUCOSE    Collection Time: 11/21/18  1:01 PM   Result Value Ref Range    Glucose - Accu-Ck 135 (H) 65 - 99 mg/dL   ISTAT ARTERIAL BLOOD GAS    Collection Time: 11/21/18  1:03 PM   Result Value Ref Range    Ph 7.363 (L) 7.400 - 7.500    Pco2 37.0 26.0 - 37.0 mmHg    Po2 67 64 - 87 mmHg    Tco2 22 20 - 33 mmol/L    S02 92 (L) 93 - 99 %    Hco3 21.1 17.0 - 25.0 mmol/L    BE -4 -4 - 3  mmol/L    Body Temp 36.4 C degrees    O2 Therapy 60 %    iPF Ratio 112     Ph Temp Aileen 7.372 (L) 7.400 - 7.500    Pco2 Temp Co 36.1 26.0 - 37.0 mmHg    Po2 Temp Cor 64 64 - 87 mmHg    Specimen Arterial     Action Range Triggered NO     Inst. Qualified Patient YES    ISTAT SODIUM    Collection Time: 18  1:03 PM   Result Value Ref Range    Istat Sodium 139 135 - 145 mmol/L   ISTAT POTASSIUM    Collection Time: 18  1:03 PM   Result Value Ref Range    Istat Potassium 3.6 3.6 - 5.5 mmol/L   ISTAT IONIZED CA    Collection Time: 18  1:03 PM   Result Value Ref Range    Istat Ionized Calcium 1.21 1.10 - 1.30 mmol/L   ISTAT HEMATOCRIT AND HEMOGLOBIN    Collection Time: 18  1:03 PM   Result Value Ref Range    Istat Hematocrit 44 42 - 52 %    Istat Hemoglobin 15.0 14.0 - 18.0 g/dL   EKG on arrival to [facility]    Collection Time: 18  1:16 PM   Result Value Ref Range    Report       [facility]    Test Date:  2018  Pt Name:    TANISHA WILKINSON                 Department: 161  MRN:        [id number]                      Room:       T627  Gender:     Male                         Technician[facility]  :        [id number]                   Requested By:KRYSTAL RODARTE  Order #:    [id number]                    Reading MD:    Measurements  Intervals                                Axis  Rate:       71                           P:          39  IN:         200                          QRS:        63  QRSD:       126                          T:          17  QT:         456  QTc:        496    Interpretive Statements  SINUS RHYTHM  RIGHT BUNDLE BRANCH BLOCK  LATERAL INJURY, PROBABLE EARLY ACUTE INFARCT  Compared to ECG 2018 21:02:20  Myocardial infarct finding now present  Sinus bradycardia no longer present  First degree AV block no longer present     ISTAT ARTERIAL BLOOD GAS    Collection Time: 18  1:55 PM   Result Value Ref Range    Ph 7.375 (L) 7.400 - 7.500    Pco2 32.7 26.0 - 37.0 mmHg    Po2  66 64 - 87 mmHg    Tco2 20 20 - 33 mmol/L    S02 93 93 - 99 %    Hco3 19.2 17.0 - 25.0 mmol/L    BE -5 (L) -4 - 3 mmol/L    Body Temp 36.2 C degrees    O2 Therapy 60 %    iPF Ratio 110     Ph Temp Aileen 7.387 (L) 7.400 - 7.500    Pco2 Temp Co 31.6 26.0 - 37.0 mmHg    Po2 Temp Cor 63 (L) 64 - 87 mmHg    Specimen Arterial     Action Range Triggered NO     Inst. Qualified Patient YES    HISTOLOGY REQUEST    Collection Time: 11/21/18  2:16 PM   Result Value Ref Range    Pathology Request Sent to [facility]    ACCU-CHEK GLUCOSE    Collection Time: 11/21/18  2:28 PM   Result Value Ref Range    Glucose - Accu-Ck 126 (H) 65 - 99 mg/dL   ISTAT ARTERIAL BLOOD GAS    Collection Time: 11/21/18  2:57 PM   Result Value Ref Range    Ph 7.409 7.400 - 7.500    Pco2 31.9 26.0 - 37.0 mmHg    Po2 67 64 - 87 mmHg    Tco2 21 20 - 33 mmol/L    S02 93 93 - 99 %    Hco3 20.2 17.0 - 25.0 mmol/L    BE -3 -4 - 3 mmol/L    Body Temp 36.7 C degrees    O2 Therapy 60 %    iPF Ratio 112     Ph Temp Aileen 7.414 7.400 - 7.500    Pco2 Temp Co 31.5 26.0 - 37.0 mmHg    Po2 Temp Cor 66 64 - 87 mmHg    Specimen Arterial     Action Range Triggered NO     Inst. Qualified Patient YES    ACCU-CHEK GLUCOSE    Collection Time: 11/21/18  3:31 PM   Result Value Ref Range    Glucose - Accu-Ck 110 (H) 65 - 99 mg/dL       Imaging      Assessment/Plan  No new Assessment & Plan notes have been filed under this hospital service since the last note was generated.  Service: Pulmonary      Discussed patient condition and risk of morbidity and/or mortality with RN, RT, Charge nurse / hot rounds and cardiology.    The patient remains critically ill.  Critical care time = 30 minutes in directly providing and coordinating critical care and extensive data review.  No time overlap and excludes procedures.

## 2018-11-22 NOTE — PROGRESS NOTES
Monitor Summary: Normal Sinus 70s-80s 0.2/0.12/0.36 with very rare mildly perfusing ectopy and profound vasoplegia. Epi drip was slowly titrated up through shift with a max dose of 0.07 mcg/kg.min.    12 hour chart check

## 2018-11-22 NOTE — PROGRESS NOTES
"Pt reports burning sensation on right thoracic cavity. No redness observed. Suggestion to stand and reposition given by RN. Education provided regarding laying on back for several hours during surgery as possible source for all the aches pt feels and emphasis that repositioning and walking will help decrease pain and sensations. Wife at bedside stated \"That's weird. The doctor specifically said he did not want Romario walking at all today.\" Teaching provided regarding minimum 25 foot walk on post op day one and that it is routine to walk and be up in chair today. RN suggested inviting intensivist on floor to bedside to clarify any misunderstandings. No request for MD at this time. Cool packs provided. Plan maintains, 25 foot walk after pt weaned from HFNC.   "

## 2018-11-22 NOTE — CARE PLAN
Problem: Post Op Day 1 CABG/Heart Valve Replacement  Goal: Optimal care of the post op CABG/heart valve replacement Post Op Day 1    Intervention: EKG and CXR completed  Done in AM  Intervention: Daily Weights  Completed  Intervention: Up in chair for all meals  Mobilized to chair in AM  Intervention: Consider chest tube removal by MD  Will assess today  Intervention: IS q 1 hour while awake and record best IS volume  Weak effort  Intervention: Graduated elastic stockings  One leg on

## 2018-11-22 NOTE — RESPIRATORY CARE
Ventilator Weaning Update    Patient is on vent day 1.  SBT was tolerated for a minimum of  (Ongoing.) hours on settings of 5/8.    Wean parameters for this SBT were:  #FVC / Vital Capacity (liters) : 1.4 (11/21/18 1703)  NIF (cm H2O) : -52 (11/21/18 1703)  Rapid Shallow Breathing Index (RR/VT): 23 (11/21/18 1703)  RR (bpm): 18 (11/21/18 1703)  Spontaneous VE: 14.8 (11/21/18 1703)  Spontaneous VT: 644 (11/21/18 1703)    Recommendation: Extubate.    Events/Summary/Plan: Pt extubated. (11/21/18 1703)

## 2018-11-22 NOTE — PROGRESS NOTES
Cardiovascular Surgery Progress Note    Name: Romario Chaudhari  MRN: [id number]  : 1950  Admit Date: 2018  7:44 PM  Procedure:  Procedure(s) and Anesthesia Type:     * MULTIPLE CORONARY ARTERY BYPASS x3, RIGHT LEG ENDOSCOPIC VEIN HARVEST - General     * GOOD - General  1 Day Post-Op    Vitals:  Patient Vitals for the past 8 hrs:   Temp Monitored Temp 2 SpO2 O2 Delivery O2 (LPM) Pulse Heart Rate (Monitored) Resp NIBP Weight FiO2%   18 0700 - 38.2 °C (100.8 °F) 94 % - 60 72 72 18 (!) 98/67 - 80 %   18 0644 - - 96 % - 60 77 78 16 - - 80 %   18 0600 - 38.3 °C (100.9 °F) 95 % - - 71 72 (!) 24 112/69 - -   18 0508 - - 93 % - 60 77 77 20 - - 100 %   18 0500 - 38.2 °C (100.8 °F) 93 % - - 78 77 (!) 21 (!) 87/58 - -   18 0400 (!) 38.1 °C (100.6 °F) 38.1 °C (100.6 °F) 93 % - - 80 80 (!) 22 (!) 98/59 110.7 kg (244 lb 0.8 oz) -   18 0330 - - 94 % Non-Rebreather Mask 15 - - - - - -   18 0314 - - 91 % - 10 81 81 (!) 22 - - -   18 0300 - 38.1 °C (100.6 °F) 92 % - - 85 85 (!) 24 (!) 98/68 - -   18 0200 - 37.9 °C (100.2 °F) 92 % - - 82 85 (!) 22 (!) 91/55 - -   18 0100 - 37.7 °C (99.9 °F) 92 % - - 85 85 (!) 25 115/62 - -     Temp (24hrs), Av.7 °C (99.8 °F), Min:37.3 °C (99.1 °F), Max:38.1 °C (100.6 °F)      Respiratory:  Kaplan Vent Mode: Spont, PEEP/CPAP: 8, FiO2: 40, P Peak (PIP): 14, P MEAN: 9.5 Respiration: 18, Pulse Oximetry: 94 %, O2 Daily Delivery Respiratory : Highflow Nasal Cannula     Chest Tube Drains:          Fluids:    Intake/Output Summary (Last 24 hours) at 18 0806  Last data filed at 18 0600   Gross per 24 hour   Intake          8068.51 ml   Output             5172 ml   Net          2896.51 ml     Admit weight: Weight: 103.9 kg (229 lb 0.9 oz)  Current weight: Weight: 110.7 kg (244 lb 0.8 oz) (18 0400)    Labs:  Recent Labs      18   0937  18   2246  18   1300  18   0515   WBC  10.4  11.7*   --    13.5*   RBC  5.42  5.11   --   4.16*   HEMOGLOBIN  18.4*  17.5   --   14.2   HEMATOCRIT  52.8*  50.0   --   41.1*   MCV  97.4  97.8   --   98.8*   MCH  33.9*  34.2*   --   34.1*   MCHC  34.8  35.0   --   34.5   RDW  51.8*  52.2*   --   51.7*   PLATELETCT  383  378  264  266   MPV  8.5*  8.9*   --   9.1     Recent Labs      11/20/18   0201  11/20/18   2246   NEUTSPOLYS  48.70  62.60   LYMPHOCYTES  31.00  21.00*   MONOCYTES  15.80*  11.50   EOSINOPHILS  3.80  3.80   BASOPHILS  0.60  0.80     Recent Labs      11/20/18   0937  11/20/18   2246  11/21/18   1715  11/21/18   2210  11/22/18   0515   SODIUM  137  138   --    --   138   POTASSIUM  4.1  4.2  4.1  4.3  4.5   CHLORIDE  102  107   --    --   109   CO2  23 22   --    --   21   GLUCOSE  115*  102*   --    --   141*   BUN  18 18   --    --   16   CREATININE  1.13  1.15   --    --   1.05   CALCIUM  9.7  9.3   --    --   8.2*     Recent Labs      11/20/18   0410  11/20/18   0937   11/20/18   1714  11/21/18   0036  11/21/18   1300   APTT  44.4*  93.4*   < >  70.4*  39.1*  24.5*   INR  1.01  0.99   --    --    --   1.23*    < > = values in this interval not displayed.       Medications:  • NS       • insulin NPH  5 Units     • insulin lispro  3 Units     • insulin lispro  0-15 Units     • albumin human 5%  25 g     • Calcium CHLORIDE IVPB  1,000 mg     • gabapentin  100 mg     • K+ Scale: Goal of 4.5  1 Each     • senna-docusate  2 Tab     • enoxaparin  40 mg     • mupirocin  1 Application     • magnesium sulfate  1 g Stopped (11/21/18 1430)   • metoprolol  12.5 mg      Followed by   • [START ON 11/23/2018] metoprolol  25 mg     • clopidogrel  75 mg     • insulin regular  0-14 Units     • aspirin EC  81 mg     • rosuvastatin  20 mg          Ordered Medications:    ASA - Yes    Plavix - Yes    Post-operative Beta Blockers - Yes--held for Hotn currently    Ace Inhibitor - No; contraindicated because of Other normal ef    Statin - Yes          Central Line:  Type of line:  TLC   Date of insertion: 11/21  Date of removal: see rn notes    Exam:   Review of Systems   Constitutional: Negative.    HENT: Negative.    Eyes: Negative.    Cardiovascular: Negative.    Gastrointestinal: Negative.    Genitourinary: Negative.    Musculoskeletal: Positive for back pain, joint pain and myalgias.   Skin: Negative.    Neurological: Negative.    Endo/Heme/Allergies: Negative.    Psychiatric/Behavioral: Negative.        Physical Exam   Constitutional: He is oriented to person, place, and time. He appears well-developed and well-nourished.   obese   HENT:   Head: Normocephalic.   Eyes: Pupils are equal, round, and reactive to light.   Neck: Normal range of motion. No JVD present.   Cardiovascular: Normal rate and regular rhythm.  Exam reveals friction rub.    Pulmonary/Chest: He has decreased breath sounds in the right lower field and the left lower field.   High flow NC   Abdominal: Soft. Bowel sounds are normal. He exhibits no distension. There is no guarding.   Genitourinary:   Genitourinary Comments: syed   Musculoskeletal: Normal range of motion.   Neurological: He is alert and oriented to person, place, and time.   Skin: Skin is warm and dry.   prevena to chest, [facility] site cdi   Psychiatric: He has a normal mood and affect.       Quality Measures:   Quality-Core Measures   Reviewed items::  EKG reviewed, Radiology images reviewed, Labs reviewed and Medications reviewed  Syed catheter::  One or Two Days Post Surgery (Day of Surgery being Day 0)  Central line in place:  Vasopressors  DVT prophylaxis pharmacological::  Enoxaparin (Lovenox)  DVT prophylaxis - mechanical:  SCDs  Ulcer Prophylaxis::  Yes  Assessed for rehabilitation services:  Patient was assess for and/or received rehabilitation services during this hospitalization      Assessment/Plan:  POD 1 Extubated, on HF NC.  CXR unchanged.  On Mod dose epi, CVP 3, +2.6L.  Episodes Hotn with coughing.  CT output min, no airleka.  PLAN:  Albumin  bolus/caCl.  Add low dose bryan, wean down epi.  Discussed recent bronchitis/ABX with pulm--tx deferred to them.  Will start diuresis when BP allows. DC mediastinal CTs    Active Hospital Problems    Diagnosis   • Acute respiratory failure following trauma and surgery ([facility]) [J95.821]     Priority: High     Pt on wean post-op CABG, Evaluated and re-evaluated me along with RT and nursing. He meets weaning parameters, he is awake, alert, moves all 4 extremities, able to lift his head off the bed, minimal secretions, on minimal pressor, good cough and gag.     I will give patient a trial of extubation.      • Essential hypertension [I10]     Priority: High   • NSTEMI (non-ST elevated myocardial infarction) ([facility]) [I21.4]   • Class 1 obesity due to excess calories without serious comorbidity with body mass index (BMI) of 30.0 to 30.9 in adult [E66.09, Z68.30]

## 2018-11-22 NOTE — PROGRESS NOTES
Critical Care Progress Note    Date of admission  11/19/2018    Chief Complaint  68 y.o. male admitted 11/19/2018 with s/p CABG    Hospital Course   Extubated     Interval Problem Update  Reviewed last 24 hour events:  Extubated yesterday afternoon  Placed on high flow oxygen for sagging saturation  On epi, adding bryan for soft Bps  Receiving fluid for hypotension  Small lung volumes by CXR  Urine 750 out over night  Complains that it hurts to take deep breaths    Review of Systems  Review of Systems   HENT: Negative.    Eyes: Negative.    Respiratory: Positive for cough and shortness of breath.    Cardiovascular: Positive for chest pain.        Pleuritic chest pain   Gastrointestinal: Negative.    Genitourinary: Negative.    Musculoskeletal: Negative.    Skin: Negative.    Neurological: Positive for weakness.   Endo/Heme/Allergies: Negative.    Psychiatric/Behavioral: Negative.         Vital Signs for last 24 hours   Temp:  [37.3 °C (99.1 °F)-38.1 °C (100.6 °F)] 38.1 °C (100.6 °F)  Pulse:  [61-85] 72  Resp:  [15-27] 18    Hemodynamic parameters for last 24 hours  CVP:  [3 MM HG-14 MM HG] 4 MM HG    Respiratory  Kaplan Vent Mode: Spont  PEEP/CPAP: 8  FiO2: 40  P Support: 5  P MEAN: 9.5  Control VTE (exp VT): 481    Physical Exam   Physical Exam   Constitutional: He is oriented to person, place, and time. He appears well-developed and well-nourished.   Morbidly obese   HENT:   Head: Normocephalic and atraumatic.   Eyes: Pupils are equal, round, and reactive to light. Conjunctivae are normal.   Neck: Neck supple.   Cardiovascular: Normal rate and regular rhythm.    Pulmonary/Chest: Breath sounds normal.   Modest cough effort   Abdominal: Soft. Bowel sounds are normal.   Musculoskeletal: Normal range of motion.   Neurological: He is alert and oriented to person, place, and time. He has normal reflexes.   Skin: Skin is warm and dry.       Medications  Current Facility-Administered Medications   Medication Dose Route  Frequency Provider Last Rate Last Dose   • SODIUM CHLORIDE 0.9 % IV SOLN            • insulin NPH (HUMULIN,NOVOLIN) injection 5 Units  5 Units Subcutaneous Q8HRS Graciela Duttont, A.P.N.       • insulin lispro (HUMALOG) injection 3 Units  3 Units Subcutaneous TID AC Graciela Duttont, A.P.N.       • insulin lispro (HUMALOG) injection 0-15 Units  0-15 Units Subcutaneous ACHS & 0200 Graciela Duttont, A.P.N.       • phenylephrine (BRIDGETT-SYNEPHRINE) 40,000 mcg in  mL Infusion  0-30 mcg/min Intravenous Continuous Graciela BUTLER. Jad, A.P.N.       • albumin human 5% solution 25 g  25 g Intravenous Once Graciela CARRIE. Jad, A.P.N.       • calcium CHLORIDE 1 g in D5W 100 mL IVPB  1,000 mg Intravenous Once Graciela Duttont, A.P.N.       • gabapentin (NEURONTIN) capsule 100 mg  100 mg Oral BID Graciela Duttont, A.P.N.   100 mg at 11/22/18 0552   • Respiratory Care per Protocol   Nebulization Continuous RT Graciela Duttont, A.P.N.       • NS infusion   Intravenous Continuous Graciela Duttont, A.P.N. 30 mL/hr at 11/22/18 0123     • K+ Scale: Goal of 4.5  1 Each Intravenous Q6HRS Graciela Duttont, A.P.N.   Stopped at 11/22/18 0000   • Pharmacy Consult Request ...Pain Management Review 1 Each  1 Each Other PRN Graciela Duttont, A.P.N.       • senna-docusate (PERICOLACE or SENOKOT S) 8.6-50 MG per tablet 2 Tab  2 Tab Oral BID Graciela ELÍAS Duttont, A.P.N.   2 Tab at 11/22/18 0552    And   • polyethylene glycol/lytes (MIRALAX) PACKET 1 Packet  1 Packet Oral QDAY PRN Graciela Duttont, A.P.N.        And   • magnesium hydroxide (MILK OF MAGNESIA) suspension 30 mL  30 mL Oral QDAY PRN Graciela ELÍAS Duttont, A.P.N.        And   • bisacodyl (DULCOLAX) suppository 10 mg  10 mg Rectal QDAY PRN Graciela Street, A.P.N.       • electrolyte-A (PLASMALYTE-A) infusion   Intravenous PRN Graciela Street, A.P.N.   1,000 mL at 11/21/18 1938   • enoxaparin (LOVENOX) inj 40 mg  40 mg Subcutaneous QDAY Graciela Street, A.P.N.       • mupirocin (BACTROBAN) 2 %  ointment 1 Application  1 Application Topical BID Graciela Street, A.P.N.   1 Application at 11/22/18 0552   • Magnesium Sulfate in D5W IVPB premix 1 g  1 g Intravenous QDAY Graciela Street, A.P.N.   Stopped at 11/21/18 1430   • metoprolol (LOPRESSOR) tablet 12.5 mg  12.5 mg Oral BID Graciela Street, A.P.N.   Stopped at 11/22/18 0600    Followed by   • [START ON 11/23/2018] metoprolol (LOPRESSOR) tablet 25 mg  25 mg Oral BID Graciela Street, A.P.N.       • clopidogrel (PLAVIX) tablet 75 mg  75 mg Oral DAILY Graciela Street, A.P.N.   75 mg at 11/22/18 0552   • clevidipine (CLEVIPREX) IV emulsion  1-21 mg/hr Intravenous Continuous Graciela Street, A.P.N.   Stopped at 11/21/18 1315   • nitroglycerin 50 mg in D5W 250 ml infusion  0-100 mcg/min Intravenous Continuous Graciela Street, A.P.N.   Stopped at 11/21/18 1315   • oxyCODONE immediate-release (ROXICODONE) tablet 5 mg  5 mg Oral Q3HRS PRN Graciela Street, A.P.N.   5 mg at 11/21/18 1839   • oxyCODONE immediate release (ROXICODONE) tablet 10 mg  10 mg Oral Q3HRS PRN Graciela Street, A.P.N.   10 mg at 11/22/18 0810   • dexmedetomidine (PRECEDEX) 400 mcg/100mL NS premix infusion  0-1.5 mcg/kg/hr Intravenous Continuous Graciela Street, A.P.N.   Stopped at 11/22/18 0749   • ondansetron (ZOFRAN) syringe/vial injection 4 mg  4 mg Intravenous Q6HRS PRN Graciela Street, A.P.N.   4 mg at 11/21/18 1829    Or   • prochlorperazine (COMPAZINE) injection 10 mg  10 mg Intravenous Q6HRS PRN Graciela Duttont, A.P.N.        Or   • promethazine (PHENERGAN) suppository 25 mg  25 mg Rectal Q6HRS PRN Graciela Street, A.P.N.       • acetaminophen (TYLENOL) tablet 650 mg  650 mg Oral Q4HRS PRN Graciela Street, A.P.N.   650 mg at 11/22/18 0552    Or   • acetaminophen (TYLENOL) suppository 650 mg  650 mg Rectal Q4HRS PRN Graciela Street, A.P.N.       • mag hydrox-al hydrox-simeth (MAALOX PLUS ES or MYLANTA DS) suspension 30 mL  30 mL Oral Q4HRS PRN Graciela Street, A.P.N.        • diphenhydrAMINE (BENADRYL) tablet/capsule 25 mg  25 mg Oral HS PRN - MR X 1 Graciela Street, A.P.N.       • EPINEPHrine 4 mg in  mL Infusion  0-0.2 mcg/kg/min Intravenous Continuous Graciela Duttont, A.P.N. 30.9 mL/hr at 11/22/18 0805 0.08 mcg/kg/min at 11/22/18 0805   • insulin regular human (HUMULIN/NOVOLIN R) 62.5 Units in  mL infusion per protocol  1-6 Units/hr Intravenous Continuous Graciela Street, A.P.N. 4 mL/hr at 11/22/18 0814 1 Units/hr at 11/22/18 0814    And   • insulin regular (HUMULIN R) injection 0-14 Units  0-14 Units Intravenous Once Graciela Street, A.P.N.   Stopped at 11/21/18 1345    And   • insulin regular (HUMULIN R) injection 0-10 Units  0-10 Units Intravenous PRN Graciela Street, A.P.N.   1 Units at 11/22/18 0715    And   • dextrose 50% (D50W) injection 25 mL  25 mL Intravenous PRN Graciela Street, A.P.N.       • insulin lispro (HUMALOG) injection 0-20 Units  0-20 Units Subcutaneous PRN Graciela Street, A.P.N.       • fentaNYL (SUBLIMAZE) injection 50 mcg  50 mcg Intravenous Q3HRS PRN Graciela Street, A.P.N.   50 mcg at 11/22/18 0508   • aspirin EC (ECOTRIN) tablet 81 mg  81 mg Oral DAILY Sd Ty M.D.   81 mg at 11/22/18 0552   • rosuvastatin (CRESTOR) tablet 20 mg  20 mg Oral Q EVENING Sd Ty M.D.   20 mg at 11/21/18 1943   • ALPRAZolam (XANAX) tablet 0.25 mg  0.25 mg Oral Q6HRS PRN Alexia Holland AVyP.N.   0.25 mg at 11/20/18 2225       Fluids    Intake/Output Summary (Last 24 hours) at 11/22/18 0822  Last data filed at 11/22/18 0600   Gross per 24 hour   Intake          8068.51 ml   Output             5172 ml   Net          2896.51 ml       Laboratory  Recent Labs      11/21/18   1457  11/21/18   1612  11/21/18   1642   ISTATAPH  7.409  7.382*  7.378*   ISTATAPCO2  31.9  35.3  36.2   ISTATAPO2  67  67  60*   ISTATATCO2  21  22  22   YWMGAYT7SUE  93  93  90*   ISTATARTHCO3  20.2  21.0  21.3   ISTATARTBE  -3  -3  -3   ISTATTEMP  36.7 C  37.0 C  37.2  C   ISTATFIO2  60  50  40   ISTATSPEC  Arterial  Arterial  Arterial   ISTATAPHTC  7.414  7.382*  7.375*   YMUUOCEB1UR  66  67  61*     Recent Labs      11/20/18 2246 11/21/18   1300  11/21/18 1715   TROPONINI  0.33*  1.59*  2.60*     Recent Labs      11/20/18   0937  11/20/18 2246 11/21/18   1300  11/21/18 1715  11/21/18 2210 11/22/18   0515   SODIUM  137  138   --    --    --   138   POTASSIUM  4.1  4.2   --   4.1  4.3  4.5   CHLORIDE  102  107   --    --    --   109   CO2  23 22   --    --    --   21   BUN  18 18   --    --    --   16   CREATININE  1.13  1.15   --    --    --   1.05   MAGNESIUM   --    --   2.2   --    --    --    CALCIUM  9.7  9.3   --    --    --   8.2*     Recent Labs      11/20/18 0937 11/20/18 2246 11/22/18   0515   ALTSGPT  20 17   --    ASTSGOT  20 18   --    ALKPHOSPHAT  103*  97   --    TBILIRUBIN  1.7*  0.9   --    GLUCOSE  115*  102*  141*     Recent Labs      11/20/18   0201  11/20/18   0937  11/20/18 2246 11/22/18   0515   WBC  8.4  10.4  11.7*  13.5*   NEUTSPOLYS  48.70   --   62.60   --    LYMPHOCYTES  31.00   --   21.00*   --    MONOCYTES  15.80*   --   11.50   --    EOSINOPHILS  3.80   --   3.80   --    BASOPHILS  0.60   --   0.80   --    ASTSGOT   --   20 18   --    ALTSGPT   --   20 17   --    ALKPHOSPHAT   --   103*  97   --    TBILIRUBIN   --   1.7*  0.9   --      Recent Labs      11/20/18   0410  11/20/18   0937   11/20/18 1714 11/20/18 2246 11/21/18   0036  11/21/18   1300  11/22/18   0515   RBC   --   5.42   --    --   5.11   --    --   4.16*   HEMOGLOBIN   --   18.4*   --    --   17.5   --    --   14.2   HEMATOCRIT   --   52.8*   --    --   50.0   --    --   41.1*   PLATELETCT   --   383   --    --   378   --   264  266   PROTHROMBTM  13.4  13.1   --    --    --    --   15.6*   --    APTT  44.4*  93.4*   < >  70.4*   --   39.1*  24.5*   --    INR  1.01  0.99   --    --    --    --   1.23*   --     < > = values in this interval not  displayed.       Imaging  X-Ray:  My impression is: low lung volumes bilaterally    Assessment/Plan  * NSTEMI (non-ST elevated myocardial infarction) (HCC)   Assessment & Plan    Now revascularized with CABG     Essential hypertension- (present on admission)   Assessment & Plan    On low dose pressor immediately post-op CABG     Class 1 obesity due to excess calories without serious comorbidity with body mass index (BMI) of 30.0 to 30.9 in adult   Assessment & Plan    Chronic challenge for pt          VTE:  Heparin  Ulcer: Not Indicated  Lines: Central Line  Ongoing indication addressed    I have performed a physical exam and reviewed and updated ROS and Plan today (11/22/2018). In review of yesterday's note (11/21/2018), there are no changes except as documented above.     Discussed patient condition and risk of morbidity and/or mortality with Hospitalist, RN, RT, Therapies and Pharmacy  The patient remains criticall ill with substantial focus on my part to reduce his acute hypoxic respiratory faliure and decrease his risk of requiring reinbutation. Critical Care time = 36 minutes

## 2018-11-22 NOTE — PROGRESS NOTES
Page to on call PRATEEK Street. Patient complaining of constant discomfort/pain. Vitals stable on 5L oxymask. Orders received for 1G CaCl, 50mg Q3 PRN Fent, x1 15 mg PO MS Contin, and an additional 1L Plasma Lyte.

## 2018-11-22 NOTE — PROGRESS NOTES
Cardiology Follow Up Progress Note    Date of Service  11/22/2018    Attending Physician  Mohan Verdin M.D.    Chief Complaint   Chest pain, NSTEMI.    ANTHONY Chaudhari is a 68 y.o. male admitted 11/19/2018 with CAD s/p CABG x 3 (left internal mammary artery to mid left anterior descending artery, reverse saphenous vein graft to posterior descending artery, reverse saphenous vein graft to diagonal branch).    Interim Events  Still with chest tubes.  Extubated.  Still in sinus.    Review of Systems  Review of Systems   Constitutional: Negative for chills and fever.   HENT: Negative for ear discharge, ear pain, hearing loss and nosebleeds.    Eyes: Negative for pain and discharge.   Respiratory: Negative for cough and shortness of breath.    Cardiovascular: Positive for chest pain. Negative for palpitations and leg swelling.   Gastrointestinal: Negative for abdominal pain, blood in stool, nausea and vomiting.   Genitourinary: Negative for dysuria and hematuria.   Musculoskeletal: Negative for myalgias.   Skin: Negative for rash.   Allergic/Immunologic: Negative for environmental allergies.   Neurological: Negative for dizziness and headaches.   Hematological: Does not bruise/bleed easily.   Psychiatric/Behavioral: Negative for hallucinations and suicidal ideas.       Vital signs in last 24 hours  Temp:  [37.3 °C (99.1 °F)-38.1 °C (100.6 °F)] 38.1 °C (100.6 °F)  Pulse:  [61-85] 72  Resp:  [15-30] 24    Physical Exam  Physical Exam   Constitutional: He is oriented to person, place, and time. No distress.   HENT:   Head: Normocephalic and atraumatic.   Eyes: EOM are normal.   Neck: Normal range of motion. No JVD present.   Cardiovascular: Normal rate, regular rhythm and normal heart sounds.  Exam reveals no gallop and no friction rub.    No murmur heard.  Pulmonary/Chest: No respiratory distress. He has no wheezes. He has no rales. He exhibits no tenderness.   Chest tubes   Abdominal: Soft. Bowel sounds are normal.  There is no tenderness. There is no rebound and no guarding.   The is no presence of abdominal bruits   Musculoskeletal: Normal range of motion.   Neurological: He is alert and oriented to person, place, and time.   Skin: Skin is warm and dry.   Psychiatric: He has a normal mood and affect.       Lab Review  Lab Results   Component Value Date/Time    WBC 13.5 (H) 11/22/2018 05:15 AM    RBC 4.16 (L) 11/22/2018 05:15 AM    HEMOGLOBIN 14.2 11/22/2018 05:15 AM    HEMATOCRIT 41.1 (L) 11/22/2018 05:15 AM    MCV 98.8 (H) 11/22/2018 05:15 AM    MCH 34.1 (H) 11/22/2018 05:15 AM    MCHC 34.5 11/22/2018 05:15 AM    MPV 9.1 11/22/2018 05:15 AM      Lab Results   Component Value Date/Time    SODIUM 138 11/22/2018 05:15 AM    POTASSIUM 4.5 11/22/2018 05:15 AM    CHLORIDE 109 11/22/2018 05:15 AM    CO2 21 11/22/2018 05:15 AM    GLUCOSE 141 (H) 11/22/2018 05:15 AM    BUN 16 11/22/2018 05:15 AM    CREATININE 1.05 11/22/2018 05:15 AM      Lab Results   Component Value Date/Time    ASTSGOT 18 11/20/2018 10:46 PM    ALTSGPT 17 11/20/2018 10:46 PM     Lab Results   Component Value Date/Time    CHOLSTRLTOT 209 (H) 11/20/2018 02:01 AM     (H) 11/20/2018 02:01 AM    HDL 35 (A) 11/20/2018 02:01 AM    TRIGLYCERIDE 257 (H) 11/20/2018 02:01 AM    TROPONINI 2.60 (H) 11/21/2018 05:15 PM               Assessment/Plan  Principal Problem:    NSTEMI (non-ST elevated myocardial infarction) (HCC) POA: Unknown  Active Problems:    Essential hypertension POA: Yes    Acute respiratory failure following trauma and surgery (HCC) POA: No      Overview: Pt on wean post-op CABG, Evaluated and re-evaluated me along with RT and       nursing. He meets weaning parameters, he is awake, alert, moves all 4       extremities, able to lift his head off the bed, minimal secretions, on       minimal pressor, good cough and gag.             I will give patient a trial of extubation.     Class 1 obesity due to excess calories without serious comorbidity with body  mass index (BMI) of 30.0 to 30.9 in adult POA: Unknown  Resolved Problems:    * No resolved hospital problems. *      Post op care.  Doing well.  Optimize electrolytes to keep Mg above 2.5 and Potassium above 4.5  Continue ADP and statin.  Slowly add ACE-I or ARB if able.    Thank you for allowing me to participate in the care of this patient.  Cardiology will sign off on this patient    Please contact me with any questions.    Abraham Greenfield M.D.   Cardiologist, [facility]  [phone]

## 2018-11-22 NOTE — PROGRESS NOTES
Patient transported to CIC room with OR team. Patient connected to room monitoring equipment and report received from Dr. Villegas. Infusions verified. Dr. Verdin at bedside. Chest tubes connected to suction, Annette hugger placed, and pacer function confirmed though not requiring pacing at this time.

## 2018-11-22 NOTE — PROGRESS NOTES
Graciela at bedside. Orders to give albumin and start kuldeep in order to attempt to wean epi down. Call graciela back if Kuldeep gets to 30.

## 2018-11-22 NOTE — CARE PLAN
Problem: Day of surgery post CABG/Heart valve replacement  Goal: Stabilization in immediate post op period    Intervention: VS q 15 min x 4 hours, then q 1 hour. Include temperature immediately upon arrival. Check CO/CI q 2-4 hours and PRN  Validated in flowsheet  Intervention: If radial artery used, elevate arm, no BP checks or needle sticks from affected arm, monitor ulnar pulse and capillary refill  Cuff on right arm  Intervention: First post op hour labs and diagnostics per MD order  Labs done per orders  Intervention: Serum K q 6 hours x 24 hours.  ABG and CBC prn.  Initial potassium acquired via istat  Intervention: For FSBS greater than 130, start Post Cardiac Surgery Insulin Drip Protocol  Patient arrived to unit with insulin infusing  Intervention: FSBS frequency as per Cardiac Surgery Insulin Drip Protocol  Done hourly  Intervention: Chest tube to 20 cm suction, record CT drainage with VS  Both eran and mediastinal to suction  Intervention: For CT drainage > 300 cc in first post op hour and/or 150 cc in subsequent hours: platelets, coag screen, fibrinogen, H&H per order  Chest tube output well under parameters  Intervention: Titrate and wean off vasoactive drips per patient's condition and per MD order while maintaining SBP  mmHg per MD order  Patient tolerated epi weaning throughout shift  Intervention: Wean from vent per protocol (see protocol), extubation goal with 2-6 hours post op.  Tolerated weaning, Dr. Hillman consulted before extubation, see ABG. Dr. Hillman agreed with extubation and patient was extubated at 1700.

## 2018-11-22 NOTE — PROGRESS NOTES
Monitor summary: patient sinus throughout shift with rates 60-75. Rare PVCs. No pacing required. Pre-existing Right Bundle. 0.20/0.13/0.46

## 2018-11-22 NOTE — ASSESSMENT & PLAN NOTE
Pt's high oxygen requirement largely driven by poor inspiratory effort with related poor lung volumes  But now appears to have pneumonia LLL  - doing better with increased ambulation  - weaning oxygen but still needs 4 liters NC    With increased mobility much better lung expansion both clinically and radiographically. Now down to 4 liters. Still opacity/ atalectasis at left base but improving  Continue increasing activity  On nasal canula doing fairly well  Down to 2 liters NC, but will need home O2

## 2018-11-22 NOTE — RESPIRATORY CARE
Extubation    Cuff leak noted yes  Stridor present no           Patient toleration yes  RCP Complete? yes  Events/Summary/Plan: Pt extubated. (11/21/18 9588)

## 2018-11-22 NOTE — PROGRESS NOTES
Patient experiences profound vasoplegia (BP drops to as low as mid 40s/30s with coughing and suctioning) and stated at one point that he became numb and couldn't hear for a couple minutes while hypotensive. Patient's O2 saturation quickly drops and takes a while to recover when taking oxygen off to suction. Patient's O2 has remained boarder line low through shift (89-94%).   On call PRATEEK Street paged and updated. RN will continue to monitor. No new interventions at this time.  On call Intensivist Dr. Duke updated on Pulmonary status and orders received to initiate HiFlo and transition to Nasal BiPap if necessary.

## 2018-11-23 ENCOUNTER — APPOINTMENT (OUTPATIENT)
Dept: RADIOLOGY | Facility: MEDICAL CENTER | Age: 68
DRG: 233 | End: 2018-11-23
Attending: INTERNAL MEDICINE
Payer: COMMERCIAL

## 2018-11-23 ENCOUNTER — APPOINTMENT (OUTPATIENT)
Dept: RADIOLOGY | Facility: MEDICAL CENTER | Age: 68
DRG: 233 | End: 2018-11-23
Attending: NURSE PRACTITIONER
Payer: COMMERCIAL

## 2018-11-23 LAB
ANION GAP SERPL CALC-SCNC: 9 MMOL/L (ref 0–11.9)
BUN SERPL-MCNC: 20 MG/DL (ref 8–22)
CALCIUM SERPL-MCNC: 8.8 MG/DL (ref 8.5–10.5)
CHLORIDE SERPL-SCNC: 105 MMOL/L (ref 96–112)
CO2 SERPL-SCNC: 19 MMOL/L (ref 20–33)
CREAT SERPL-MCNC: 1.17 MG/DL (ref 0.5–1.4)
ERYTHROCYTE [DISTWIDTH] IN BLOOD BY AUTOMATED COUNT: 55.1 FL (ref 35.9–50)
GLUCOSE BLD-MCNC: 114 MG/DL (ref 65–99)
GLUCOSE BLD-MCNC: 114 MG/DL (ref 65–99)
GLUCOSE BLD-MCNC: 119 MG/DL (ref 65–99)
GLUCOSE BLD-MCNC: 124 MG/DL (ref 65–99)
GLUCOSE BLD-MCNC: 125 MG/DL (ref 65–99)
GLUCOSE BLD-MCNC: 126 MG/DL (ref 65–99)
GLUCOSE BLD-MCNC: 128 MG/DL (ref 65–99)
GLUCOSE BLD-MCNC: 130 MG/DL (ref 65–99)
GLUCOSE BLD-MCNC: 131 MG/DL (ref 65–99)
GLUCOSE BLD-MCNC: 134 MG/DL (ref 65–99)
GLUCOSE BLD-MCNC: 134 MG/DL (ref 65–99)
GLUCOSE BLD-MCNC: 158 MG/DL (ref 65–99)
GLUCOSE BLD-MCNC: 98 MG/DL (ref 65–99)
GLUCOSE BLD-MCNC: 98 MG/DL (ref 65–99)
GLUCOSE SERPL-MCNC: 135 MG/DL (ref 65–99)
HCT VFR BLD AUTO: 40 % (ref 42–52)
HGB BLD-MCNC: 13.6 G/DL (ref 14–18)
MCH RBC QN AUTO: 34 PG (ref 27–33)
MCHC RBC AUTO-ENTMCNC: 34 G/DL (ref 33.7–35.3)
MCV RBC AUTO: 100 FL (ref 81.4–97.8)
PLATELET # BLD AUTO: 256 K/UL (ref 164–446)
PMV BLD AUTO: 9.3 FL (ref 9–12.9)
POTASSIUM SERPL-SCNC: 4.4 MMOL/L (ref 3.6–5.5)
RBC # BLD AUTO: 4 M/UL (ref 4.7–6.1)
SODIUM SERPL-SCNC: 133 MMOL/L (ref 135–145)
WBC # BLD AUTO: 16.5 K/UL (ref 4.8–10.8)

## 2018-11-23 PROCEDURE — 700102 HCHG RX REV CODE 250 W/ 637 OVERRIDE(OP): Performed by: NURSE PRACTITIONER

## 2018-11-23 PROCEDURE — G8978 MOBILITY CURRENT STATUS: HCPCS | Mod: CK

## 2018-11-23 PROCEDURE — 71045 X-RAY EXAM CHEST 1 VIEW: CPT

## 2018-11-23 PROCEDURE — 700111 HCHG RX REV CODE 636 W/ 250 OVERRIDE (IP): Performed by: NURSE PRACTITIONER

## 2018-11-23 PROCEDURE — 700101 HCHG RX REV CODE 250: Performed by: NURSE PRACTITIONER

## 2018-11-23 PROCEDURE — A9270 NON-COVERED ITEM OR SERVICE: HCPCS | Performed by: INTERNAL MEDICINE

## 2018-11-23 PROCEDURE — 302196 LINEN, HYPOALLERGENIC: Performed by: THORACIC SURGERY (CARDIOTHORACIC VASCULAR SURGERY)

## 2018-11-23 PROCEDURE — 93010 ELECTROCARDIOGRAM REPORT: CPT | Performed by: INTERNAL MEDICINE

## 2018-11-23 PROCEDURE — A9270 NON-COVERED ITEM OR SERVICE: HCPCS | Performed by: NURSE PRACTITIONER

## 2018-11-23 PROCEDURE — 770020 HCHG ROOM/CARE - TELE (206)

## 2018-11-23 PROCEDURE — 94668 MNPJ CHEST WALL SBSQ: CPT

## 2018-11-23 PROCEDURE — G8979 MOBILITY GOAL STATUS: HCPCS | Mod: CI

## 2018-11-23 PROCEDURE — 97535 SELF CARE MNGMENT TRAINING: CPT

## 2018-11-23 PROCEDURE — 700102 HCHG RX REV CODE 250 W/ 637 OVERRIDE(OP): Performed by: THORACIC SURGERY (CARDIOTHORACIC VASCULAR SURGERY)

## 2018-11-23 PROCEDURE — 85027 COMPLETE CBC AUTOMATED: CPT

## 2018-11-23 PROCEDURE — 94640 AIRWAY INHALATION TREATMENT: CPT

## 2018-11-23 PROCEDURE — 700102 HCHG RX REV CODE 250 W/ 637 OVERRIDE(OP): Performed by: INTERNAL MEDICINE

## 2018-11-23 PROCEDURE — A9270 NON-COVERED ITEM OR SERVICE: HCPCS | Performed by: THORACIC SURGERY (CARDIOTHORACIC VASCULAR SURGERY)

## 2018-11-23 PROCEDURE — 82962 GLUCOSE BLOOD TEST: CPT | Mod: 91

## 2018-11-23 PROCEDURE — 93005 ELECTROCARDIOGRAM TRACING: CPT | Performed by: NURSE PRACTITIONER

## 2018-11-23 PROCEDURE — 80048 BASIC METABOLIC PNL TOTAL CA: CPT

## 2018-11-23 PROCEDURE — 97163 PT EVAL HIGH COMPLEX 45 MIN: CPT

## 2018-11-23 RX ORDER — DIPHENHYDRAMINE HCL 25 MG
25 TABLET ORAL EVERY 6 HOURS PRN
Status: DISCONTINUED | OUTPATIENT
Start: 2018-11-23 | End: 2018-11-29 | Stop reason: HOSPADM

## 2018-11-23 RX ORDER — SODIUM CHLORIDE 9 MG/ML
INJECTION, SOLUTION INTRAVENOUS
Status: ACTIVE
Start: 2018-11-23 | End: 2018-11-23

## 2018-11-23 RX ORDER — FUROSEMIDE 10 MG/ML
20 INJECTION INTRAMUSCULAR; INTRAVENOUS
Status: DISCONTINUED | OUTPATIENT
Start: 2018-11-23 | End: 2018-11-24

## 2018-11-23 RX ORDER — POTASSIUM CHLORIDE 20 MEQ/1
10 TABLET, EXTENDED RELEASE ORAL 2 TIMES DAILY
Status: DISCONTINUED | OUTPATIENT
Start: 2018-11-23 | End: 2018-11-24

## 2018-11-23 RX ADMIN — POLYETHYLENE GLYCOL 3350 1 PACKET: 17 POWDER, FOR SOLUTION ORAL at 12:17

## 2018-11-23 RX ADMIN — HYDROCODONE BITARTRATE AND ACETAMINOPHEN 1 TABLET: 5; 325 TABLET ORAL at 22:06

## 2018-11-23 RX ADMIN — FUROSEMIDE 20 MG: 10 INJECTION, SOLUTION INTRAMUSCULAR; INTRAVENOUS at 17:03

## 2018-11-23 RX ADMIN — HYDROCODONE BITARTRATE AND ACETAMINOPHEN 2 TABLET: 5; 325 TABLET ORAL at 12:17

## 2018-11-23 RX ADMIN — FUROSEMIDE 20 MG: 10 INJECTION, SOLUTION INTRAMUSCULAR; INTRAVENOUS at 08:00

## 2018-11-23 RX ADMIN — HYDROCODONE BITARTRATE AND ACETAMINOPHEN 2 TABLET: 5; 325 TABLET ORAL at 07:54

## 2018-11-23 RX ADMIN — POTASSIUM CHLORIDE 10 MEQ: 1500 TABLET, EXTENDED RELEASE ORAL at 17:47

## 2018-11-23 RX ADMIN — GABAPENTIN 100 MG: 100 CAPSULE ORAL at 17:45

## 2018-11-23 RX ADMIN — POTASSIUM CHLORIDE 10 MEQ: 1500 TABLET, EXTENDED RELEASE ORAL at 07:55

## 2018-11-23 RX ADMIN — HYDROCODONE BITARTRATE AND ACETAMINOPHEN 2 TABLET: 5; 325 TABLET ORAL at 17:45

## 2018-11-23 RX ADMIN — MAGNESIUM SULFATE HEPTAHYDRATE 1 G: 1 INJECTION, SOLUTION INTRAVENOUS at 13:28

## 2018-11-23 RX ADMIN — GABAPENTIN 100 MG: 100 CAPSULE ORAL at 05:02

## 2018-11-23 RX ADMIN — ASPIRIN 81 MG: 81 TABLET, COATED ORAL at 05:01

## 2018-11-23 RX ADMIN — MUPIROCIN 1 APPLICATION: 20 OINTMENT TOPICAL at 17:51

## 2018-11-23 RX ADMIN — STANDARDIZED SENNA CONCENTRATE AND DOCUSATE SODIUM 2 TABLET: 8.6; 5 TABLET, FILM COATED ORAL at 17:50

## 2018-11-23 RX ADMIN — CLOPIDOGREL 75 MG: 75 TABLET, FILM COATED ORAL at 05:01

## 2018-11-23 RX ADMIN — ENOXAPARIN SODIUM 40 MG: 100 INJECTION SUBCUTANEOUS at 17:51

## 2018-11-23 RX ADMIN — INSULIN HUMAN 5 UNITS: 100 INJECTION, SUSPENSION SUBCUTANEOUS at 14:56

## 2018-11-23 RX ADMIN — MUPIROCIN 1 APPLICATION: 20 OINTMENT TOPICAL at 05:26

## 2018-11-23 RX ADMIN — INSULIN HUMAN 5 UNITS: 100 INJECTION, SUSPENSION SUBCUTANEOUS at 05:15

## 2018-11-23 RX ADMIN — METOPROLOL TARTRATE 25 MG: 25 TABLET, FILM COATED ORAL at 17:49

## 2018-11-23 RX ADMIN — STANDARDIZED SENNA CONCENTRATE AND DOCUSATE SODIUM 2 TABLET: 8.6; 5 TABLET, FILM COATED ORAL at 05:01

## 2018-11-23 RX ADMIN — OXYCODONE HYDROCHLORIDE 10 MG: 10 TABLET ORAL at 05:02

## 2018-11-23 RX ADMIN — ROSUVASTATIN CALCIUM 20 MG: 10 TABLET, FILM COATED ORAL at 17:47

## 2018-11-23 ASSESSMENT — ENCOUNTER SYMPTOMS
MUSCULOSKELETAL NEGATIVE: 1
NEUROLOGICAL NEGATIVE: 1
COUGH: 1
GASTROINTESTINAL NEGATIVE: 1
WEAKNESS: 1
BACK PAIN: 1
EYES NEGATIVE: 1
PSYCHIATRIC NEGATIVE: 1
SHORTNESS OF BREATH: 1
MYALGIAS: 1
CARDIOVASCULAR NEGATIVE: 1
CONSTITUTIONAL NEGATIVE: 1

## 2018-11-23 ASSESSMENT — PAIN SCALES - GENERAL
PAINLEVEL_OUTOF10: 1
PAINLEVEL_OUTOF10: 5
PAINLEVEL_OUTOF10: 2
PAINLEVEL_OUTOF10: 5
PAINLEVEL_OUTOF10: 7
PAINLEVEL_OUTOF10: 2
PAINLEVEL_OUTOF10: 8
PAINLEVEL_OUTOF10: 5
PAINLEVEL_OUTOF10: 8
PAINLEVEL_OUTOF10: 2
PAINLEVEL_OUTOF10: 4
PAINLEVEL_OUTOF10: 5
PAINLEVEL_OUTOF10: 2
PAINLEVEL_OUTOF10: 5
PAINLEVEL_OUTOF10: 2
PAINLEVEL_OUTOF10: 5
PAINLEVEL_OUTOF10: 4

## 2018-11-23 ASSESSMENT — COGNITIVE AND FUNCTIONAL STATUS - GENERAL
MOVING FROM LYING ON BACK TO SITTING ON SIDE OF FLAT BED: UNABLE
SUGGESTED CMS G CODE MODIFIER MOBILITY: CM
TURNING FROM BACK TO SIDE WHILE IN FLAT BAD: A LOT
CLIMB 3 TO 5 STEPS WITH RAILING: TOTAL
STANDING UP FROM CHAIR USING ARMS: A LOT
WALKING IN HOSPITAL ROOM: A LOT
MOVING TO AND FROM BED TO CHAIR: UNABLE
MOBILITY SCORE: 9

## 2018-11-23 ASSESSMENT — PATIENT HEALTH QUESTIONNAIRE - PHQ9
1. LITTLE INTEREST OR PLEASURE IN DOING THINGS: NOT AT ALL
SUM OF ALL RESPONSES TO PHQ9 QUESTIONS 1 AND 2: 0
2. FEELING DOWN, DEPRESSED, IRRITABLE, OR HOPELESS: NOT AT ALL

## 2018-11-23 ASSESSMENT — GAIT ASSESSMENTS
ASSISTIVE DEVICE: FRONT WHEEL WALKER
DEVIATION: BRADYKINETIC;SHUFFLED GAIT
DISTANCE (FEET): 45
GAIT LEVEL OF ASSIST: MINIMAL ASSIST

## 2018-11-23 NOTE — THERAPY
"Physical Therapy Evaluation completed.   Bed Mobility:  Supine to Sit:  (up in chair )  Transfers: Sit to Stand: Moderate Assist  Gait: Level Of Assist: Minimal Assist with Front-Wheel Walker       Plan of Care: Will benefit from Physical Therapy 4 times per week  Discharge Recommendations: Equipment: Will Continue to Assess for Equipment Needs. Post-acute therapy Recommend inpatient transitional care services for continued physical therapy services. Please consider physiatry (PM&R) consult.     Mr. Chaudhari is a 69 y/o male who presents to acute secondary to NSTEMI and CABG. He presents with significant dynamic balance deficits, gross motor coordination impairments, and sequencing deficits secondary to this surgical procedure and resulting precautions. Provided patient and spouse with open heart handout and reviewed including walking program, talk test, RPE scale, and recovery expectations. During mobility portion of eval pt was most limited by SOB. During gait significant increase in WOB along with deterioration of gait pattern (increased hip and knee flexion with postural sway) thus gait was terminated by therapist. At rest HR 91 bpm, after ambulating 106 bpm. SpO2 93% on 6L after gait. Pt is very motivated to participate with therapies and has very supportive spouse who was present and engaged during treatment. Due to current level of assist required, remote home location, and need for medical monitoring strongly recommend intensive post acute rehabilitation prior to discharge home. He would benefit from continued acute physical therapy services to improve his mobility and decrease risk for falls.     See \"Rehab Therapy-Acute\" Patient Summary Report for complete documentation.     "

## 2018-11-23 NOTE — PROGRESS NOTES
Monitor Summary: Normal Sinus 70s-80s 0.18/0.1/0.4 with no ectopy noted. Patient's BP is more stable with less intense drops in BP with movement.     12 hour chart check

## 2018-11-23 NOTE — PROGRESS NOTES
Graciela Street called for updates. Updates given including vasopressor therapy support, HFNC settings, and marginal urine output, as well as pain control issues.

## 2018-11-23 NOTE — CARE PLAN
Problem: Hyperinflation:  Goal: Prevent or improve atelectasis    Intervention: Instruct incentive spirometry usage  1000ml

## 2018-11-23 NOTE — CARE PLAN
Problem: Post op day 2 CABG/Heart Valve Replacement  Goal: Optimal care of the post op CABG/heart valve replacement post op day 2    Intervention: FSBS: when 2 consecutive BS < 130 after post op day 2, discontinue FSBS unless patient is insulin dependent diabetic  Waiting for BS to drop below 130 consistently  Intervention: Daily Weights  Done in AM  Intervention: Up in chair for all meals  Done  Intervention: Ambulate, increasing the distance each time x 3 and before bed  Patient unable to ambulate, but encouraging walk in place multiple times  Intervention: Stand at sink and wash up with assistance.  Clean incisions twice daily with soap and water.  Previna in place  Intervention: IS q 1 hour while awake and record best IS volume  In use with increasing strength

## 2018-11-23 NOTE — PROGRESS NOTES
Cardiovascular Surgery Progress Note    Name: Romario Chaudhari  MRN: [id number]  : 1950  Admit Date: 2018  7:44 PM  Procedure:  Procedure(s) and Anesthesia Type:     * MULTIPLE CORONARY ARTERY BYPASS x3, RIGHT LEG ENDOSCOPIC VEIN HARVEST - General     * GOOD - General  2 Day Post-Op    Vitals:  Patient Vitals for the past 8 hrs:   Monitored Temp 2 SpO2 O2 Delivery O2 (LPM) Pulse Heart Rate (Monitored) Resp NIBP   18 1254 - - Silicone Nasal Cannula 5 92 93 (!) 23 123/81   18 1220 38.1 °C (100.6 °F) 92 % - - 97 98 (!) 30 -   18 1200 38.1 °C (100.6 °F) 92 % - - 93 95 19 108/78   18 1100 38 °C (100.4 °F) 92 % - - 87 86 (!) 31 104/80   18 1000 38 °C (100.4 °F) 92 % - - 86 87 (!) 28 -   18 0934 - 91 % - 6 87 98 (!) 22 -   18 0900 37.9 °C (100.2 °F) 91 % - - 88 88 (!) 29 128/78   18 0845 37.9 °C (100.2 °F) 91 % - - 86 86 (!) 22 -   18 0831 - 91 % Silicone Nasal Cannula 6 - - - -   18 0800 38.1 °C (100.6 °F) - - - 88 88 18 114/69   18 0722 - 93 % - 10 85 85 18 -   18 0700 38.1 °C (100.6 °F) - - - 84 84 (!) 25 106/74   18 0600 38 °C (100.4 °F) 92 % Non-Rebreather Mask 15 82 82 20 (!) 99/67     Temp (24hrs), Av.8 °C (100.1 °F), Min:37.5 °C (99.5 °F), Max:38.1 °C (100.6 °F)      Respiratory:    Respiration: (!) 23, Pulse Oximetry: 92 %, O2 Daily Delivery Respiratory : Silicone Nasal Cannula     Chest Tube Drains:          Fluids:    Intake/Output Summary (Last 24 hours) at 18 1301  Last data filed at 18 1100   Gross per 24 hour   Intake           869.89 ml   Output             1695 ml   Net          -825.11 ml     Admit weight: Weight: 103.9 kg (229 lb 0.9 oz)  Current weight: Weight: 112 kg (246 lb 14.6 oz) (18 0400)    Labs:  Recent Labs      18   2246  18   1300  18   0515  18   0500   WBC  11.7*   --   13.5*  16.5*   RBC  5.11   --   4.16*  4.00*   HEMOGLOBIN  17.5   --   14.2  13.6*    HEMATOCRIT  50.0   --   41.1*  40.0*   MCV  97.8   --   98.8*  100.0*   MCH  34.2*   --   34.1*  34.0*   MCHC  35.0   --   34.5  34.0   RDW  52.2*   --   51.7*  55.1*   PLATELETCT  378  264  266  256   MPV  8.9*   --   9.1  9.3     Recent Labs      11/20/18   2246   NEUTSPOLYS  62.60   LYMPHOCYTES  21.00*   MONOCYTES  11.50   EOSINOPHILS  3.80   BASOPHILS  0.80     Recent Labs      11/20/18   2246   11/21/18   2210  11/22/18   0515  11/23/18   0500   SODIUM  138   --    --   138  133*   POTASSIUM  4.2   < >  4.3  4.5  4.4   CHLORIDE  107   --    --   109  105   CO2  22   --    --   21  19*   GLUCOSE  102*   --    --   141*  135*   BUN  18   --    --   16  20   CREATININE  1.15   --    --   1.05  1.17   CALCIUM  9.3   --    --   8.2*  8.8    < > = values in this interval not displayed.     Recent Labs      11/20/18   1714  11/21/18   0036  11/21/18   1300   APTT  70.4*  39.1*  24.5*   INR   --    --   1.23*       Medications:  • NS       • furosemide  20 mg     • potassium chloride SA  10 mEq     • insulin NPH  5 Units     • insulin lispro  3 Units     • insulin lispro  0-15 Units     • gabapentin  100 mg     • senna-docusate  2 Tab     • enoxaparin  40 mg     • mupirocin  1 Application     • magnesium sulfate  1 g Stopped (11/22/18 1346)   • metoprolol  25 mg     • clopidogrel  75 mg     • aspirin EC  81 mg     • rosuvastatin  20 mg          Ordered Medications:    ASA - Yes    Plavix - Yes    Post-operative Beta Blockers - Yes--    Ace Inhibitor - No; contraindicated because of Other normal ef    Statin - Yes          Central Line:  Type of line: TLC   Date of insertion: 11/21  Date of removal: see rn notes    Exam:   Review of Systems   Constitutional: Negative.    HENT: Negative.    Eyes: Negative.    Cardiovascular: Negative.    Gastrointestinal: Negative.    Genitourinary: Negative.    Musculoskeletal: Positive for back pain, joint pain and myalgias.   Skin: Negative.    Neurological: Negative.     Endo/Heme/Allergies: Negative.    Psychiatric/Behavioral: Negative.        Physical Exam   Constitutional: He is oriented to person, place, and time. He appears well-developed and well-nourished.   obese   HENT:   Head: Normocephalic.   Eyes: Pupils are equal, round, and reactive to light.   Neck: Normal range of motion. No JVD present.   Cardiovascular: Normal rate and regular rhythm.  Exam reveals no friction rub.    Pulmonary/Chest: He has decreased breath sounds in the right lower field and the left lower field.   Abdominal: Soft. Bowel sounds are normal. He exhibits no distension. There is no guarding.   Musculoskeletal: Normal range of motion.   Neurological: He is alert and oriented to person, place, and time.   Skin: Skin is warm and dry.   prevena to chest, evh site cdi   Psychiatric: He has a normal mood and affect.       Quality Measures:   Quality-Core Measures   Reviewed items::  EKG reviewed, Radiology images reviewed, Labs reviewed and Medications reviewed  Munoz catheter::  One or Two Days Post Surgery (Day of Surgery being Day 0)  Central line in place:  Vasopressors  DVT prophylaxis pharmacological::  Enoxaparin (Lovenox)  DVT prophylaxis - mechanical:  SCDs  Ulcer Prophylaxis::  Yes  Assessed for rehabilitation services:  Patient was assess for and/or received rehabilitation services during this hospitalization      Assessment/Plan:  POD 1 Extubated, on HF NC.  CXR unchanged.  On Mod dose epi, CVP 3, +2.6L.  Episodes Hotn with coughing.  CT output min, no airleka.  PLAN:  Albumin bolus/caCl.  Add low dose bryan, wean down epi.  Discussed recent bronchitis/ABX with pulm--tx deferred to them.  Will start diuresis when BP allows. DC mediastinal CTs  POD 2 NSR, HDS off pressors, NC off high flow, feeling better today. +4L, edema.  Passing gas, no BM. PLAN:  DC wires/eran/foely.  Diurese.  AMB/IS    Active Hospital Problems    Diagnosis   • Acute respiratory failure following trauma and surgery (HCC)  [J95.821]     Priority: High     Pt on wean post-op CABG, Evaluated and re-evaluated me along with RT and nursing. He meets weaning parameters, he is awake, alert, moves all 4 extremities, able to lift his head off the bed, minimal secretions, on minimal pressor, good cough and gag.     I will give patient a trial of extubation.      • Essential hypertension [I10]     Priority: High   • NSTEMI (non-ST elevated myocardial infarction) (HCC) [I21.4]   • Class 1 obesity due to excess calories without serious comorbidity with body mass index (BMI) of 30.0 to 30.9 in adult [E66.09, Z68.30]

## 2018-11-23 NOTE — CARE PLAN
Problem: Post Op Day 1 CABG/Heart Valve Replacement  Goal: Optimal care of the post op CABG/heart valve replacement Post Op Day 1    Intervention: EKG and CXR completed  done  Intervention: All valve patients: PT/INR daily  n/a  Intervention: Antibiotics are discontinued within 24 hours of anesthesia end time unless indication documented for continuation beyond 24 hours  Was previously on ABX for achilles wound. Addressed with MD in AM  Intervention: Daily Weights  done  Intervention: Up in chair for all meals  done  Intervention: Ambulate in am if stable. First ambulation is 25 feet. Repeat x 3 as tolerated.  Ambulate again before bed.  Up to chair, resisted standing. HFNC limited ambulation today.   Intervention: Discontinue syed catheter unless documented reason for continuation  Maintain in place today  Intervention: Remove original surgical dressing after 24 hrs, leave open to air unless otherwise specified by physician  prevena to be on until pod7  Intervention: Consider chest tube removal by MD  Mediastinals discontinued  Intervention: IS q 1 hour while awake and record best IS volume  Done- best 1300  Intervention: Graduated elastic stockings  done  Intervention: Saline lock IV  Still requires vasopressor support  Intervention: Transfer to [facility] status, begin VS q 4 hours  Still requires epi and HFNC  Intervention: After 24th hour post-anesthesia end time, transition patient to Cardiac Surgery SQ Insulin Protocol  done  Intervention: If patient is CABG or on home beta-blocker, start Beta-Blocker on POD 1 or POD 2 or contraindication documented  Held in AM due to BBB and pressor therapy

## 2018-11-23 NOTE — PROGRESS NOTES
Mediastinal chest tubes removed by heart trained RN 30 minutes after pain medication given. Air leak checked and tube clamped immediately prior to D/C. Pt desaturated into the low 80's from lying supine during preparation but no complications with removal noted. Recovered once repositionedafter tube removed. Site dressed with gauze and silk tape.  Approximately 80ml new drainage in cannister. Bakari remains in place.

## 2018-11-23 NOTE — PROGRESS NOTES
Critical Care Progress Note    Date of admission  11/19/2018    Chief Complaint  68 y.o. male admitted 11/19/2018 with s/p CABG    Hospital Course   Extubated in ICU about 4 hours post-operatively     Interval Problem Update  Reviewed last 24 hour events:  On high-flow oxygen until this am when switched to mask.   On room air 02 sats are 84-85  Up into chair this am  Persistent low lung volumes by CXR but improved today  Walking this afternoon on 6L    Prior 24 hours  Extubated yesterday afternoon  Placed on high flow oxygen for sagging saturation  On epi, adding bryan for soft Bps  Receiving fluid for hypotension  Small lung volumes by CXR  Urine 750 out over night  Complains that it hurts to take deep breaths    Review of Systems  Review of Systems   HENT: Negative.    Eyes: Negative.    Respiratory: Positive for cough and shortness of breath.    Cardiovascular: Positive for chest pain.        Pleuritic chest pain   Gastrointestinal: Negative.    Genitourinary: Negative.    Musculoskeletal: Negative.    Skin: Negative.    Neurological: Positive for weakness.   Endo/Heme/Allergies: Negative.    Psychiatric/Behavioral: Negative.         Vital Signs for last 24 hours   Temp:  [37.1 °C (98.8 °F)-38 °C (100.4 °F)] 37.1 °C (98.8 °F)  Pulse:  [74-99] 89  Resp:  [10-34] 34    Hemodynamic parameters for last 24 hours  CVP:  [5 MM HG-85 MM HG] 85 MM HG    Respiratory       Physical Exam   Physical Exam   Constitutional: He is oriented to person, place, and time. He appears well-developed and well-nourished.   Morbidly obese   HENT:   Head: Normocephalic and atraumatic.   Eyes: Pupils are equal, round, and reactive to light. Conjunctivae are normal.   Neck: Neck supple.   Cardiovascular: Normal rate and regular rhythm.    Pulmonary/Chest: Breath sounds normal.   Modest cough effort   Abdominal: Soft. Bowel sounds are normal.   Musculoskeletal: Normal range of motion.   Neurological: He is alert and oriented to person, place,  and time. He has normal reflexes.   Skin: Skin is warm and dry.       Medications  Current Facility-Administered Medications   Medication Dose Route Frequency Provider Last Rate Last Dose   • SODIUM CHLORIDE 0.9 % IV SOLN            • furosemide (LASIX) injection 20 mg  20 mg Intravenous BID DIURETIC Graciela Street, A.P.N.   20 mg at 11/23/18 0800   • potassium chloride SA (Kdur) tablet 10 mEq  10 mEq Oral BID Graciela Street, A.P.N.   10 mEq at 11/23/18 0755   • diphenhydrAMINE (BENADRYL) tablet/capsule 25 mg  25 mg Oral Q6HRS PRN Graciela Street, A.P.N.       • insulin NPH (HUMULIN,NOVOLIN) injection 5 Units  5 Units Subcutaneous Q8HRS Graciela Duttont, A.P.N.   5 Units at 11/23/18 1456   • insulin lispro (HUMALOG) injection 3 Units  3 Units Subcutaneous TID AC Graciela Street, A.P.N.   Stopped at 11/22/18 1300   • insulin lispro (HUMALOG) injection 0-15 Units  0-15 Units Subcutaneous ACHS & 0200 Graciela [address], A.P.N.   Stopped at 11/23/18 1100   • HYDROcodone-acetaminophen (NORCO) 5-325 MG per tablet 1-2 Tab  1-2 Tab Oral Q4HRS PRN Mohan Verdin M.D.   2 Tab at 11/23/18 1217   • hydrocortisone 1 % cream   Topical PRN Mohan Verdin M.D.       • tramadol (ULTRAM) 50 MG tablet 50 mg  50 mg Oral Q6HRS PRN Graciela Street, A.P.N.   50 mg at 11/22/18 1914   • gabapentin (NEURONTIN) capsule 100 mg  100 mg Oral BID Graciela Street, A.P.N.   100 mg at 11/23/18 0502   • Respiratory Care per Protocol   Nebulization Continuous RT Graciela Street, A.P.N.       • NS infusion   Intravenous Continuous Graciela Street, A.P.N. 30 mL/hr at 11/22/18 0123     • Pharmacy Consult Request ...Pain Management Review 1 Each  1 Each Other PRN Graciela Street, A.P.N.       • senna-docusate (PERICOLACE or SENOKOT S) 8.6-50 MG per tablet 2 Tab  2 Tab Oral BID Graciela Street, A.P.N.   2 Tab at 11/23/18 0501    And   • polyethylene glycol/lytes (MIRALAX) PACKET 1 Packet  1 Packet Oral QDAY PRN Graciela Street, A.P.N.   1  Packet at 11/23/18 1217    And   • magnesium hydroxide (MILK OF MAGNESIA) suspension 30 mL  30 mL Oral QDAY PRN Graciela Street, A.P.N.        And   • bisacodyl (DULCOLAX) suppository 10 mg  10 mg Rectal QDAY PRN Graciela Street, A.P.N.       • electrolyte-A (PLASMALYTE-A) infusion   Intravenous PRN Graciela Street, A.P.N.   1,000 mL at 11/21/18 1938   • enoxaparin (LOVENOX) inj 40 mg  40 mg Subcutaneous QDAY Graciela Street, A.P.N.   40 mg at 11/22/18 1712   • mupirocin (BACTROBAN) 2 % ointment 1 Application  1 Application Topical BID Graciela Street, A.P.N.   1 Application at 11/23/18 0526   • metoprolol (LOPRESSOR) tablet 25 mg  25 mg Oral BID Graciela Street, A.P.N.   Stopped at 11/23/18 0600   • clopidogrel (PLAVIX) tablet 75 mg  75 mg Oral DAILY Graciela Street, A.P.N.   75 mg at 11/23/18 0501   • oxyCODONE immediate-release (ROXICODONE) tablet 5 mg  5 mg Oral Q3HRS PRN Graciela Street, A.P.N.   5 mg at 11/21/18 1839   • oxyCODONE immediate release (ROXICODONE) tablet 10 mg  10 mg Oral Q3HRS PRN Graciela Street, A.P.N.   10 mg at 11/23/18 0502   • ondansetron (ZOFRAN) syringe/vial injection 4 mg  4 mg Intravenous Q6HRS PRN Graciela Street, A.P.N.   4 mg at 11/21/18 1829    Or   • prochlorperazine (COMPAZINE) injection 10 mg  10 mg Intravenous Q6HRS PRN Graciela Street, A.P.N.        Or   • promethazine (PHENERGAN) suppository 25 mg  25 mg Rectal Q6HRS PRN Graciela Street, A.P.N.       • acetaminophen (TYLENOL) tablet 650 mg  650 mg Oral Q4HRS PRN Graciela Street, A.P.N.   650 mg at 11/22/18 2134    Or   • acetaminophen (TYLENOL) suppository 650 mg  650 mg Rectal Q4HRS PRN Graciela Street, A.P.N.       • mag hydrox-al hydrox-simeth (MAALOX PLUS ES or MYLANTA DS) suspension 30 mL  30 mL Oral Q4HRS PRN Graciela Street, A.P.N.       • fentaNYL (SUBLIMAZE) injection 50 mcg  50 mcg Intravenous Q3HRS PRN Graciela Street, A.P.N.   50 mcg at 11/22/18 0926   • aspirin EC (ECOTRIN) tablet 81 mg  81 mg  Oral DAILY Sd Ty M.D.   81 mg at 11/23/18 0501   • rosuvastatin (CRESTOR) tablet 20 mg  20 mg Oral Q EVENING Sd Ty M.D.   20 mg at 11/22/18 1712   • ALPRAZolam (XANAX) tablet 0.25 mg  0.25 mg Oral Q6HRS PRN ALISTAIR Miranda   0.25 mg at 11/22/18 2134       Fluids    Intake/Output Summary (Last 24 hours) at 11/23/18 1549  Last data filed at 11/23/18 1400   Gross per 24 hour   Intake           933.07 ml   Output             1795 ml   Net          -861.93 ml       Laboratory  Recent Labs      11/21/18   1457  11/21/18   1612  11/21/18   1642   ISTATAPH  7.409  7.382*  7.378*   ISTATAPCO2  31.9  35.3  36.2   ISTATAPO2  67  67  60*   ISTATATCO2  21  22  22   [id number]  93  93  90*   ISTATARTHCO3  20.2  21.0  21.3   ISTATARTBE  -3  -3  -3   ISTATTEMP  36.7 C  37.0 C  37.2 C   ISTATFIO2  60  50  40   ISTATSPEC  Arterial  Arterial  Arterial   ISTATAPHTC  7.414  7.382*  7.375*   [id number]  66  67  61*     Recent Labs      11/20/18 2246  11/21/18   1300  11/21/18   1715   TROPONINI  0.33*  1.59*  2.60*     Recent Labs      11/20/18   2246  11/21/18   1300   11/21/18   2210  11/22/18   0515  11/23/18   0500   SODIUM  138   --    --    --   138  133*   POTASSIUM  4.2   --    < >  4.3  4.5  4.4   CHLORIDE  107   --    --    --   109  105   CO2  22   --    --    --   21  19*   BUN  18   --    --    --   16  20   CREATININE  1.15   --    --    --   1.05  1.17   MAGNESIUM   --   2.2   --    --    --    --    CALCIUM  9.3   --    --    --   8.2*  8.8    < > = values in this interval not displayed.     Recent Labs      11/20/18 2246 11/22/18   0515  11/23/18   0500   ALTSGPT  17   --    --    ASTSGOT  18   --    --    ALKPHOSPHAT  97   --    --    TBILIRUBIN  0.9   --    --    GLUCOSE  102*  141*  135*     Recent Labs      11/20/18 2246 11/22/18   0515  11/23/18   0500   WBC  11.7*  13.5*  16.5*   NEUTSPOLYS  62.60   --    --    LYMPHOCYTES  21.00*   --    --    MONOCYTES  11.50   --    --     EOSINOPHILS  3.80   --    --    BASOPHILS  0.80   --    --    ASTSGOT  18   --    --    ALTSGPT  17   --    --    ALKPHOSPHAT  97   --    --    TBILIRUBIN  0.9   --    --      Recent Labs      11/20/18   1714   11/20/18   2246  11/21/18   0036  11/21/18   1300  11/22/18   0515  11/23/18   0500   RBC   --    --   5.11   --    --   4.16*  4.00*   HEMOGLOBIN   --    --   17.5   --    --   14.2  13.6*   HEMATOCRIT   --    --   50.0   --    --   41.1*  40.0*   PLATELETCT   --    < >  378   --   264  266  256   PROTHROMBTM   --    --    --    --   15.6*   --    --    APTT  70.4*   --    --   39.1*  24.5*   --    --    INR   --    --    --    --   1.23*   --    --     < > = values in this interval not displayed.       Imaging  X-Ray:  My impression is: low lung volumes bilaterally    Assessment/Plan  * NSTEMI (non-ST elevated myocardial infarction) ([facility])   Assessment & Plan    Now revascularized with CABG     Acute respiratory failure following trauma and surgery ([facility])   Assessment & Plan    Pt's high oxygen requirement largely driven by poor inspiratory effort with related poor lung volumes  - doing better with increased ambulation  - weaning oxygen     Essential hypertension- (present on admission)   Assessment & Plan    Blood pressure OK     Class 1 obesity due to excess calories without serious comorbidity with body mass index (BMI) of 30.0 to 30.9 in adult   Assessment & Plan    Chronic challenge for pt. Hi obesity also compromises his respiratory status          VTE:  Heparin  Ulcer: Not Indicated  Lines: Central Line  Ongoing indication addressed    I have performed a physical exam and reviewed and updated ROS and Plan today (11/23/2018). In review of yesterday's note (11/22/2018), there are no changes except as documented above.     Discussed patient condition and risk of morbidity and/or mortality with Hospitalist, RN, RT, Therapies and Pharmacy  The patient remains ill but overall improving

## 2018-11-23 NOTE — PROGRESS NOTES
Report received from NOC RN. Up in chair, IV SL. Vwires capped. 15L nonrebreather, 93%. Reports generalized itching and sternal pain. Pain medication given to pt and benadryl ordered.2+ generalized swelling on BLE. Lasix given. Reports BL N/T on upper and lower bilateral extremities. Pulses 2+ radial and pedal. Wife at bedside. POC discussed to include incision care, line discontinuation (eran and Vwires to be d/c'd later today by APN), oxygen weaning, IS, and ambulation. Also discussed, up to chair for all meals and insulin discontinuation when within range x2 consecutive FSBS.

## 2018-11-23 NOTE — THERAPY
Occupational Therapy Contact Note:    OT orders received, pt is POD 2 s/p OHS. Will round back as able.     Lilia Monreal, OTR/L  Pager: [phone]

## 2018-11-24 ENCOUNTER — APPOINTMENT (OUTPATIENT)
Dept: RADIOLOGY | Facility: MEDICAL CENTER | Age: 68
DRG: 233 | End: 2018-11-24
Attending: NURSE PRACTITIONER
Payer: COMMERCIAL

## 2018-11-24 LAB
ANION GAP SERPL CALC-SCNC: 10 MMOL/L (ref 0–11.9)
BUN SERPL-MCNC: 24 MG/DL (ref 8–22)
CALCIUM SERPL-MCNC: 8.9 MG/DL (ref 8.5–10.5)
CHLORIDE SERPL-SCNC: 97 MMOL/L (ref 96–112)
CO2 SERPL-SCNC: 24 MMOL/L (ref 20–33)
CREAT SERPL-MCNC: 1.16 MG/DL (ref 0.5–1.4)
EKG IMPRESSION: NORMAL
ERYTHROCYTE [DISTWIDTH] IN BLOOD BY AUTOMATED COUNT: 54.9 FL (ref 35.9–50)
GLUCOSE BLD-MCNC: 119 MG/DL (ref 65–99)
GLUCOSE BLD-MCNC: 122 MG/DL (ref 65–99)
GLUCOSE SERPL-MCNC: 127 MG/DL (ref 65–99)
HCT VFR BLD AUTO: 38.2 % (ref 42–52)
HGB BLD-MCNC: 12.6 G/DL (ref 14–18)
MCH RBC QN AUTO: 33.1 PG (ref 27–33)
MCHC RBC AUTO-ENTMCNC: 33 G/DL (ref 33.7–35.3)
MCV RBC AUTO: 100.3 FL (ref 81.4–97.8)
PLATELET # BLD AUTO: 266 K/UL (ref 164–446)
PMV BLD AUTO: 9.3 FL (ref 9–12.9)
POTASSIUM SERPL-SCNC: 3.7 MMOL/L (ref 3.6–5.5)
RBC # BLD AUTO: 3.81 M/UL (ref 4.7–6.1)
SODIUM SERPL-SCNC: 131 MMOL/L (ref 135–145)
WBC # BLD AUTO: 15.3 K/UL (ref 4.8–10.8)

## 2018-11-24 PROCEDURE — 700102 HCHG RX REV CODE 250 W/ 637 OVERRIDE(OP): Performed by: INTERNAL MEDICINE

## 2018-11-24 PROCEDURE — 700111 HCHG RX REV CODE 636 W/ 250 OVERRIDE (IP): Performed by: NURSE PRACTITIONER

## 2018-11-24 PROCEDURE — 80048 BASIC METABOLIC PNL TOTAL CA: CPT

## 2018-11-24 PROCEDURE — 700102 HCHG RX REV CODE 250 W/ 637 OVERRIDE(OP): Performed by: THORACIC SURGERY (CARDIOTHORACIC VASCULAR SURGERY)

## 2018-11-24 PROCEDURE — A9270 NON-COVERED ITEM OR SERVICE: HCPCS | Performed by: INTERNAL MEDICINE

## 2018-11-24 PROCEDURE — 71046 X-RAY EXAM CHEST 2 VIEWS: CPT

## 2018-11-24 PROCEDURE — 82962 GLUCOSE BLOOD TEST: CPT

## 2018-11-24 PROCEDURE — 94668 MNPJ CHEST WALL SBSQ: CPT

## 2018-11-24 PROCEDURE — A9270 NON-COVERED ITEM OR SERVICE: HCPCS | Performed by: NURSE PRACTITIONER

## 2018-11-24 PROCEDURE — 700105 HCHG RX REV CODE 258: Performed by: NURSE PRACTITIONER

## 2018-11-24 PROCEDURE — 770020 HCHG ROOM/CARE - TELE (206)

## 2018-11-24 PROCEDURE — 85027 COMPLETE CBC AUTOMATED: CPT

## 2018-11-24 PROCEDURE — A9270 NON-COVERED ITEM OR SERVICE: HCPCS | Performed by: THORACIC SURGERY (CARDIOTHORACIC VASCULAR SURGERY)

## 2018-11-24 PROCEDURE — 700102 HCHG RX REV CODE 250 W/ 637 OVERRIDE(OP): Performed by: NURSE PRACTITIONER

## 2018-11-24 RX ORDER — FUROSEMIDE 10 MG/ML
40 INJECTION INTRAMUSCULAR; INTRAVENOUS
Status: DISCONTINUED | OUTPATIENT
Start: 2018-11-24 | End: 2018-11-29 | Stop reason: HOSPADM

## 2018-11-24 RX ORDER — POTASSIUM CHLORIDE 20 MEQ/1
20 TABLET, EXTENDED RELEASE ORAL 2 TIMES DAILY
Status: DISCONTINUED | OUTPATIENT
Start: 2018-11-24 | End: 2018-11-29 | Stop reason: HOSPADM

## 2018-11-24 RX ORDER — AMIODARONE HYDROCHLORIDE 200 MG/1
400 TABLET ORAL TWICE DAILY
Status: DISCONTINUED | OUTPATIENT
Start: 2018-11-24 | End: 2018-11-29 | Stop reason: HOSPADM

## 2018-11-24 RX ORDER — POTASSIUM CHLORIDE 20 MEQ/1
40 TABLET, EXTENDED RELEASE ORAL ONCE
Status: COMPLETED | OUTPATIENT
Start: 2018-11-24 | End: 2018-11-24

## 2018-11-24 RX ADMIN — CLOPIDOGREL 75 MG: 75 TABLET, FILM COATED ORAL at 06:01

## 2018-11-24 RX ADMIN — POTASSIUM CHLORIDE 20 MEQ: 1500 TABLET, EXTENDED RELEASE ORAL at 17:51

## 2018-11-24 RX ADMIN — AMIODARONE HYDROCHLORIDE 400 MG: 200 TABLET ORAL at 08:05

## 2018-11-24 RX ADMIN — POTASSIUM CHLORIDE 10 MEQ: 1500 TABLET, EXTENDED RELEASE ORAL at 06:02

## 2018-11-24 RX ADMIN — FUROSEMIDE 20 MG: 10 INJECTION, SOLUTION INTRAMUSCULAR; INTRAVENOUS at 06:01

## 2018-11-24 RX ADMIN — STANDARDIZED SENNA CONCENTRATE AND DOCUSATE SODIUM 2 TABLET: 8.6; 5 TABLET, FILM COATED ORAL at 17:51

## 2018-11-24 RX ADMIN — FUROSEMIDE 40 MG: 10 INJECTION, SOLUTION INTRAMUSCULAR; INTRAVENOUS at 15:30

## 2018-11-24 RX ADMIN — HYDROCODONE BITARTRATE AND ACETAMINOPHEN 2 TABLET: 5; 325 TABLET ORAL at 17:51

## 2018-11-24 RX ADMIN — OXYCODONE HYDROCHLORIDE 10 MG: 10 TABLET ORAL at 10:52

## 2018-11-24 RX ADMIN — STANDARDIZED SENNA CONCENTRATE AND DOCUSATE SODIUM 2 TABLET: 8.6; 5 TABLET, FILM COATED ORAL at 06:02

## 2018-11-24 RX ADMIN — ENOXAPARIN SODIUM 40 MG: 100 INJECTION SUBCUTANEOUS at 17:50

## 2018-11-24 RX ADMIN — METOPROLOL TARTRATE 25 MG: 25 TABLET, FILM COATED ORAL at 17:51

## 2018-11-24 RX ADMIN — MUPIROCIN 1 APPLICATION: 20 OINTMENT TOPICAL at 06:03

## 2018-11-24 RX ADMIN — GABAPENTIN 100 MG: 100 CAPSULE ORAL at 17:51

## 2018-11-24 RX ADMIN — ASPIRIN 81 MG: 81 TABLET, COATED ORAL at 06:01

## 2018-11-24 RX ADMIN — AMIODARONE HYDROCHLORIDE 1 MG/MIN: 50 INJECTION, SOLUTION INTRAVENOUS at 00:16

## 2018-11-24 RX ADMIN — GABAPENTIN 100 MG: 100 CAPSULE ORAL at 06:01

## 2018-11-24 RX ADMIN — ROSUVASTATIN CALCIUM 20 MG: 10 TABLET, FILM COATED ORAL at 17:51

## 2018-11-24 RX ADMIN — OXYCODONE HYDROCHLORIDE 10 MG: 10 TABLET ORAL at 22:20

## 2018-11-24 RX ADMIN — DIPHENHYDRAMINE HCL 25 MG: 25 TABLET ORAL at 00:36

## 2018-11-24 RX ADMIN — MUPIROCIN 1 APPLICATION: 20 OINTMENT TOPICAL at 17:53

## 2018-11-24 RX ADMIN — POTASSIUM CHLORIDE 40 MEQ: 1500 TABLET, EXTENDED RELEASE ORAL at 10:52

## 2018-11-24 RX ADMIN — AMIODARONE HYDROCHLORIDE 150 MG: 1.5 INJECTION, SOLUTION INTRAVENOUS at 00:16

## 2018-11-24 RX ADMIN — AMIODARONE HYDROCHLORIDE 400 MG: 200 TABLET ORAL at 17:51

## 2018-11-24 RX ADMIN — HYDROCODONE BITARTRATE AND ACETAMINOPHEN 1 TABLET: 5; 325 TABLET ORAL at 08:06

## 2018-11-24 ASSESSMENT — PAIN SCALES - GENERAL
PAINLEVEL_OUTOF10: 8
PAINLEVEL_OUTOF10: 1
PAINLEVEL_OUTOF10: 10
PAINLEVEL_OUTOF10: 6
PAINLEVEL_OUTOF10: 3
PAINLEVEL_OUTOF10: 6
PAINLEVEL_OUTOF10: 4
PAINLEVEL_OUTOF10: 3
PAINLEVEL_OUTOF10: 1
PAINLEVEL_OUTOF10: 7
PAINLEVEL_OUTOF10: 3
PAINLEVEL_OUTOF10: 3
PAINLEVEL_OUTOF10: 8

## 2018-11-24 ASSESSMENT — ENCOUNTER SYMPTOMS
SHORTNESS OF BREATH: 1
CONSTITUTIONAL NEGATIVE: 1
PSYCHIATRIC NEGATIVE: 1
CARDIOVASCULAR NEGATIVE: 1
MYALGIAS: 0
GASTROINTESTINAL NEGATIVE: 1
MUSCULOSKELETAL NEGATIVE: 1
BACK PAIN: 0
COUGH: 1
WEAKNESS: 1
NEUROLOGICAL NEGATIVE: 1
EYES NEGATIVE: 1

## 2018-11-24 NOTE — PROGRESS NOTES
Critical Care Progress Note    Date of admission  11/19/2018    Chief Complaint  68 y.o. male admitted 11/19/2018 with s/p CABG    Hospital Course   Extubated in ICU about 4 hours post-operatively     Interval Problem Update  Reviewed last 24 hour events:  Weaned to  4 liters NC  Walking in room  Feels less SOB today  3 liters positive this hospital stay  A-fib overnight, started on amino, back to sinus now  Cardiac diet- tolerating    Prior 24 hours  On high-flow oxygen until this am when switched to mask.   On room air 02 sats are 84-85  Up into chair this am  Persistent low lung volumes by CXR but improved today  Walking this afternoon on 6L      Review of Systems  Review of Systems   HENT: Negative.    Eyes: Negative.    Respiratory: Positive for cough and shortness of breath.    Cardiovascular: Positive for chest pain.        Pleuritic chest pain   Gastrointestinal: Negative.    Genitourinary: Negative.    Musculoskeletal: Negative.    Skin: Negative.    Neurological: Positive for weakness.   Endo/Heme/Allergies: Negative.    Psychiatric/Behavioral: Negative.         Vital Signs for last 24 hours   Temp:  [37 °C (98.6 °F)-37.2 °C (98.9 °F)] 37.1 °C (98.8 °F)  Pulse:  [] 87  Resp:  [19-34] 20    Hemodynamic parameters for last 24 hours  CVP:  [6 MM HG-85 MM HG] 85 MM HG    Respiratory       Physical Exam   Physical Exam   Constitutional: He is oriented to person, place, and time. He appears well-developed and well-nourished.   Morbidly obese   HENT:   Head: Normocephalic and atraumatic.   Eyes: Pupils are equal, round, and reactive to light. Conjunctivae are normal.   Neck: Normal range of motion. Neck supple.   Cardiovascular: Normal rate and regular rhythm.    Pulmonary/Chest: Effort normal and breath sounds normal.   Modest cough effort   Abdominal: Soft. Bowel sounds are normal.   Musculoskeletal: Normal range of motion.   Neurological: He is alert and oriented to person, place, and time. He has normal  reflexes.   Skin: Skin is warm and dry.   Psychiatric: He has a normal mood and affect. His behavior is normal. Judgment and thought content normal.       Medications  Current Facility-Administered Medications   Medication Dose Route Frequency Provider Last Rate Last Dose   • amiodarone (CORDARONE) tablet 400 mg  400 mg Oral TWICE DAILY Graciela Street, A.P.N.   400 mg at 11/24/18 0805   • furosemide (LASIX) injection 20 mg  20 mg Intravenous BID DIURETIC Graciela Street, A.P.N.   20 mg at 11/24/18 0601   • potassium chloride SA (Kdur) tablet 10 mEq  10 mEq Oral BID Graciela Street, A.P.N.   10 mEq at 11/24/18 0602   • diphenhydrAMINE (BENADRYL) tablet/capsule 25 mg  25 mg Oral Q6HRS PRN Graciela Street, A.P.N.   25 mg at 11/24/18 0036   • amiodarone (CORDARONE) 450 mg in D5W 250 mL Infusion  0.5-1 mg/min Intravenous Continuous Graciela Street, A.P.N. 17 mL/hr at 11/24/18 0720 0.5 mg/min at 11/24/18 0720   • HYDROcodone-acetaminophen (NORCO) 5-325 MG per tablet 1-2 Tab  1-2 Tab Oral Q4HRS PRN Mohan Verdin M.D.   1 Tab at 11/24/18 0806   • hydrocortisone 1 % cream   Topical PRN Mohan Verdin M.D.       • tramadol (ULTRAM) 50 MG tablet 50 mg  50 mg Oral Q6HRS PRN Graciela Street, A.P.N.   50 mg at 11/22/18 1914   • gabapentin (NEURONTIN) capsule 100 mg  100 mg Oral BID Graciela Street, A.P.N.   100 mg at 11/24/18 0601   • Respiratory Care per Protocol   Nebulization Continuous RT Graciela Duttont, A.P.N.       • NS infusion   Intravenous Continuous Graciela Street, A.P.N. 30 mL/hr at 11/22/18 0123     • Pharmacy Consult Request ...Pain Management Review 1 Each  1 Each Other PRN Graciela Street, A.P.N.       • senna-docusate (PERICOLACE or SENOKOT S) 8.6-50 MG per tablet 2 Tab  2 Tab Oral BID Graciela Street, A.P.N.   2 Tab at 11/24/18 0602    And   • polyethylene glycol/lytes (MIRALAX) PACKET 1 Packet  1 Packet Oral QDAY PRN Graciela Street, A.P.N.   1 Packet at 11/23/18 1217    And   • magnesium  hydroxide (MILK OF MAGNESIA) suspension 30 mL  30 mL Oral QDAY PRN Graciela Street, A.P.N.        And   • bisacodyl (DULCOLAX) suppository 10 mg  10 mg Rectal QDAY PRN Graciela Street, A.P.N.       • electrolyte-A (PLASMALYTE-A) infusion   Intravenous PRN Graciela Street, A.P.N.   1,000 mL at 11/21/18 1938   • enoxaparin (LOVENOX) inj 40 mg  40 mg Subcutaneous QDAY Graciela Street, A.P.N.   40 mg at 11/23/18 1751   • mupirocin (BACTROBAN) 2 % ointment 1 Application  1 Application Topical BID Graciela Street, A.P.N.   1 Application at 11/24/18 0603   • metoprolol (LOPRESSOR) tablet 25 mg  25 mg Oral BID Graciela Street, A.P.N.   Stopped at 11/24/18 0600   • clopidogrel (PLAVIX) tablet 75 mg  75 mg Oral DAILY Graciela Street, A.P.N.   75 mg at 11/24/18 0601   • oxyCODONE immediate-release (ROXICODONE) tablet 5 mg  5 mg Oral Q3HRS PRN Graciela Street, A.P.N.   5 mg at 11/21/18 1839   • oxyCODONE immediate release (ROXICODONE) tablet 10 mg  10 mg Oral Q3HRS PRN Gracieal Street, A.P.N.   10 mg at 11/23/18 0502   • ondansetron (ZOFRAN) syringe/vial injection 4 mg  4 mg Intravenous Q6HRS PRN Graciela Street, A.P.N.   4 mg at 11/21/18 1829    Or   • prochlorperazine (COMPAZINE) injection 10 mg  10 mg Intravenous Q6HRS PRN Graciela Street, A.P.N.        Or   • promethazine (PHENERGAN) suppository 25 mg  25 mg Rectal Q6HRS PRN Graciela Street, A.P.N.       • acetaminophen (TYLENOL) tablet 650 mg  650 mg Oral Q4HRS PRN Graciela Street, A.P.N.   650 mg at 11/22/18 2134    Or   • acetaminophen (TYLENOL) suppository 650 mg  650 mg Rectal Q4HRS PRN Graciela Street, A.P.N.       • mag hydrox-al hydrox-simeth (MAALOX PLUS ES or MYLANTA DS) suspension 30 mL  30 mL Oral Q4HRS PRN Graciela Street, A.P.N.       • fentaNYL (SUBLIMAZE) injection 50 mcg  50 mcg Intravenous Q3HRS PRN Graciela Street, A.P.N.   50 mcg at 11/22/18 0926   • aspirin EC (ECOTRIN) tablet 81 mg  81 mg Oral DAILY Sd Ty M.D.   81 mg at  11/24/18 0601   • rosuvastatin (CRESTOR) tablet 20 mg  20 mg Oral Q EVENING Sd Ty M.D.   20 mg at 11/23/18 1747   • ALPRAZolam (XANAX) tablet 0.25 mg  0.25 mg Oral Q6HRS PRN ALISTAIR Miranda   0.25 mg at 11/22/18 2134       Fluids    Intake/Output Summary (Last 24 hours) at 11/24/18 0918  Last data filed at 11/24/18 0800   Gross per 24 hour   Intake           853.33 ml   Output             1810 ml   Net          -956.67 ml       Laboratory  Recent Labs      11/21/18   1457  11/21/18   1612  11/21/18   1642   ISTATAPH  7.409  7.382*  7.378*   ISTATAPCO2  31.9  35.3  36.2   ISTATAPO2  67  67  60*   ISTATATCO2  21  22  22   YHUTLEY0HBO  93  93  90*   ISTATARTHCO3  20.2  21.0  21.3   ISTATARTBE  -3  -3  -3   ISTATTEMP  36.7 C  37.0 C  37.2 C   ISTATFIO2  60  50  40   ISTATSPEC  Arterial  Arterial  Arterial   ISTATAPHTC  7.414  7.382*  7.375*   [id number]  66  67  61*     Recent Labs      11/21/18   1300  11/21/18   1715   TROPONINI  1.59*  2.60*     Recent Labs      11/21/18   1300   11/22/18   0515  11/23/18   0500  11/24/18   0623   SODIUM   --    --   138  133*  131*   POTASSIUM   --    < >  4.5  4.4  3.7   CHLORIDE   --    --   109  105  97   CO2   --    --   21  19*  24   BUN   --    --   16  20  24*   CREATININE   --    --   1.05  1.17  1.16   MAGNESIUM  2.2   --    --    --    --    CALCIUM   --    --   8.2*  8.8  8.9    < > = values in this interval not displayed.     Recent Labs      11/22/18   0515  11/23/18   0500  11/24/18   0623   GLUCOSE  141*  135*  127*     Recent Labs      11/22/18   0515  11/23/18   0500  11/24/18   0623   WBC  13.5*  16.5*  15.3*     Recent Labs      11/21/18   1300  11/22/18   0515  11/23/18   0500  11/24/18 0623   RBC   --   4.16*  4.00*  3.81*   HEMOGLOBIN   --   14.2  13.6*  12.6*   HEMATOCRIT   --   41.1*  40.0*  38.2*   PLATELETCT  264  266  256  266   PROTHROMBTM  15.6*   --    --    --    APTT  24.5*   --    --    --    INR  1.23*   --    --    --         Imaging  X-Ray:  My impression is: low lung volumes bilaterally    Assessment/Plan  * NSTEMI (non-ST elevated myocardial infarction) ([facility])   Assessment & Plan    Now revascularized with CABG     Acute respiratory failure following trauma and surgery ([facility])   Assessment & Plan    Pt's high oxygen requirement largely driven by poor inspiratory effort with related poor lung volumes  - doing better with increased ambulation  - weaning oxygen     Essential hypertension- (present on admission)   Assessment & Plan    Blood pressure OK     Class 1 obesity due to excess calories without serious comorbidity with body mass index (BMI) of 30.0 to 30.9 in adult   Assessment & Plan    Chronic challenge for pt. Hi obesity also compromises his respiratory status          VTE:  Heparin  Ulcer: Not Indicated  Lines: Central Line  Ongoing indication addressed    I have performed a physical exam and reviewed and updated ROS and Plan today (11/24/2018). In review of yesterday's note (11/23/2018), there are no changes except as documented above.     Discussed patient condition and risk of morbidity and/or mortality with Hospitalist, RN, RT, Therapies and Pharmacy  The patient remains ill but overall improving

## 2018-11-24 NOTE — CARE PLAN
Problem: Fluid Volume:  Goal: Will maintain balanced intake and output    Intervention: Monitor, educate, and encourage compliance with therapeutic intake of liquids  RN encouraged pt to increase water intake to help with secretions.      Problem: Post Op Day 3 CABG/Heart Valve replacement  Goal: Optimal care of the post op CABG/Heart Valve replacement post op day 3    Intervention: IS q 1 hour while awake and record best IS volume  Educated pt importance of use and set a goal to do when commercials come on tv.

## 2018-11-24 NOTE — PROGRESS NOTES
Called APN  pt going into Aifb, confirmed with EKG. Medication Ordered and placed in MAR. Pt was updated and educated.            Pt converted into SR at 0230.

## 2018-11-24 NOTE — PROGRESS NOTES
Monitor Summary: Normal Sinus 70s-80s from start of shift until converting into a-fib rate 120s-140s and then converting back into sinus after amio bolus and drip initiation at 0230. 0.2/0.1/0.4    12 hour chart check

## 2018-11-24 NOTE — PROGRESS NOTES
Cardiovascular Surgery Progress Note    Name: Romario Chaudhari  MRN: [id number]  : 1950  Admit Date: 2018  7:44 PM  Procedure:  Procedure(s) and Anesthesia Type:     * MULTIPLE CORONARY ARTERY BYPASS x3, RIGHT LEG ENDOSCOPIC VEIN HARVEST - General     * GOOD - General  3 Day Post-Op    Vitals:  Patient Vitals for the past 8 hrs:   Temp SpO2 O2 Delivery O2 (LPM) Pulse Resp NIBP   18 1343 - 93 % - 4 88 18 -   18 1200 37.1 °C (98.7 °F) 96 % Silicone Nasal Cannula 4 84 - 137/91   18 0800 37.1 °C (98.8 °F) 95 % Silicone Nasal Cannula 4 87 - 110/75   18 0713 - 94 % - 4 78 20 -     Temp (24hrs), Av.1 °C (98.7 °F), Min:37 °C (98.6 °F), Max:37.2 °C (98.9 °F)      Respiratory:    Respiration: 18, Pulse Oximetry: 93 %, O2 Daily Delivery Respiratory : Silicone Nasal Cannula     Chest Tube Drains:          Fluids:    Intake/Output Summary (Last 24 hours) at 18 1359  Last data filed at 18 1000   Gross per 24 hour   Intake           887.33 ml   Output             1785 ml   Net          -897.67 ml     Admit weight: Weight: 103.9 kg (229 lb 0.9 oz)  Current weight: Weight: 108.9 kg (240 lb 1.3 oz) (18 0558)    Labs:  Recent Labs      18   0515  18   0500  18   0623   WBC  13.5*  16.5*  15.3*   RBC  4.16*  4.00*  3.81*   HEMOGLOBIN  14.2  13.6*  12.6*   HEMATOCRIT  41.1*  40.0*  38.2*   MCV  98.8*  100.0*  100.3*   MCH  34.1*  34.0*  33.1*   MCHC  34.5  34.0  33.0*   RDW  51.7*  55.1*  54.9*   PLATELETCT  266  256  266   MPV  9.1  9.3  9.3         Recent Labs      18   0515  18   0500  18   0623   SODIUM  138  133*  131*   POTASSIUM  4.5  4.4  3.7   CHLORIDE  109  105  97   CO2  21  19*  24   GLUCOSE  141*  135*  127*   BUN  16  20  24*   CREATININE  1.05  1.17  1.16   CALCIUM  8.2*  8.8  8.9           Medications:  • amiodarone  400 mg     • furosemide  20 mg     • potassium chloride SA  10 mEq     • gabapentin  100 mg     • senna-docusate  2 Tab      • enoxaparin  40 mg     • mupirocin  1 Application     • metoprolol  25 mg     • clopidogrel  75 mg     • aspirin EC  81 mg     • rosuvastatin  20 mg          Ordered Medications:    ASA - Yes    Plavix - Yes    Post-operative Beta Blockers - Yes--    Ace Inhibitor - No; contraindicated because of Other normal ef    Statin - Yes          Central Line:  Type of line: TLC   Date of insertion: 11/21  Date of removal: see rn notes    Exam:   Review of Systems   Constitutional: Negative.    HENT: Negative.    Eyes: Negative.    Cardiovascular: Negative.    Gastrointestinal: Negative.    Genitourinary: Negative.    Musculoskeletal: Negative for back pain, joint pain and myalgias.   Skin: Negative.    Neurological: Negative.    Endo/Heme/Allergies: Negative.    Psychiatric/Behavioral: Negative.        Physical Exam   Constitutional: He is oriented to person, place, and time. He appears well-developed and well-nourished.   obese   HENT:   Head: Normocephalic.   Eyes: Pupils are equal, round, and reactive to light.   Neck: Normal range of motion. No JVD present.   Cardiovascular: Normal rate and regular rhythm.  Exam reveals no friction rub.    Pulmonary/Chest: He has decreased breath sounds in the right lower field and the left lower field.   Abdominal: Soft. Bowel sounds are normal. He exhibits no distension. There is no guarding.   Musculoskeletal: Normal range of motion.   Neurological: He is alert and oriented to person, place, and time.   Skin: Skin is warm and dry.   prevena to chest, [facility] site cdi   Psychiatric: He has a normal mood and affect.       Quality Measures:   Quality-Core Measures   Reviewed items::  EKG reviewed, Radiology images reviewed, Labs reviewed and Medications reviewed  Munoz catheter::  One or Two Days Post Surgery (Day of Surgery being Day 0)  Central line in place:  Vasopressors  DVT prophylaxis pharmacological::  Enoxaparin (Lovenox)  DVT prophylaxis - mechanical:  SCDs  Ulcer  Prophylaxis::  Yes  Assessed for rehabilitation services:  Patient was assess for and/or received rehabilitation services during this hospitalization      Assessment/Plan:  POD 1 Extubated, on HF NC.  CXR unchanged.  On Mod dose epi, CVP 3, +2.6L.  Episodes Hotn with coughing.  CT output min, no airleka.  PLAN:  Albumin bolus/caCl.  Add low dose bryan, wean down epi.  Discussed recent bronchitis/ABX with pulm--tx deferred to them.  Will start diuresis when BP allows. DC mediastinal CTs  POD 2 NSR, HDS off pressors, NC off high flow, feeling better today. +4L, edema.  Passing gas, no BM. PLAN:  DC wires/eran/foely.  Diurese.  AMB/IS  POD 3 HDS, afib last night, amio gtt and protocol started, now back in NSR. Diuresing well, lower O2 requirements, passing gas, no BM yet. PLAN: COntinue diuresis.  CHange to PO amio. 2 view CXR today.  Bowel protocol.  AMB/IS.    Active Hospital Problems    Diagnosis   • Acute respiratory failure following trauma and surgery ([facility]) [J95.821]     Priority: High     Pt on wean post-op CABG, Evaluated and re-evaluated me along with RT and nursing. He meets weaning parameters, he is awake, alert, moves all 4 extremities, able to lift his head off the bed, minimal secretions, on minimal pressor, good cough and gag.     I will give patient a trial of extubation.      • Essential hypertension [I10]     Priority: High   • NSTEMI (non-ST elevated myocardial infarction) ([facility]) [I21.4]   • Class 1 obesity due to excess calories without serious comorbidity with body mass index (BMI) of 30.0 to 30.9 in adult [E66.09, Z68.30]

## 2018-11-25 PROBLEM — J20.9 ACUTE BRONCHITIS: Status: ACTIVE | Noted: 2018-11-25

## 2018-11-25 LAB
ANION GAP SERPL CALC-SCNC: 9 MMOL/L (ref 0–11.9)
BUN SERPL-MCNC: 26 MG/DL (ref 8–22)
CALCIUM SERPL-MCNC: 8.8 MG/DL (ref 8.5–10.5)
CHLORIDE SERPL-SCNC: 98 MMOL/L (ref 96–112)
CO2 SERPL-SCNC: 27 MMOL/L (ref 20–33)
CREAT SERPL-MCNC: 1.15 MG/DL (ref 0.5–1.4)
ERYTHROCYTE [DISTWIDTH] IN BLOOD BY AUTOMATED COUNT: 53.8 FL (ref 35.9–50)
GLUCOSE SERPL-MCNC: 120 MG/DL (ref 65–99)
GRAM STN SPEC: NORMAL
HCT VFR BLD AUTO: 35.8 % (ref 42–52)
HGB BLD-MCNC: 12.5 G/DL (ref 14–18)
LV EJECT FRACT  99904: 60
LV EJECT FRACT MOD 2C 99903: 77.78
LV EJECT FRACT MOD 4C 99902: 66
MCH RBC QN AUTO: 34.4 PG (ref 27–33)
MCHC RBC AUTO-ENTMCNC: 34.9 G/DL (ref 33.7–35.3)
MCV RBC AUTO: 98.6 FL (ref 81.4–97.8)
PLATELET # BLD AUTO: 295 K/UL (ref 164–446)
PMV BLD AUTO: 9.5 FL (ref 9–12.9)
POTASSIUM SERPL-SCNC: 4 MMOL/L (ref 3.6–5.5)
RBC # BLD AUTO: 3.63 M/UL (ref 4.7–6.1)
SIGNIFICANT IND 70042: NORMAL
SITE SITE: NORMAL
SODIUM SERPL-SCNC: 134 MMOL/L (ref 135–145)
SOURCE SOURCE: NORMAL
WBC # BLD AUTO: 14.4 K/UL (ref 4.8–10.8)

## 2018-11-25 PROCEDURE — 700111 HCHG RX REV CODE 636 W/ 250 OVERRIDE (IP): Performed by: NURSE PRACTITIONER

## 2018-11-25 PROCEDURE — A9270 NON-COVERED ITEM OR SERVICE: HCPCS | Performed by: NURSE PRACTITIONER

## 2018-11-25 PROCEDURE — 97165 OT EVAL LOW COMPLEX 30 MIN: CPT

## 2018-11-25 PROCEDURE — 700101 HCHG RX REV CODE 250: Performed by: THORACIC SURGERY (CARDIOTHORACIC VASCULAR SURGERY)

## 2018-11-25 PROCEDURE — 700102 HCHG RX REV CODE 250 W/ 637 OVERRIDE(OP): Performed by: NURSE PRACTITIONER

## 2018-11-25 PROCEDURE — 87205 SMEAR GRAM STAIN: CPT

## 2018-11-25 PROCEDURE — G8987 SELF CARE CURRENT STATUS: HCPCS | Mod: CJ

## 2018-11-25 PROCEDURE — 80048 BASIC METABOLIC PNL TOTAL CA: CPT

## 2018-11-25 PROCEDURE — 700102 HCHG RX REV CODE 250 W/ 637 OVERRIDE(OP): Performed by: INTERNAL MEDICINE

## 2018-11-25 PROCEDURE — 94668 MNPJ CHEST WALL SBSQ: CPT

## 2018-11-25 PROCEDURE — 85027 COMPLETE CBC AUTOMATED: CPT

## 2018-11-25 PROCEDURE — 87070 CULTURE OTHR SPECIMN AEROBIC: CPT

## 2018-11-25 PROCEDURE — 700105 HCHG RX REV CODE 258: Performed by: INTERNAL MEDICINE

## 2018-11-25 PROCEDURE — 94760 N-INVAS EAR/PLS OXIMETRY 1: CPT

## 2018-11-25 PROCEDURE — 94640 AIRWAY INHALATION TREATMENT: CPT

## 2018-11-25 PROCEDURE — A9270 NON-COVERED ITEM OR SERVICE: HCPCS | Performed by: INTERNAL MEDICINE

## 2018-11-25 PROCEDURE — 700111 HCHG RX REV CODE 636 W/ 250 OVERRIDE (IP): Performed by: INTERNAL MEDICINE

## 2018-11-25 PROCEDURE — 770020 HCHG ROOM/CARE - TELE (206)

## 2018-11-25 PROCEDURE — G8988 SELF CARE GOAL STATUS: HCPCS | Mod: CI

## 2018-11-25 PROCEDURE — 700102 HCHG RX REV CODE 250 W/ 637 OVERRIDE(OP): Performed by: THORACIC SURGERY (CARDIOTHORACIC VASCULAR SURGERY)

## 2018-11-25 PROCEDURE — A9270 NON-COVERED ITEM OR SERVICE: HCPCS | Performed by: THORACIC SURGERY (CARDIOTHORACIC VASCULAR SURGERY)

## 2018-11-25 RX ORDER — OXYCODONE HYDROCHLORIDE 10 MG/1
10 TABLET ORAL ONCE
Status: COMPLETED | OUTPATIENT
Start: 2018-11-25 | End: 2018-11-25

## 2018-11-25 RX ORDER — IPRATROPIUM BROMIDE AND ALBUTEROL SULFATE 2.5; .5 MG/3ML; MG/3ML
3 SOLUTION RESPIRATORY (INHALATION)
Status: DISCONTINUED | OUTPATIENT
Start: 2018-11-25 | End: 2018-11-29 | Stop reason: HOSPADM

## 2018-11-25 RX ADMIN — ALPRAZOLAM 0.25 MG: 0.25 TABLET ORAL at 00:23

## 2018-11-25 RX ADMIN — GABAPENTIN 100 MG: 100 CAPSULE ORAL at 17:22

## 2018-11-25 RX ADMIN — STANDARDIZED SENNA CONCENTRATE AND DOCUSATE SODIUM 2 TABLET: 8.6; 5 TABLET, FILM COATED ORAL at 05:43

## 2018-11-25 RX ADMIN — AMPICILLIN SODIUM AND SULBACTAM SODIUM 3 G: 2; 1 INJECTION, POWDER, FOR SOLUTION INTRAMUSCULAR; INTRAVENOUS at 17:19

## 2018-11-25 RX ADMIN — AMIODARONE HYDROCHLORIDE 400 MG: 200 TABLET ORAL at 17:20

## 2018-11-25 RX ADMIN — ASPIRIN 81 MG: 81 TABLET, COATED ORAL at 05:25

## 2018-11-25 RX ADMIN — POTASSIUM CHLORIDE 20 MEQ: 1500 TABLET, EXTENDED RELEASE ORAL at 05:26

## 2018-11-25 RX ADMIN — OXYCODONE HYDROCHLORIDE 10 MG: 10 TABLET ORAL at 16:05

## 2018-11-25 RX ADMIN — METOPROLOL TARTRATE 25 MG: 25 TABLET, FILM COATED ORAL at 05:27

## 2018-11-25 RX ADMIN — OXYCODONE HYDROCHLORIDE 10 MG: 10 TABLET ORAL at 05:38

## 2018-11-25 RX ADMIN — FUROSEMIDE 40 MG: 10 INJECTION, SOLUTION INTRAMUSCULAR; INTRAVENOUS at 05:23

## 2018-11-25 RX ADMIN — STANDARDIZED SENNA CONCENTRATE AND DOCUSATE SODIUM 2 TABLET: 8.6; 5 TABLET, FILM COATED ORAL at 17:19

## 2018-11-25 RX ADMIN — MUPIROCIN 1 APPLICATION: 20 OINTMENT TOPICAL at 05:27

## 2018-11-25 RX ADMIN — IPRATROPIUM BROMIDE AND ALBUTEROL SULFATE 3 ML: .5; 3 SOLUTION RESPIRATORY (INHALATION) at 05:34

## 2018-11-25 RX ADMIN — POTASSIUM CHLORIDE 20 MEQ: 1500 TABLET, EXTENDED RELEASE ORAL at 17:18

## 2018-11-25 RX ADMIN — MUPIROCIN 1 APPLICATION: 20 OINTMENT TOPICAL at 17:20

## 2018-11-25 RX ADMIN — CLOPIDOGREL 75 MG: 75 TABLET, FILM COATED ORAL at 05:25

## 2018-11-25 RX ADMIN — HYDROCODONE BITARTRATE AND ACETAMINOPHEN 2 TABLET: 5; 325 TABLET ORAL at 14:17

## 2018-11-25 RX ADMIN — HYDROCODONE BITARTRATE AND ACETAMINOPHEN 2 TABLET: 5; 325 TABLET ORAL at 00:23

## 2018-11-25 RX ADMIN — OXYCODONE HYDROCHLORIDE 10 MG: 10 TABLET ORAL at 11:56

## 2018-11-25 RX ADMIN — ROSUVASTATIN CALCIUM 20 MG: 10 TABLET, FILM COATED ORAL at 17:17

## 2018-11-25 RX ADMIN — METOPROLOL TARTRATE 25 MG: 25 TABLET, FILM COATED ORAL at 17:21

## 2018-11-25 RX ADMIN — TRAMADOL HYDROCHLORIDE 50 MG: 50 TABLET, FILM COATED ORAL at 09:14

## 2018-11-25 RX ADMIN — AMIODARONE HYDROCHLORIDE 400 MG: 200 TABLET ORAL at 05:25

## 2018-11-25 RX ADMIN — FUROSEMIDE 40 MG: 10 INJECTION, SOLUTION INTRAMUSCULAR; INTRAVENOUS at 16:05

## 2018-11-25 RX ADMIN — ENOXAPARIN SODIUM 40 MG: 100 INJECTION SUBCUTANEOUS at 17:20

## 2018-11-25 RX ADMIN — OXYCODONE HYDROCHLORIDE 10 MG: 10 TABLET ORAL at 06:57

## 2018-11-25 RX ADMIN — AMPICILLIN SODIUM AND SULBACTAM SODIUM 3 G: 2; 1 INJECTION, POWDER, FOR SOLUTION INTRAMUSCULAR; INTRAVENOUS at 11:56

## 2018-11-25 RX ADMIN — GABAPENTIN 100 MG: 100 CAPSULE ORAL at 05:25

## 2018-11-25 ASSESSMENT — PAIN SCALES - GENERAL
PAINLEVEL_OUTOF10: 7
PAINLEVEL_OUTOF10: 5
PAINLEVEL_OUTOF10: 7
PAINLEVEL_OUTOF10: 5
PAINLEVEL_OUTOF10: 6
PAINLEVEL_OUTOF10: 10
PAINLEVEL_OUTOF10: 6
PAINLEVEL_OUTOF10: 6
PAINLEVEL_OUTOF10: 7
PAINLEVEL_OUTOF10: 3
PAINLEVEL_OUTOF10: 6
PAINLEVEL_OUTOF10: 4
PAINLEVEL_OUTOF10: 5
PAINLEVEL_OUTOF10: 7

## 2018-11-25 ASSESSMENT — COGNITIVE AND FUNCTIONAL STATUS - GENERAL
DRESSING REGULAR LOWER BODY CLOTHING: A LITTLE
HELP NEEDED FOR BATHING: A LITTLE
SUGGESTED CMS G CODE MODIFIER DAILY ACTIVITY: CJ
DAILY ACTIVITIY SCORE: 21
TOILETING: A LITTLE

## 2018-11-25 ASSESSMENT — ENCOUNTER SYMPTOMS
MYALGIAS: 0
WEAKNESS: 1
CONSTITUTIONAL NEGATIVE: 1
MUSCULOSKELETAL NEGATIVE: 1
PSYCHIATRIC NEGATIVE: 1
NEUROLOGICAL NEGATIVE: 1
CARDIOVASCULAR NEGATIVE: 1
COUGH: 1
EYES NEGATIVE: 1
SPUTUM PRODUCTION: 1
GASTROINTESTINAL NEGATIVE: 1
SHORTNESS OF BREATH: 1
BACK PAIN: 0

## 2018-11-25 ASSESSMENT — PATIENT HEALTH QUESTIONNAIRE - PHQ9
SUM OF ALL RESPONSES TO PHQ9 QUESTIONS 1 AND 2: 0
1. LITTLE INTEREST OR PLEASURE IN DOING THINGS: NOT AT ALL
2. FEELING DOWN, DEPRESSED, IRRITABLE, OR HOPELESS: NOT AT ALL

## 2018-11-25 ASSESSMENT — ACTIVITIES OF DAILY LIVING (ADL): TOILETING: INDEPENDENT

## 2018-11-25 NOTE — PROGRESS NOTES
Cardiovascular Surgery Progress Note    Name: Romario Chaudhari  MRN: [id number]  : 1950  Admit Date: 2018  7:44 PM  Procedure:  Procedure(s) and Anesthesia Type:     * MULTIPLE CORONARY ARTERY BYPASS x3, RIGHT LEG ENDOSCOPIC VEIN HARVEST - General     * GOOD - General  4Day Post-Op    Vitals:  Patient Vitals for the past 8 hrs:   Temp SpO2 O2 Delivery O2 (LPM) Pulse Resp BP NIBP   18 0800 36.9 °C (98.5 °F) 94 % Silicone Nasal Cannula 4 63 - - (!) 98/68   18 0700 - - - - 72 - - -   18 0600 - 91 % - - 80 - - 129/92   18 0526 - 97 % - 4 80 20 - -   18 0525 - - - - 77 - 129/92 -   18 0400 - 93 % - - 61 - - (!) 83/61     Temp (24hrs), Av.7 °C (98.1 °F), Min:36.1 °C (97 °F), Max:37.2 °C (99 °F)      Respiratory:    Respiration: 20, Pulse Oximetry: 94 %, O2 Daily Delivery Respiratory : Silicone Nasal Cannula     Chest Tube Drains:          Fluids:    Intake/Output Summary (Last 24 hours) at 18 1038  Last data filed at 18 0800   Gross per 24 hour   Intake             1094 ml   Output             3026 ml   Net            -1932 ml     Admit weight: Weight: 103.9 kg (229 lb 0.9 oz)  Current weight: Weight: 108.9 kg (240 lb 1.3 oz) (18 0558)    Labs:  Recent Labs      18   0500  18   0623  18   0515   WBC  16.5*  15.3*  14.4*   RBC  4.00*  3.81*  3.63*   HEMOGLOBIN  13.6*  12.6*  12.5*   HEMATOCRIT  40.0*  38.2*  35.8*   MCV  100.0*  100.3*  98.6*   MCH  34.0*  33.1*  34.4*   MCHC  34.0  33.0*  34.9   RDW  55.1*  54.9*  53.8*   PLATELETCT  256  266  295   MPV  9.3  9.3  9.5         Recent Labs      18   0500  18   0623  18   0515   SODIUM  133*  131*  134*   POTASSIUM  4.4  3.7  4.0   CHLORIDE  105  97  98   CO2  19*  24  27   GLUCOSE  135*  127*  120*   BUN  20  24*  26*   CREATININE  1.17  1.16  1.15   CALCIUM  8.8  8.9  8.8           Medications:  • ampicillin-sulbactam (UNASYN) IV  3 g     • amiodarone  400 mg     •  furosemide  40 mg     • potassium chloride SA  20 mEq     • gabapentin  100 mg     • senna-docusate  2 Tab     • enoxaparin  40 mg     • mupirocin  1 Application     • metoprolol  25 mg     • clopidogrel  75 mg     • aspirin EC  81 mg     • rosuvastatin  20 mg          Ordered Medications:    ASA - Yes    Plavix - Yes    Post-operative Beta Blockers - Yes--    Ace Inhibitor - No; contraindicated because of Other normal ef    Statin - Yes          Central Line:  Type of line: TLC   Date of insertion: 11/21  Date of removal: see rn notes    Exam:   Review of Systems   Constitutional: Negative.    HENT: Negative.    Eyes: Negative.    Cardiovascular: Negative.    Gastrointestinal: Negative.    Genitourinary: Negative.    Musculoskeletal: Negative for back pain, joint pain and myalgias.   Skin: Negative.    Neurological: Negative.    Endo/Heme/Allergies: Negative.    Psychiatric/Behavioral: Negative.        Physical Exam   Constitutional: He is oriented to person, place, and time. He appears well-developed and well-nourished.   obese   HENT:   Head: Normocephalic.   Eyes: Pupils are equal, round, and reactive to light.   Neck: Normal range of motion. No JVD present.   Cardiovascular: Normal rate and regular rhythm.  Exam reveals no friction rub.    Pulmonary/Chest: He has decreased breath sounds in the right lower field and the left lower field.   Abdominal: Soft. Bowel sounds are normal. He exhibits no distension. There is no guarding.   Musculoskeletal: Normal range of motion.   Neurological: He is alert and oriented to person, place, and time.   Skin: Skin is warm and dry.   prevena to chest, [facility] site cdi   Psychiatric: He has a normal mood and affect.       Quality Measures:   Quality-Core Measures   Reviewed items::  EKG reviewed, Radiology images reviewed, Labs reviewed and Medications reviewed  Munoz catheter::  One or Two Days Post Surgery (Day of Surgery being Day 0)  Central line in place:  Vasopressors  DVT  prophylaxis pharmacological::  Enoxaparin (Lovenox)  DVT prophylaxis - mechanical:  SCDs  Ulcer Prophylaxis::  Yes  Assessed for rehabilitation services:  Patient was assess for and/or received rehabilitation services during this hospitalization      Assessment/Plan:  POD 1 Extubated, on HF NC.  CXR unchanged.  On Mod dose epi, CVP 3, +2.6L.  Episodes Hotn with coughing.  CT output min, no airleka.  PLAN:  Albumin bolus/caCl.  Add low dose bryan, wean down epi.  Discussed recent bronchitis/ABX with pulm--tx deferred to them.  Will start diuresis when BP allows. DC mediastinal CTs  POD 2 NSR, HDS off pressors, NC off high flow, feeling better today. +4L, edema.  Passing gas, no BM. PLAN:  DC wires/eran/foely.  Diurese.  AMB/IS  POD 3 HDS, afib last night, amio gtt and protocol started, now back in NSR. Diuresing well, lower O2 requirements, passing gas, no BM yet. PLAN: COntinue diuresis.  CHange to PO amio. 2 view CXR today.  Bowel protocol.  AMB/IS.  POD 4 HDS, NSR, 4 L NC.  Bad coughing last night, but better this AM.  Had BM.  Prior PT note has recommended rehab, patient uncertain.  PLAN:  Continue diuresis and RT treatments. Started on ABX by pulm. PT to see patient again for rehab vs home eval.  CPM.     Active Hospital Problems    Diagnosis   • Acute respiratory failure following trauma and surgery ([facility]) [J95.821]     Priority: High     Pt on wean post-op CABG, Evaluated and re-evaluated me along with RT and nursing. He meets weaning parameters, he is awake, alert, moves all 4 extremities, able to lift his head off the bed, minimal secretions, on minimal pressor, good cough and gag.     I will give patient a trial of extubation.      • Essential hypertension [I10]     Priority: High   • NSTEMI (non-ST elevated myocardial infarction) ([facility]) [I21.4]   • Class 1 obesity due to excess calories without serious comorbidity with body mass index (BMI) of 30.0 to 30.9 in adult [E66.09, Z68.30]

## 2018-11-25 NOTE — THERAPY
"Occupational Therapy Evaluation completed.   Functional Status:  CGA for sit>stand; min A for functional transfers; SPV for LB dressing (simulated); min A for toilet transfer; SPV for grooming while standing  Plan of Care: Will benefit from Occupational Therapy 3 times per week  Discharge Recommendations:  Equipment: Shower Chair. Recommend home health or outpatient transitional care services for continued occupational therapy services    See \"Rehab Therapy-Acute\" Patient Summary Report for complete documentation.    OT eval completed on 67 YO M s/p CABGx3. Pt limited due to decreased functional mobility, activity tolerance and generalized weakness. Pt required reminders for sternal precautions during functional transfers. Pt with decreased eccentric control during functional transfers when sitting down. Pt's SO present and reports she will be able to provide assistance as needed upon d/c. Anticipate pt will d/c home with HH/outpatient therapy. Will continue to follow for acute OT services while in-house to continue increasing independence with functional transfers and BADLs.  "

## 2018-11-25 NOTE — PROGRESS NOTES
Bedside report received from SANKET Tavares. Patient resting sitting at edge of bed. Patient and wife updated on plan of care. Discussed plan for day.

## 2018-11-25 NOTE — CARE PLAN
Problem: Post Op Day 3 CABG/Heart Valve replacement  Goal: Optimal care of the post op CABG/Heart Valve replacement post op day 3  Outcome: PROGRESSING AS EXPECTED    Intervention: Daily Weights  Will obtain tomorrow AM during walk  Intervention: Shower daily and clean incisions twice daily with soap and water  Shower completed. Wound vac drsg still in place to incision.  Intervention: Ambulate, increasing the distance each time x 3 and before bed  Completed    Intervention: Consider removal of syed, chest tube and pacer wire if not already done  Already completed.      Problem: Pain Management  Goal: Pain level will decrease to patient's comfort goal  Outcome: PROGRESSING SLOWER THAN EXPECTED  C/o incisional chest pain especially after coughing. Verbalizes pain using 0-10 scale. Orders in place for pain medication. Medicated as needed. Non-pharmacologic options discussed and offered.

## 2018-11-25 NOTE — CARE PLAN
Problem: Post Op Day 3 CABG/Heart Valve replacement  Goal: Optimal care of the post op CABG/Heart Valve replacement post op day 3    Intervention: Daily Weights  completed  Intervention: Shower daily and clean incisions twice daily with soap and water  Shower completed. Incisions cleansed  Intervention: Up in chair for all meals  Sitting up at edge of bed. Does not like chair  Intervention: Ambulate, increasing the distance each time x 3 and before bed  Pt up and walking 200 ft  Intervention: IS q 1 hour while awake and record best IS volume  Best 1250  Intervention: Consider removal of syed, chest tube and pacer wire if not already done  All removed previously

## 2018-11-25 NOTE — CARE PLAN
Problem: Post Op Day 3 CABG/Heart Valve replacement  Goal: Optimal care of the post op CABG/Heart Valve replacement post op day 3    Intervention: IS q 1 hour while awake and record best IS volume  Encouraged use. Best IS volume 1300.

## 2018-11-25 NOTE — CARE PLAN
Problem: Post Op Day 4 CABG/Heart Valve Replacement  Goal: Optimal care of the Post Op CABG/Heart Valve replacement Post Op Day 4    Intervention: Daily Weights  Completed this am with Miriam  Intervention: Shower daily and clean incisions twice daily with soap and water  Completed this am with Miriam  Intervention: Up in chair for all meals  Complete.  Intervention: Ambulate, increasing the distance each time x 3 and before bed  Complete. Patient goes slightly farther than half a lap around the unit.  Intervention: IS q 1 hour while awake and record best IS volume  Complete. 1800  Intervention: Consider removal of syed, chest tube and pacer wire if not already done  Syed, chest tubes and pacer wires are removed.  Intervention: Discharge Education  Will reevaluate for discharge tomorrow, continuing to educate.

## 2018-11-25 NOTE — CARE PLAN
Problem: Hyperinflation:  Goal: Prevent or improve atelectasis    Intervention: Perform hyperinflation therapy as indicated by assessment  Respiratory Therapy Update        PEP/IS BID 60% IS 1800ml  1300 ml for 24hrs+ on IS  Stable IS values  Reassessed to BID  Pt participating in own therapy  Pt ambulating w PT daily    Interdisciplinary Plan of Care-Goals (Indications)  Hyperinflation Protocol Indications: Chest Trauma (Blunt, Penetrative, or Surgical) (11/24/18 2006)  Interdisciplinary Plan of Care-Outcomes   Hyperinflation Protocol Goals/Outcome: Increased Vital Capacity or Return to Pre-operative Values;Improvement in Repeat CXR;Improvement in Previously Absent or Diminished Breath Sounds;Absences of or Improvement in Signs of Atelectasis (11/24/18 2011)                      O2 (LPM): 2 (11/24/18 2100)  O2 Daily Delivery Respiratory : Silicone Nasal Cannula (11/24/18 2006)          Events/Summary/Plan: Resting on 2LNC (11/23/18 2825)

## 2018-11-25 NOTE — PROGRESS NOTES
Critical Care Progress Note    Date of admission  11/19/2018    Chief Complaint  68 y.o. male admitted 11/19/2018 with s/p CABG    Hospital Course   Extubated in ICU about 4 hours post-operatively     Interval Problem Update  Reviewed last 24 hour events:  Remains on 2-4 liters   Complains of large amount coughing last night with frothy sputum  This am has copious green/ yellow sputum  Asking about antibiotics for cough and sputum  Continued pleuritic chest pain with little improvement with oxycodone  T-max 37.2  No chills, no shakes  WBC 14 (decreasing)  CXR shows continued density left base that is unchanged (my read)    Weaned to  4 liters NC  Walking in room  Feels less SOB today  3 liters positive this hospital stay  A-fib overnight, started on amino, back to sinus now  Cardiac diet- tolerating          Review of Systems  Review of Systems   HENT: Negative.    Eyes: Negative.    Respiratory: Positive for cough, sputum production and shortness of breath.    Cardiovascular: Positive for chest pain.        Pleuritic chest pain   Gastrointestinal: Negative.    Genitourinary: Negative.    Musculoskeletal: Negative.    Skin: Negative.    Neurological: Positive for weakness.   Endo/Heme/Allergies: Negative.    Psychiatric/Behavioral: Negative.         Vital Signs for last 24 hours   Temp:  [36.1 °C (97 °F)-37.2 °C (99 °F)] 37.2 °C (99 °F)  Pulse:  [61-92] 80  Resp:  [12-20] 20  BP: (129)/(92) 129/92    Hemodynamic parameters for last 24 hours       Respiratory       Physical Exam   Physical Exam   Constitutional: He is oriented to person, place, and time. He appears well-developed and well-nourished.   Morbidly obese   HENT:   Head: Normocephalic and atraumatic.   Eyes: Pupils are equal, round, and reactive to light. Conjunctivae are normal.   Neck: Normal range of motion. Neck supple.   Cardiovascular: Normal rate and regular rhythm.    Pulmonary/Chest: Effort normal and breath sounds normal.   Modest cough effort    Abdominal: Soft. Bowel sounds are normal.   Musculoskeletal: Normal range of motion.   Neurological: He is alert and oriented to person, place, and time. He has normal reflexes.   Skin: Skin is warm and dry.   Psychiatric: He has a normal mood and affect. His behavior is normal. Judgment and thought content normal.       Medications  Current Facility-Administered Medications   Medication Dose Route Frequency Provider Last Rate Last Dose   • ipratropium-albuterol (DUONEB) nebulizer solution  3 mL Nebulization Q4H PRN (RT) Mohan Verdin M.D.   3 mL at 11/25/18 0534   • amiodarone (CORDARONE) tablet 400 mg  400 mg Oral TWICE DAILY Graciela Street, A.P.N.   400 mg at 11/25/18 0525   • furosemide (LASIX) injection 40 mg  40 mg Intravenous BID DIURETIC Graciela Street, A.P.N.   40 mg at 11/25/18 0523   • potassium chloride SA (Kdur) tablet 20 mEq  20 mEq Oral BID Graciela Street, A.P.N.   20 mEq at 11/25/18 0526   • diphenhydrAMINE (BENADRYL) tablet/capsule 25 mg  25 mg Oral Q6HRS PRN Graciela Street, A.P.N.   25 mg at 11/24/18 0036   • HYDROcodone-acetaminophen (NORCO) 5-325 MG per tablet 1-2 Tab  1-2 Tab Oral Q4HRS PRN Mohan Verdin M.D.   2 Tab at 11/25/18 0023   • hydrocortisone 1 % cream   Topical PRN Mohan Verdin M.D.       • tramadol (ULTRAM) 50 MG tablet 50 mg  50 mg Oral Q6HRS PRN Graciela Street, A.P.N.   50 mg at 11/22/18 1914   • gabapentin (NEURONTIN) capsule 100 mg  100 mg Oral BID Graciela Street, A.P.N.   100 mg at 11/25/18 0525   • Respiratory Care per Protocol   Nebulization Continuous RT Graciela Street, A.P.N.       • NS infusion   Intravenous Continuous Graciela Street, A.P.N. 30 mL/hr at 11/22/18 0123     • Pharmacy Consult Request ...Pain Management Review 1 Each  1 Each Other PRN Graciela Street, JANINE.P.N.       • senna-docusate (PERICOLACE or SENOKOT S) 8.6-50 MG per tablet 2 Tab  2 Tab Oral BID JANINE Johnson.P.N.   2 Tab at 11/25/18 0543    And   • polyethylene  glycol/lytes (MIRALAX) PACKET 1 Packet  1 Packet Oral QDAY PRN Graciela Street, A.P.N.   1 Packet at 11/23/18 1217    And   • magnesium hydroxide (MILK OF MAGNESIA) suspension 30 mL  30 mL Oral QDAY PRN Graciela Street, A.P.N.        And   • bisacodyl (DULCOLAX) suppository 10 mg  10 mg Rectal QDAY PRN Graciela Street, A.P.N.       • electrolyte-A (PLASMALYTE-A) infusion   Intravenous PRN Graciela Street, A.P.N.   1,000 mL at 11/21/18 1938   • enoxaparin (LOVENOX) inj 40 mg  40 mg Subcutaneous QDAY Graciela Street, A.P.N.   40 mg at 11/24/18 1750   • mupirocin (BACTROBAN) 2 % ointment 1 Application  1 Application Topical BID Graciela Street, A.P.N.   1 Application at 11/25/18 0527   • metoprolol (LOPRESSOR) tablet 25 mg  25 mg Oral BID Graciela Street, A.P.N.   25 mg at 11/25/18 0527   • clopidogrel (PLAVIX) tablet 75 mg  75 mg Oral DAILY Graciela Street, A.P.N.   75 mg at 11/25/18 0525   • oxyCODONE immediate-release (ROXICODONE) tablet 5 mg  5 mg Oral Q3HRS PRN Graciela Street, A.P.N.   5 mg at 11/21/18 1839   • oxyCODONE immediate release (ROXICODONE) tablet 10 mg  10 mg Oral Q3HRS PRN Graciela Street, A.P.N.   10 mg at 11/25/18 0538   • ondansetron (ZOFRAN) syringe/vial injection 4 mg  4 mg Intravenous Q6HRS PRN Graciela Street, A.P.N.   4 mg at 11/21/18 1829    Or   • prochlorperazine (COMPAZINE) injection 10 mg  10 mg Intravenous Q6HRS PRN Graciela Street, A.P.N.        Or   • promethazine (PHENERGAN) suppository 25 mg  25 mg Rectal Q6HRS PRN Graciela Street, A.P.N.       • acetaminophen (TYLENOL) tablet 650 mg  650 mg Oral Q4HRS PRN Graciela Street, A.P.N.   650 mg at 11/22/18 2134    Or   • acetaminophen (TYLENOL) suppository 650 mg  650 mg Rectal Q4HRS PRN Graciela Street, A.P.N.       • mag hydrox-al hydrox-simeth (MAALOX PLUS ES or MYLANTA DS) suspension 30 mL  30 mL Oral Q4HRS PRN Graciela Street, A.P.N.       • fentaNYL (SUBLIMAZE) injection 50 mcg  50 mcg Intravenous Q3HRS PRN Graciela MCKINLEY  Jad AVyPVyN.   50 mcg at 11/22/18 0926   • aspirin EC (ECOTRIN) tablet 81 mg  81 mg Oral DAILY Sd Ty M.D.   81 mg at 11/25/18 0525   • rosuvastatin (CRESTOR) tablet 20 mg  20 mg Oral Q EVENING Sd Ty M.D.   20 mg at 11/24/18 1751   • ALPRAZolam (XANAX) tablet 0.25 mg  0.25 mg Oral Q6HRS PRN JANINE Miranda.P.NVy   0.25 mg at 11/25/18 0023       Fluids    Intake/Output Summary (Last 24 hours) at 11/25/18 0814  Last data filed at 11/25/18 0600   Gross per 24 hour   Intake              888 ml   Output             2901 ml   Net            -2013 ml       Laboratory          Recent Labs      11/23/18   0500  11/24/18   0623  11/25/18   0515   SODIUM  133*  131*  134*   POTASSIUM  4.4  3.7  4.0   CHLORIDE  105  97  98   CO2  19*  24  27   BUN  20  24*  26*   CREATININE  1.17  1.16  1.15   CALCIUM  8.8  8.9  8.8     Recent Labs      11/23/18   0500  11/24/18   0623  11/25/18   0515   GLUCOSE  135*  127*  120*     Recent Labs      11/23/18   0500  11/24/18   0623  11/25/18   0515   WBC  16.5*  15.3*  14.4*     Recent Labs      11/23/18   0500  11/24/18   0623  11/25/18   0515   RBC  4.00*  3.81*  3.63*   HEMOGLOBIN  13.6*  12.6*  12.5*   HEMATOCRIT  40.0*  38.2*  35.8*   PLATELETCT  256  266  295       Imaging  X-Ray:  My impression is: low lung volumes bilaterally    Assessment/Plan  * NSTEMI (non-ST elevated myocardial infarction) ([facility])   Assessment & Plan    Now revascularized with CABG     Acute respiratory failure following trauma and surgery ([facility])   Assessment & Plan    Pt's high oxygen requirement largely driven by poor inspiratory effort with related poor lung volumes  - doing better with increased ambulation  - weaning oxygen    With increased mobility much better lung expansion both clinically and radiographically. Now down to 4 liters. Still opacity/ atalectasis at left base but improving  Continue increasing activity     Essential hypertension- (present on admission)   Assessment & Plan     Blood pressure OK     Class 1 obesity due to excess calories without serious comorbidity with body mass index (BMI) of 30.0 to 30.9 in adult   Assessment & Plan    Chronic challenge for pt. Hi obesity also compromises his respiratory status          VTE:  Heparin  Ulcer: Not Indicated  Lines: Central Line  Ongoing indication addressed    I have performed a physical exam and reviewed and updated ROS and Plan today (11/25/2018). In review of yesterday's note (11/24/2018), there are no changes except as documented above.     Discussed patient condition and risk of morbidity and/or mortality with Hospitalist, RN, RT, Therapies and Pharmacy  The patient remains ill but overall improving

## 2018-11-26 ENCOUNTER — APPOINTMENT (OUTPATIENT)
Dept: RADIOLOGY | Facility: MEDICAL CENTER | Age: 68
DRG: 233 | End: 2018-11-26
Attending: NURSE PRACTITIONER
Payer: COMMERCIAL

## 2018-11-26 PROBLEM — J18.9 PNEUMONIA DUE TO INFECTIOUS ORGANISM: Status: ACTIVE | Noted: 2018-11-26

## 2018-11-26 LAB
ACTION RANGE TRIGGERED IACRT: NO
ANION GAP SERPL CALC-SCNC: 8 MMOL/L (ref 0–11.9)
BASE EXCESS BLDA CALC-SCNC: 0 MMOL/L (ref -4–3)
BODY TEMPERATURE: ABNORMAL DEGREES
BUN SERPL-MCNC: 27 MG/DL (ref 8–22)
CA-I BLD ISE-SCNC: 1.02 MMOL/L (ref 1.1–1.3)
CA-I BLD ISE-SCNC: 1.21 MMOL/L (ref 1.1–1.3)
CALCIUM SERPL-MCNC: 9 MG/DL (ref 8.5–10.5)
CHLORIDE SERPL-SCNC: 97 MMOL/L (ref 96–112)
CO2 BLDA-SCNC: 25 MMOL/L (ref 20–33)
CO2 SERPL-SCNC: 30 MMOL/L (ref 20–33)
CREAT SERPL-MCNC: 1.04 MG/DL (ref 0.5–1.4)
ERYTHROCYTE [DISTWIDTH] IN BLOOD BY AUTOMATED COUNT: 54.4 FL (ref 35.9–50)
GLUCOSE BLD-MCNC: 120 MG/DL (ref 65–99)
GLUCOSE BLD-MCNC: 162 MG/DL (ref 65–99)
GLUCOSE SERPL-MCNC: 119 MG/DL (ref 65–99)
HCO3 BLDA-SCNC: 24.2 MMOL/L (ref 17–25)
HCT VFR BLD AUTO: 37.6 % (ref 42–52)
HGB BLD-MCNC: 12.4 G/DL (ref 14–18)
HOROWITZ INDEX BLDA+IHG-RTO: 328 MM[HG]
INST. QUALIFIED PATIENT IIQPT: YES
MCH RBC QN AUTO: 32.8 PG (ref 27–33)
MCHC RBC AUTO-ENTMCNC: 33 G/DL (ref 33.7–35.3)
MCV RBC AUTO: 99.5 FL (ref 81.4–97.8)
O2/TOTAL GAS SETTING VFR VENT: 80 %
PCO2 BLDA: 38.9 MMHG (ref 26–37)
PCO2 TEMP ADJ BLDA: 38.9 MMHG (ref 26–37)
PH BLDA: 7.4 [PH] (ref 7.4–7.5)
PH TEMP ADJ BLDA: 7.4 [PH] (ref 7.4–7.5)
PLATELET # BLD AUTO: 363 K/UL (ref 164–446)
PMV BLD AUTO: 9.3 FL (ref 9–12.9)
PO2 BLDA: 262 MMHG (ref 64–87)
PO2 TEMP ADJ BLDA: 262 MMHG (ref 64–87)
POTASSIUM BLD-SCNC: 4 MMOL/L (ref 3.6–5.5)
POTASSIUM BLD-SCNC: 4.9 MMOL/L (ref 3.6–5.5)
POTASSIUM SERPL-SCNC: 4.2 MMOL/L (ref 3.6–5.5)
RBC # BLD AUTO: 3.78 M/UL (ref 4.7–6.1)
SAO2 % BLDA: 100 % (ref 93–99)
SODIUM BLD-SCNC: 139 MMOL/L (ref 135–145)
SODIUM SERPL-SCNC: 135 MMOL/L (ref 135–145)
SPECIMEN DRAWN FROM PATIENT: ABNORMAL
WBC # BLD AUTO: 13.6 K/UL (ref 4.8–10.8)

## 2018-11-26 PROCEDURE — 97535 SELF CARE MNGMENT TRAINING: CPT

## 2018-11-26 PROCEDURE — A9270 NON-COVERED ITEM OR SERVICE: HCPCS | Performed by: NURSE PRACTITIONER

## 2018-11-26 PROCEDURE — A9270 NON-COVERED ITEM OR SERVICE: HCPCS | Performed by: THORACIC SURGERY (CARDIOTHORACIC VASCULAR SURGERY)

## 2018-11-26 PROCEDURE — 99233 SBSQ HOSP IP/OBS HIGH 50: CPT | Performed by: INTERNAL MEDICINE

## 2018-11-26 PROCEDURE — 700101 HCHG RX REV CODE 250: Performed by: THORACIC SURGERY (CARDIOTHORACIC VASCULAR SURGERY)

## 2018-11-26 PROCEDURE — 94668 MNPJ CHEST WALL SBSQ: CPT

## 2018-11-26 PROCEDURE — 80048 BASIC METABOLIC PNL TOTAL CA: CPT

## 2018-11-26 PROCEDURE — 97530 THERAPEUTIC ACTIVITIES: CPT

## 2018-11-26 PROCEDURE — 700111 HCHG RX REV CODE 636 W/ 250 OVERRIDE (IP): Performed by: NURSE PRACTITIONER

## 2018-11-26 PROCEDURE — 770020 HCHG ROOM/CARE - TELE (206)

## 2018-11-26 PROCEDURE — 94640 AIRWAY INHALATION TREATMENT: CPT

## 2018-11-26 PROCEDURE — 700102 HCHG RX REV CODE 250 W/ 637 OVERRIDE(OP): Performed by: THORACIC SURGERY (CARDIOTHORACIC VASCULAR SURGERY)

## 2018-11-26 PROCEDURE — A9270 NON-COVERED ITEM OR SERVICE: HCPCS | Performed by: INTERNAL MEDICINE

## 2018-11-26 PROCEDURE — 71045 X-RAY EXAM CHEST 1 VIEW: CPT

## 2018-11-26 PROCEDURE — 700102 HCHG RX REV CODE 250 W/ 637 OVERRIDE(OP): Performed by: NURSE PRACTITIONER

## 2018-11-26 PROCEDURE — 700102 HCHG RX REV CODE 250 W/ 637 OVERRIDE(OP): Performed by: INTERNAL MEDICINE

## 2018-11-26 PROCEDURE — 700111 HCHG RX REV CODE 636 W/ 250 OVERRIDE (IP): Performed by: INTERNAL MEDICINE

## 2018-11-26 PROCEDURE — 85027 COMPLETE CBC AUTOMATED: CPT

## 2018-11-26 PROCEDURE — 700105 HCHG RX REV CODE 258: Performed by: INTERNAL MEDICINE

## 2018-11-26 RX ORDER — POLYVINYL ALCOHOL 14 MG/ML
1 SOLUTION/ DROPS OPHTHALMIC
Status: DISCONTINUED | OUTPATIENT
Start: 2018-11-26 | End: 2018-11-29 | Stop reason: HOSPADM

## 2018-11-26 RX ADMIN — CLOPIDOGREL 75 MG: 75 TABLET, FILM COATED ORAL at 05:25

## 2018-11-26 RX ADMIN — AMPICILLIN SODIUM AND SULBACTAM SODIUM 3 G: 2; 1 INJECTION, POWDER, FOR SOLUTION INTRAMUSCULAR; INTRAVENOUS at 23:52

## 2018-11-26 RX ADMIN — OXYCODONE HYDROCHLORIDE 10 MG: 10 TABLET ORAL at 13:34

## 2018-11-26 RX ADMIN — METOPROLOL TARTRATE 25 MG: 25 TABLET, FILM COATED ORAL at 17:15

## 2018-11-26 RX ADMIN — AMIODARONE HYDROCHLORIDE 400 MG: 200 TABLET ORAL at 05:25

## 2018-11-26 RX ADMIN — STANDARDIZED SENNA CONCENTRATE AND DOCUSATE SODIUM 2 TABLET: 8.6; 5 TABLET, FILM COATED ORAL at 18:49

## 2018-11-26 RX ADMIN — IPRATROPIUM BROMIDE AND ALBUTEROL SULFATE 3 ML: .5; 3 SOLUTION RESPIRATORY (INHALATION) at 22:11

## 2018-11-26 RX ADMIN — POTASSIUM CHLORIDE 20 MEQ: 1500 TABLET, EXTENDED RELEASE ORAL at 18:46

## 2018-11-26 RX ADMIN — OXYCODONE HYDROCHLORIDE 10 MG: 10 TABLET ORAL at 07:23

## 2018-11-26 RX ADMIN — MUPIROCIN 1 APPLICATION: 20 OINTMENT TOPICAL at 06:00

## 2018-11-26 RX ADMIN — GABAPENTIN 100 MG: 100 CAPSULE ORAL at 05:25

## 2018-11-26 RX ADMIN — OXYCODONE HYDROCHLORIDE 10 MG: 10 TABLET ORAL at 10:37

## 2018-11-26 RX ADMIN — FUROSEMIDE 40 MG: 10 INJECTION, SOLUTION INTRAMUSCULAR; INTRAVENOUS at 05:26

## 2018-11-26 RX ADMIN — POTASSIUM CHLORIDE 20 MEQ: 1500 TABLET, EXTENDED RELEASE ORAL at 05:25

## 2018-11-26 RX ADMIN — HYDROCODONE BITARTRATE AND ACETAMINOPHEN 2 TABLET: 5; 325 TABLET ORAL at 23:52

## 2018-11-26 RX ADMIN — IPRATROPIUM BROMIDE AND ALBUTEROL SULFATE 3 ML: .5; 3 SOLUTION RESPIRATORY (INHALATION) at 00:55

## 2018-11-26 RX ADMIN — ROSUVASTATIN CALCIUM 20 MG: 10 TABLET, FILM COATED ORAL at 18:46

## 2018-11-26 RX ADMIN — AMIODARONE HYDROCHLORIDE 400 MG: 200 TABLET ORAL at 17:15

## 2018-11-26 RX ADMIN — AMPICILLIN SODIUM AND SULBACTAM SODIUM 3 G: 2; 1 INJECTION, POWDER, FOR SOLUTION INTRAMUSCULAR; INTRAVENOUS at 01:03

## 2018-11-26 RX ADMIN — FUROSEMIDE 40 MG: 10 INJECTION, SOLUTION INTRAMUSCULAR; INTRAVENOUS at 17:14

## 2018-11-26 RX ADMIN — HYDROCODONE BITARTRATE AND ACETAMINOPHEN 2 TABLET: 5; 325 TABLET ORAL at 05:37

## 2018-11-26 RX ADMIN — DIPHENHYDRAMINE HCL 25 MG: 25 TABLET ORAL at 23:52

## 2018-11-26 RX ADMIN — METOPROLOL TARTRATE 25 MG: 25 TABLET, FILM COATED ORAL at 06:00

## 2018-11-26 RX ADMIN — AMPICILLIN SODIUM AND SULBACTAM SODIUM 3 G: 2; 1 INJECTION, POWDER, FOR SOLUTION INTRAMUSCULAR; INTRAVENOUS at 17:14

## 2018-11-26 RX ADMIN — ONDANSETRON HYDROCHLORIDE 4 MG: 2 INJECTION, SOLUTION INTRAMUSCULAR; INTRAVENOUS at 07:23

## 2018-11-26 RX ADMIN — OXYCODONE HYDROCHLORIDE 10 MG: 10 TABLET ORAL at 22:51

## 2018-11-26 RX ADMIN — ENOXAPARIN SODIUM 40 MG: 100 INJECTION SUBCUTANEOUS at 17:14

## 2018-11-26 RX ADMIN — GABAPENTIN 100 MG: 100 CAPSULE ORAL at 17:15

## 2018-11-26 RX ADMIN — AMPICILLIN SODIUM AND SULBACTAM SODIUM 3 G: 2; 1 INJECTION, POWDER, FOR SOLUTION INTRAMUSCULAR; INTRAVENOUS at 10:38

## 2018-11-26 RX ADMIN — STANDARDIZED SENNA CONCENTRATE AND DOCUSATE SODIUM 2 TABLET: 8.6; 5 TABLET, FILM COATED ORAL at 05:24

## 2018-11-26 RX ADMIN — OXYCODONE HYDROCHLORIDE 5 MG: 5 TABLET ORAL at 17:15

## 2018-11-26 RX ADMIN — HYDROCODONE BITARTRATE AND ACETAMINOPHEN 2 TABLET: 5; 325 TABLET ORAL at 01:07

## 2018-11-26 RX ADMIN — AMPICILLIN SODIUM AND SULBACTAM SODIUM 3 G: 2; 1 INJECTION, POWDER, FOR SOLUTION INTRAMUSCULAR; INTRAVENOUS at 05:23

## 2018-11-26 RX ADMIN — ASPIRIN 81 MG: 81 TABLET, COATED ORAL at 06:00

## 2018-11-26 ASSESSMENT — ENCOUNTER SYMPTOMS
COUGH: 1
GASTROINTESTINAL NEGATIVE: 1
PSYCHIATRIC NEGATIVE: 1
CARDIOVASCULAR NEGATIVE: 1
NEUROLOGICAL NEGATIVE: 1
WEAKNESS: 1
CONSTITUTIONAL NEGATIVE: 1
SHORTNESS OF BREATH: 1
MYALGIAS: 0
EYES NEGATIVE: 1
BACK PAIN: 0
SPUTUM PRODUCTION: 1

## 2018-11-26 ASSESSMENT — PAIN SCALES - GENERAL
PAINLEVEL_OUTOF10: 6
PAINLEVEL_OUTOF10: 7
PAINLEVEL_OUTOF10: 6
PAINLEVEL_OUTOF10: 5
PAINLEVEL_OUTOF10: 5
PAINLEVEL_OUTOF10: 6
PAINLEVEL_OUTOF10: 7
PAINLEVEL_OUTOF10: 6
PAINLEVEL_OUTOF10: 4
PAINLEVEL_OUTOF10: 4
PAINLEVEL_OUTOF10: 6
PAINLEVEL_OUTOF10: 7
PAINLEVEL_OUTOF10: 3
PAINLEVEL_OUTOF10: 7
PAINLEVEL_OUTOF10: 2
PAINLEVEL_OUTOF10: 2
PAINLEVEL_OUTOF10: 5
PAINLEVEL_OUTOF10: 6

## 2018-11-26 ASSESSMENT — COGNITIVE AND FUNCTIONAL STATUS - GENERAL
WALKING IN HOSPITAL ROOM: A LITTLE
MOBILITY SCORE: 18
SUGGESTED CMS G CODE MODIFIER MOBILITY: CK
TURNING FROM BACK TO SIDE WHILE IN FLAT BAD: A LITTLE
CLIMB 3 TO 5 STEPS WITH RAILING: A LITTLE
MOVING TO AND FROM BED TO CHAIR: A LITTLE
MOVING FROM LYING ON BACK TO SITTING ON SIDE OF FLAT BED: A LITTLE
STANDING UP FROM CHAIR USING ARMS: A LITTLE

## 2018-11-26 ASSESSMENT — GAIT ASSESSMENTS
DISTANCE (FEET): 350
GAIT LEVEL OF ASSIST: STAND BY ASSIST
ASSISTIVE DEVICE: FRONT WHEEL WALKER

## 2018-11-26 NOTE — ASSESSMENT & PLAN NOTE
On antibx  Sputum shows multiple organisms, but not MRSA or pneudomonas, so on unasyn  Follow CXR  Start following CRP/PCT for next few days  Should do well  PCT 0.14, so switch to augementin to finish course  4 more days antibx

## 2018-11-26 NOTE — CARE PLAN
Problem: Safety  Goal: Will remain free from injury  Outcome: PROGRESSING AS EXPECTED  Treaded socks on, bed in lowest position, personal belongings within reach, patient educated to use call light, and lower bed rails up.     Problem: Bowel/Gastric:  Goal: Normal bowel function is maintained or improved  Outcome: PROGRESSING AS EXPECTED  Patient's last bowel movement 11/24/2018

## 2018-11-26 NOTE — PROGRESS NOTES
Cardiovascular Surgery Progress Note    Name: Romario Chaudhari  MRN: [id number]  : 1950  Admit Date: 2018  7:44 PM  Procedure:  Procedure(s) and Anesthesia Type:     * MULTIPLE CORONARY ARTERY BYPASS x3, RIGHT LEG ENDOSCOPIC VEIN HARVEST - General     * GOOD - General  5 Day Post-Op    Vitals:  Patient Vitals for the past 8 hrs:   Temp SpO2 O2 Delivery O2 (LPM) Pulse Heart Rate (Monitored) Resp BP NIBP   18 0800 36.8 °C (98.3 °F) 92 % Silicone Nasal Cannula 2 66 66 16 103/68 -   18 0750 - 90 % - - 71 - - - -   18 0615 - 94 % - - 71 - - - -   18 0600 - 94 % - - 75 - - - -   18 0545 - 95 % - - 71 - - - -   18 0530 - 95 % - - 67 - - - 149/88   18 0515 - 95 % - - 63 - - - -   18 0415 - 95 % - - 67 - - - -   18 0400 37.1 °C (98.8 °F) 95 % Silicone Nasal Cannula 2 63 - - - 117/76   18 0345 - 96 % - - 63 - - - -   18 0330 - 97 % - - 64 - - - -   18 0315 - 94 % - - 62 - - - -     Temp (24hrs), Av.1 °C (98.8 °F), Min:36.8 °C (98.3 °F), Max:37.7 °C (99.8 °F)      Respiratory:    Respiration: 16, Pulse Oximetry: 92 %, O2 Daily Delivery Respiratory : Silicone Nasal Cannula     Chest Tube Drains:          Fluids:    Intake/Output Summary (Last 24 hours) at 18 1106  Last data filed at 18 1000   Gross per 24 hour   Intake          1866.67 ml   Output             2800 ml   Net          -933.33 ml     Admit weight: Weight: 103.9 kg (229 lb 0.9 oz)  Current weight: Weight: 108.9 kg (240 lb 1.3 oz) (18 0558)    Labs:  Recent Labs      18   0530   WBC  15.3*  14.4*  13.6*   RBC  3.81*  3.63*  3.78*   HEMOGLOBIN  12.6*  12.5*  12.4*   HEMATOCRIT  38.2*  35.8*  37.6*   MCV  100.3*  98.6*  99.5*   MCH  33.1*  34.4*  32.8   MCHC  33.0*  34.9  33.0*   RDW  54.9*  53.8*  54.4*   PLATELETCT  266  295  363   MPV  9.3  9.5  9.3         Recent Labs      18   0530    SODIUM  131*  134*  135   POTASSIUM  3.7  4.0  4.2   CHLORIDE  97  98  97   CO2  24  27  30   GLUCOSE  127*  120*  119*   BUN  24*  26*  27*   CREATININE  1.16  1.15  1.04   CALCIUM  8.9  8.8  9.0           Medications:  • ampicillin-sulbactam (UNASYN) IV  3 g 3 g (11/26/18 1038)   • amiodarone  400 mg     • furosemide  40 mg     • potassium chloride SA  20 mEq     • gabapentin  100 mg     • senna-docusate  2 Tab     • enoxaparin  40 mg     • metoprolol  25 mg     • clopidogrel  75 mg     • aspirin EC  81 mg     • rosuvastatin  20 mg          Ordered Medications:    ASA - Yes    Plavix - Yes    Post-operative Beta Blockers - Yes--    Ace Inhibitor - No; contraindicated because of Other normal ef    Statin - Yes          Central Line:  Type of line: TLC   Date of insertion: 11/21  Date of removal: see rn notes    Exam:   Review of Systems   Constitutional: Negative.    HENT: Negative.    Eyes: Negative.    Cardiovascular: Negative.    Gastrointestinal: Negative.    Genitourinary: Negative.    Musculoskeletal: Negative for back pain, joint pain and myalgias.   Skin: Negative.    Neurological: Negative.    Endo/Heme/Allergies: Negative.    Psychiatric/Behavioral: Negative.        Physical Exam   Constitutional: He is oriented to person, place, and time. He appears well-developed and well-nourished.   obese   HENT:   Head: Normocephalic.   Eyes: Pupils are equal, round, and reactive to light.   Neck: Normal range of motion. No JVD present.   Cardiovascular: Normal rate and regular rhythm.  Exam reveals no friction rub.    Pulmonary/Chest: He has decreased breath sounds in the right lower field and the left lower field.   Abdominal: Soft. Bowel sounds are normal. He exhibits no distension. There is no guarding.   Musculoskeletal: Normal range of motion.   Neurological: He is alert and oriented to person, place, and time.   Skin: Skin is warm and dry.   prevena to chest, evh site cdi   Psychiatric: He has a normal mood  and affect.       Quality Measures:   Quality-Core Measures   Reviewed items::  EKG reviewed, Radiology images reviewed, Labs reviewed and Medications reviewed  Munoz catheter::  One or Two Days Post Surgery (Day of Surgery being Day 0)  Central line in place:  Vasopressors  DVT prophylaxis pharmacological::  Enoxaparin (Lovenox)  DVT prophylaxis - mechanical:  SCDs  Ulcer Prophylaxis::  Yes  Assessed for rehabilitation services:  Patient was assess for and/or received rehabilitation services during this hospitalization      Assessment/Plan:  POD 1 Extubated, on HF NC.  CXR unchanged.  On Mod dose epi, CVP 3, +2.6L.  Episodes Hotn with coughing.  CT output min, no airleka.  PLAN:  Albumin bolus/caCl.  Add low dose bryan, wean down epi.  Discussed recent bronchitis/ABX with pulm--tx deferred to them.  Will start diuresis when BP allows. DC mediastinal CTs  POD 2 NSR, HDS off pressors, NC off high flow, feeling better today. +4L, edema.  Passing gas, no BM. PLAN:  DC wires/eran/foely.  Diurese.  AMB/IS  POD 3 HDS, afib last night, amio gtt and protocol started, now back in NSR. Diuresing well, lower O2 requirements, passing gas, no BM yet. PLAN: COntinue diuresis.  CHange to PO amio. 2 view CXR today.  Bowel protocol.  AMB/IS.  POD 4 HDS, NSR, 4 L NC.  Bad coughing last night, but better this AM.  Had BM.  Prior PT note has recommended rehab, patient uncertain.  PLAN:  Continue diuresis and RT treatments. Started on ABX by pulm. PT to see patient again for rehab vs home eval.  CPM.   POD 5  HDS, SR, neuro intact, wounds CDI, abdomin S/NT + BM, fluid balance euvolemic, wt stable.  Plan:  PT to evaluate for home vs rehab today. BB,  No ACE EF normal, Statin, IS/ambulate. CPM.    Active Hospital Problems    Diagnosis   • Acute respiratory failure following trauma and surgery (HCC) [J95.821]     Priority: High     Pt on wean post-op CABG, Evaluated and re-evaluated me along with RT and nursing. He meets weaning parameters,  he is awake, alert, moves all 4 extremities, able to lift his head off the bed, minimal secretions, on minimal pressor, good cough and gag.     I will give patient a trial of extubation.      • Essential hypertension [I10]     Priority: High   • Acute bronchitis [J20.9]     Acute bronchitis with possible LLL pneumonia although no fever and without antibiotics WBC is decreasing. Because of the copious secretions I have started empiric Unasyn to treat hospital acquired bronchitis (5 day course, can change to oral in 1-2 days pending clinical course)  - sputum sent for culture  - encourage mobility     • NSTEMI (non-ST elevated myocardial infarction) ([facility]) [I21.4]   • Class 1 obesity due to excess calories without serious comorbidity with body mass index (BMI) of 30.0 to 30.9 in adult [E66.09, Z68.30]

## 2018-11-26 NOTE — PROGRESS NOTES
Bedside report received from Miriam COLES. Assumed care of patient.    2 RN Skin Assessment complete.

## 2018-11-26 NOTE — PROGRESS NOTES
Critical Care Progress Note    Date of admission  11/19/2018    Chief Complaint  68 y.o. male admitted 11/19/2018 with s/p CABG    Hospital Course   Extubated in ICU about 4 hours post-operatively  Appears to have developed a LLL pneumonia at this time  On unasyn; sputum with many WBC and many mixed organisms, but no MRSA or pseudomonas    Interval Problem Update  Reviewed last 24 hour events:  POD # 5 from CABG X 3  Fever ysesterday and productive cough  A&O X 4  Hemodynamically stable  IS of 1250   No syed; left SC central line  Right heal wound that wound care will see    Previous 24 hour events Interval update:  Remains on 2-4 liters   Complains of large amount coughing last night with frothy sputum  This am has copious green/ yellow sputum  Asking about antibiotics for cough and sputum  Continued pleuritic chest pain with little improvement with oxycodone  T-max 37.2  No chills, no shakes  WBC 14 (decreasing)  CXR shows continued density left base that is unchanged (my read)        Review of Systems  Review of Systems   Constitutional:        Did not sleep well last night   Respiratory: Positive for cough, sputum production and shortness of breath.    Cardiovascular: Positive for chest pain.        Pleuritic chest pain   Neurological: Positive for weakness.   All other systems reviewed and are negative.       Vital Signs for last 24 hours   Temp:  [36.9 °C (98.4 °F)-37.7 °C (99.8 °F)] 37.1 °C (98.8 °F)  Pulse:  [62-83] 67  Resp:  [18] 18  BP: (118)/(82) 118/82    Hemodynamic parameters for last 24 hours       Respiratory   4 liters NC    Physical Exam   Physical Exam   Constitutional: He is oriented to person, place, and time. He appears well-developed and well-nourished.   Morbidly obese   HENT:   Head: Normocephalic and atraumatic.   Eyes: Pupils are equal, round, and reactive to light. Conjunctivae are normal.   Neck: Normal range of motion. Neck supple.   Cardiovascular: Normal rate and regular rhythm.     Distant heart sounds, no M/G/R   Pulmonary/Chest: Effort normal and breath sounds normal. No respiratory distress. He has no wheezes. He has no rales.   Modest cough effort   Abdominal: Soft. Bowel sounds are normal. He exhibits no distension.   obese   Musculoskeletal: Normal range of motion. He exhibits no edema.   Neurological: He is alert and oriented to person, place, and time. He has normal reflexes.   Skin: Skin is warm and dry.   Psychiatric: He has a normal mood and affect. His behavior is normal. Judgment and thought content normal.       Medications  Current Facility-Administered Medications   Medication Dose Route Frequency Provider Last Rate Last Dose   • ipratropium-albuterol (DUONEB) nebulizer solution  3 mL Nebulization Q4H PRN (RT) Mohan Verdin M.D.   3 mL at 11/26/18 0055   • ampicillin/sulbactam (UNASYN) 3 g in  mL IVPB  3 g Intravenous Q6HRS Graeme Hillman M.D. 200 mL/hr at 11/26/18 0523 3 g at 11/26/18 0523   • amiodarone (CORDARONE) tablet 400 mg  400 mg Oral TWICE DAILY Graciela Street, A.P.N.   400 mg at 11/26/18 0525   • furosemide (LASIX) injection 40 mg  40 mg Intravenous BID DIURETIC Graciela Street, A.P.N.   40 mg at 11/26/18 0526   • potassium chloride SA (Kdur) tablet 20 mEq  20 mEq Oral BID Graciela Street, A.P.N.   20 mEq at 11/26/18 0525   • diphenhydrAMINE (BENADRYL) tablet/capsule 25 mg  25 mg Oral Q6HRS PRN Graciela Street, A.P.N.   25 mg at 11/24/18 0036   • HYDROcodone-acetaminophen (NORCO) 5-325 MG per tablet 1-2 Tab  1-2 Tab Oral Q4HRS PRN Mohan Verdin M.D.   2 Tab at 11/26/18 0537   • hydrocortisone 1 % cream   Topical PRN Mohan Verdin M.D.       • tramadol (ULTRAM) 50 MG tablet 50 mg  50 mg Oral Q6HRS PRN Graciela L. Jad, A.P.N.   50 mg at 11/25/18 0914   • gabapentin (NEURONTIN) capsule 100 mg  100 mg Oral BID Graciela Street, A.P.N.   100 mg at 11/26/18 0525   • Respiratory Care per Protocol   Nebulization Continuous RT Graciela Street,  A.P.N.       • NS infusion   Intravenous Continuous Graciela Street, A.P.N. 30 mL/hr at 11/22/18 0123     • Pharmacy Consult Request ...Pain Management Review 1 Each  1 Each Other PRN Graciela Street, A.P.N.       • senna-docusate (PERICOLACE or SENOKOT S) 8.6-50 MG per tablet 2 Tab  2 Tab Oral BID Graciela Street, A.P.N.   2 Tab at 11/26/18 0524    And   • polyethylene glycol/lytes (MIRALAX) PACKET 1 Packet  1 Packet Oral QDAY PRN Graciela Street, A.P.N.   1 Packet at 11/23/18 1217    And   • magnesium hydroxide (MILK OF MAGNESIA) suspension 30 mL  30 mL Oral QDAY PRN Graciela Street, A.P.N.        And   • bisacodyl (DULCOLAX) suppository 10 mg  10 mg Rectal QDAY PRN Graciela Street, A.P.N.       • electrolyte-A (PLASMALYTE-A) infusion   Intravenous PRN Graciela Street, A.P.N.   1,000 mL at 11/21/18 1938   • enoxaparin (LOVENOX) inj 40 mg  40 mg Subcutaneous QDAY Graciela Street, A.P.N.   40 mg at 11/25/18 1720   • metoprolol (LOPRESSOR) tablet 25 mg  25 mg Oral BID Graciela Street, A.P.N.   25 mg at 11/26/18 0600   • clopidogrel (PLAVIX) tablet 75 mg  75 mg Oral DAILY Graciela Street, A.P.N.   75 mg at 11/26/18 0525   • oxyCODONE immediate-release (ROXICODONE) tablet 5 mg  5 mg Oral Q3HRS PRN Graciela Street, A.P.N.   5 mg at 11/21/18 1839   • oxyCODONE immediate release (ROXICODONE) tablet 10 mg  10 mg Oral Q3HRS PRN Graciela Street, A.P.N.   10 mg at 11/25/18 1605   • ondansetron (ZOFRAN) syringe/vial injection 4 mg  4 mg Intravenous Q6HRS PRN Graciela Street, A.P.N.   4 mg at 11/21/18 1829    Or   • prochlorperazine (COMPAZINE) injection 10 mg  10 mg Intravenous Q6HRS PRN Graciela Street, A.P.N.        Or   • promethazine (PHENERGAN) suppository 25 mg  25 mg Rectal Q6HRS PRN Graciela Street, A.P.N.       • acetaminophen (TYLENOL) tablet 650 mg  650 mg Oral Q4HRS PRN Graciela Street, A.P.N.   650 mg at 11/22/18 2134    Or   • acetaminophen (TYLENOL) suppository 650 mg  650 mg Rectal Q4HRS PRN  Graciela Street, A.P.N.       • mag hydrox-al hydrox-simeth (MAALOX PLUS ES or MYLANTA DS) suspension 30 mL  30 mL Oral Q4HRS PRN Graciela Street, A.P.N.       • fentaNYL (SUBLIMAZE) injection 50 mcg  50 mcg Intravenous Q3HRS PRN Graciela Street A.P.N.   50 mcg at 11/22/18 0926   • aspirin EC (ECOTRIN) tablet 81 mg  81 mg Oral DAILY Sd Ty M.D.   81 mg at 11/26/18 0600   • rosuvastatin (CRESTOR) tablet 20 mg  20 mg Oral Q EVENING Sd Ty M.D.   20 mg at 11/25/18 1717   • ALPRAZolam (XANAX) tablet 0.25 mg  0.25 mg Oral Q6HRS PRN Alexia Holland A.P.NVy   0.25 mg at 11/25/18 0023       Fluids    Intake/Output Summary (Last 24 hours) at 11/26/18 0615  Last data filed at 11/26/18 0400   Gross per 24 hour   Intake          1906.67 ml   Output             2750 ml   Net          -843.33 ml       Laboratory          Recent Labs      11/24/18 0623  11/25/18   0515   SODIUM  131*  134*   POTASSIUM  3.7  4.0   CHLORIDE  97  98   CO2  24  27   BUN  24*  26*   CREATININE  1.16  1.15   CALCIUM  8.9  8.8     Recent Labs      11/24/18   0623  11/25/18   0515   GLUCOSE  127*  120*     Recent Labs      11/24/18   0623  11/25/18   0515  11/26/18   0530   WBC  15.3*  14.4*  13.6*     Recent Labs      11/24/18   0623  11/25/18   0515  11/26/18   0530   RBC  3.81*  3.63*  3.78*   HEMOGLOBIN  12.6*  12.5*  12.4*   HEMATOCRIT  38.2*  35.8*  37.6*   PLATELETCT  266  295  363       Imaging  X-Ray:  My impression is: low lung volumes bilaterally    Assessment/Plan  * NSTEMI (non-ST elevated myocardial infarction) ([facility])   Assessment & Plan    Now revascularized with CABG     Acute respiratory failure following trauma and surgery ([facility])   Assessment & Plan    Pt's high oxygen requirement largely driven by poor inspiratory effort with related poor lung volumes  But now appears to have pneumonia LLL  - doing better with increased ambulation  - weaning oxygen but still needs 4 liters NC    With increased mobility much  better lung expansion both clinically and radiographically. Now down to 4 liters. Still opacity/ atalectasis at left base but improving  Continue increasing activity     Essential hypertension- (present on admission)   Assessment & Plan    Blood pressure OK for now on BB     Pneumonia due to infectious organism   Assessment & Plan    On antibx  Sputum shows multiple organisms, but not MRSA or pneudomonas, so on unasyn  Follow CXR  Start following CRP/PCT for next few days  Should do well     Class 1 obesity due to excess calories without serious comorbidity with body mass index (BMI) of 30.0 to 30.9 in adult   Assessment & Plan    Chronic challenge for pt. Hi obesity also compromises his respiratory status          VTE:  Heparin  Ulcer: Not Indicated  Lines: Central Line  Ongoing indication addressed    I have performed a physical exam and reviewed and updated ROS and Plan today (11/26/2018). In review of yesterday's note (11/25/2018), there are no changes except as documented above.     Discussed patient condition and risk of morbidity and/or mortality with Hospitalist, RN, RT, Therapies and Pharmacy  The patient remains ill but overall improving;  Level 3

## 2018-11-26 NOTE — PROGRESS NOTES
Received bedside report from Kristin COLES. Patient complaining of nausea and pain. Will medicate per orders and MAR. Patient's wife at bedside. Both patient and family updated on plan of care.

## 2018-11-26 NOTE — PROGRESS NOTES
Monitor Summary    Sinus Rhythm with rare ectopy 70-80s  .20/.12/.36    12 Hour Chart Check    2 RN Skin Assessment Complete

## 2018-11-27 ENCOUNTER — APPOINTMENT (OUTPATIENT)
Dept: RADIOLOGY | Facility: MEDICAL CENTER | Age: 68
DRG: 233 | End: 2018-11-27
Attending: INTERNAL MEDICINE
Payer: COMMERCIAL

## 2018-11-27 PROBLEM — H57.89 RED EYE: Status: ACTIVE | Noted: 2018-11-27

## 2018-11-27 LAB
ALBUMIN SERPL BCP-MCNC: 3.5 G/DL (ref 3.2–4.9)
ALP SERPL-CCNC: 164 U/L (ref 30–99)
ALT SERPL-CCNC: 32 U/L (ref 2–50)
ANION GAP SERPL CALC-SCNC: 12 MMOL/L (ref 0–11.9)
AST SERPL-CCNC: 31 U/L (ref 12–45)
BACTERIA SPEC RESP CULT: NORMAL
BILIRUB CONJ SERPL-MCNC: 0.2 MG/DL (ref 0.1–0.5)
BILIRUB INDIRECT SERPL-MCNC: 0.6 MG/DL (ref 0–1)
BILIRUB SERPL-MCNC: 0.8 MG/DL (ref 0.1–1.5)
BUN SERPL-MCNC: 31 MG/DL (ref 8–22)
CALCIUM SERPL-MCNC: 8.9 MG/DL (ref 8.5–10.5)
CHLORIDE SERPL-SCNC: 96 MMOL/L (ref 96–112)
CO2 SERPL-SCNC: 27 MMOL/L (ref 20–33)
CREAT SERPL-MCNC: 1.37 MG/DL (ref 0.5–1.4)
CRP SERPL HS-MCNC: 15.65 MG/DL (ref 0–0.75)
ERYTHROCYTE [DISTWIDTH] IN BLOOD BY AUTOMATED COUNT: 54.4 FL (ref 35.9–50)
GLUCOSE SERPL-MCNC: 117 MG/DL (ref 65–99)
GRAM STN SPEC: NORMAL
HCT VFR BLD AUTO: 36.6 % (ref 42–52)
HGB BLD-MCNC: 12.4 G/DL (ref 14–18)
MAGNESIUM SERPL-MCNC: 2.1 MG/DL (ref 1.5–2.5)
MCH RBC QN AUTO: 34 PG (ref 27–33)
MCHC RBC AUTO-ENTMCNC: 33.9 G/DL (ref 33.7–35.3)
MCV RBC AUTO: 100.3 FL (ref 81.4–97.8)
PHOSPHATE SERPL-MCNC: 4.8 MG/DL (ref 2.5–4.5)
PLATELET # BLD AUTO: 398 K/UL (ref 164–446)
PMV BLD AUTO: 9.2 FL (ref 9–12.9)
POTASSIUM SERPL-SCNC: 3.9 MMOL/L (ref 3.6–5.5)
PROCALCITONIN SERPL-MCNC: 0.14 NG/ML
PROT SERPL-MCNC: 6.5 G/DL (ref 6–8.2)
RBC # BLD AUTO: 3.65 M/UL (ref 4.7–6.1)
SIGNIFICANT IND 70042: NORMAL
SITE SITE: NORMAL
SODIUM SERPL-SCNC: 135 MMOL/L (ref 135–145)
SOURCE SOURCE: NORMAL
WBC # BLD AUTO: 12.7 K/UL (ref 4.8–10.8)

## 2018-11-27 PROCEDURE — 770020 HCHG ROOM/CARE - TELE (206)

## 2018-11-27 PROCEDURE — 80076 HEPATIC FUNCTION PANEL: CPT

## 2018-11-27 PROCEDURE — 700102 HCHG RX REV CODE 250 W/ 637 OVERRIDE(OP): Performed by: INTERNAL MEDICINE

## 2018-11-27 PROCEDURE — 71045 X-RAY EXAM CHEST 1 VIEW: CPT

## 2018-11-27 PROCEDURE — 99232 SBSQ HOSP IP/OBS MODERATE 35: CPT | Performed by: INTERNAL MEDICINE

## 2018-11-27 PROCEDURE — 700105 HCHG RX REV CODE 258: Performed by: INTERNAL MEDICINE

## 2018-11-27 PROCEDURE — A9270 NON-COVERED ITEM OR SERVICE: HCPCS | Performed by: INTERNAL MEDICINE

## 2018-11-27 PROCEDURE — A9270 NON-COVERED ITEM OR SERVICE: HCPCS | Performed by: NURSE PRACTITIONER

## 2018-11-27 PROCEDURE — 83735 ASSAY OF MAGNESIUM: CPT

## 2018-11-27 PROCEDURE — 700111 HCHG RX REV CODE 636 W/ 250 OVERRIDE (IP): Performed by: INTERNAL MEDICINE

## 2018-11-27 PROCEDURE — 85027 COMPLETE CBC AUTOMATED: CPT

## 2018-11-27 PROCEDURE — 700102 HCHG RX REV CODE 250 W/ 637 OVERRIDE(OP): Performed by: NURSE PRACTITIONER

## 2018-11-27 PROCEDURE — 84145 PROCALCITONIN (PCT): CPT

## 2018-11-27 PROCEDURE — 700111 HCHG RX REV CODE 636 W/ 250 OVERRIDE (IP): Performed by: NURSE PRACTITIONER

## 2018-11-27 PROCEDURE — 80048 BASIC METABOLIC PNL TOTAL CA: CPT

## 2018-11-27 PROCEDURE — 86140 C-REACTIVE PROTEIN: CPT

## 2018-11-27 PROCEDURE — A9270 NON-COVERED ITEM OR SERVICE: HCPCS | Performed by: THORACIC SURGERY (CARDIOTHORACIC VASCULAR SURGERY)

## 2018-11-27 PROCEDURE — 84100 ASSAY OF PHOSPHORUS: CPT

## 2018-11-27 PROCEDURE — 700102 HCHG RX REV CODE 250 W/ 637 OVERRIDE(OP): Performed by: THORACIC SURGERY (CARDIOTHORACIC VASCULAR SURGERY)

## 2018-11-27 RX ORDER — AMOXICILLIN AND CLAVULANATE POTASSIUM 875; 125 MG/1; MG/1
1 TABLET, FILM COATED ORAL EVERY 12 HOURS
Status: DISCONTINUED | OUTPATIENT
Start: 2018-11-27 | End: 2018-11-29 | Stop reason: HOSPADM

## 2018-11-27 RX ADMIN — FUROSEMIDE 40 MG: 10 INJECTION, SOLUTION INTRAMUSCULAR; INTRAVENOUS at 05:52

## 2018-11-27 RX ADMIN — POTASSIUM CHLORIDE 20 MEQ: 1500 TABLET, EXTENDED RELEASE ORAL at 16:59

## 2018-11-27 RX ADMIN — OXYCODONE HYDROCHLORIDE 10 MG: 10 TABLET ORAL at 14:57

## 2018-11-27 RX ADMIN — STANDARDIZED SENNA CONCENTRATE AND DOCUSATE SODIUM 2 TABLET: 8.6; 5 TABLET, FILM COATED ORAL at 16:58

## 2018-11-27 RX ADMIN — AMIODARONE HYDROCHLORIDE 400 MG: 200 TABLET ORAL at 16:57

## 2018-11-27 RX ADMIN — OXYCODONE HYDROCHLORIDE 10 MG: 10 TABLET ORAL at 04:05

## 2018-11-27 RX ADMIN — POTASSIUM CHLORIDE 20 MEQ: 1500 TABLET, EXTENDED RELEASE ORAL at 05:53

## 2018-11-27 RX ADMIN — STANDARDIZED SENNA CONCENTRATE AND DOCUSATE SODIUM 2 TABLET: 8.6; 5 TABLET, FILM COATED ORAL at 05:53

## 2018-11-27 RX ADMIN — HYDROCODONE BITARTRATE AND ACETAMINOPHEN 2 TABLET: 5; 325 TABLET ORAL at 16:58

## 2018-11-27 RX ADMIN — ROSUVASTATIN CALCIUM 20 MG: 10 TABLET, FILM COATED ORAL at 17:00

## 2018-11-27 RX ADMIN — TRAMADOL HYDROCHLORIDE 50 MG: 50 TABLET, FILM COATED ORAL at 11:17

## 2018-11-27 RX ADMIN — OXYCODONE HYDROCHLORIDE 10 MG: 10 TABLET ORAL at 23:01

## 2018-11-27 RX ADMIN — FUROSEMIDE 40 MG: 10 INJECTION, SOLUTION INTRAMUSCULAR; INTRAVENOUS at 14:56

## 2018-11-27 RX ADMIN — METOPROLOL TARTRATE 25 MG: 25 TABLET, FILM COATED ORAL at 17:00

## 2018-11-27 RX ADMIN — POLYVINYL ALCOHOL 1 DROP: 14 SOLUTION/ DROPS OPHTHALMIC at 10:05

## 2018-11-27 RX ADMIN — GABAPENTIN 100 MG: 100 CAPSULE ORAL at 05:53

## 2018-11-27 RX ADMIN — HYDROCODONE BITARTRATE AND ACETAMINOPHEN 2 TABLET: 5; 325 TABLET ORAL at 05:52

## 2018-11-27 RX ADMIN — AMPICILLIN SODIUM AND SULBACTAM SODIUM 3 G: 2; 1 INJECTION, POWDER, FOR SOLUTION INTRAMUSCULAR; INTRAVENOUS at 05:52

## 2018-11-27 RX ADMIN — AMIODARONE HYDROCHLORIDE 400 MG: 200 TABLET ORAL at 05:53

## 2018-11-27 RX ADMIN — OXYCODONE HYDROCHLORIDE 10 MG: 10 TABLET ORAL at 19:53

## 2018-11-27 RX ADMIN — AMOXICILLIN AND CLAVULANATE POTASSIUM 1 TABLET: 875; 125 TABLET, FILM COATED ORAL at 19:53

## 2018-11-27 RX ADMIN — AMOXICILLIN AND CLAVULANATE POTASSIUM 1 TABLET: 875; 125 TABLET, FILM COATED ORAL at 11:20

## 2018-11-27 RX ADMIN — METOPROLOL TARTRATE 25 MG: 25 TABLET, FILM COATED ORAL at 05:54

## 2018-11-27 RX ADMIN — ASPIRIN 81 MG: 81 TABLET, COATED ORAL at 05:53

## 2018-11-27 RX ADMIN — GABAPENTIN 100 MG: 100 CAPSULE ORAL at 16:59

## 2018-11-27 RX ADMIN — CLOPIDOGREL 75 MG: 75 TABLET, FILM COATED ORAL at 05:53

## 2018-11-27 RX ADMIN — ENOXAPARIN SODIUM 40 MG: 100 INJECTION SUBCUTANEOUS at 16:57

## 2018-11-27 ASSESSMENT — PAIN SCALES - GENERAL
PAINLEVEL_OUTOF10: 9
PAINLEVEL_OUTOF10: 10
PAINLEVEL_OUTOF10: 6
PAINLEVEL_OUTOF10: 6
PAINLEVEL_OUTOF10: 10
PAINLEVEL_OUTOF10: 3
PAINLEVEL_OUTOF10: 6
PAINLEVEL_OUTOF10: 10
PAINLEVEL_OUTOF10: 6
PAINLEVEL_OUTOF10: 10
PAINLEVEL_OUTOF10: 6
PAINLEVEL_OUTOF10: 10
PAINLEVEL_OUTOF10: 7
PAINLEVEL_OUTOF10: 7
PAINLEVEL_OUTOF10: 5
PAINLEVEL_OUTOF10: 6

## 2018-11-27 ASSESSMENT — ENCOUNTER SYMPTOMS
MYALGIAS: 0
BACK PAIN: 0
CARDIOVASCULAR NEGATIVE: 1
NEUROLOGICAL NEGATIVE: 1
EYES NEGATIVE: 1
CONSTITUTIONAL NEGATIVE: 1
INSOMNIA: 1
PSYCHIATRIC NEGATIVE: 1
GASTROINTESTINAL NEGATIVE: 1

## 2018-11-27 NOTE — DISCHARGE INSTRUCTIONS
Discharge Instructions    Discharged to home by car with relative. Discharged via wheelchair, hospital escort: Yes.  Special equipment needed: Oxygen    Be sure to schedule a follow-up appointment with your primary care doctor or any specialists as instructed.     Discharge Plan:   Pneumococcal Vaccine Administered/Refused: Not given - Patient refused pneumococcal vaccine (to be addressed at follow up)  Influenza Vaccine Indication: Patient Refuses (to be addressed at follow up)    I understand that a diet low in cholesterol, fat, and sodium is recommended for good health. Unless I have been given specific instructions below for another diet, I accept this instruction as my diet prescription.   Other diet: Cardiac    Special Instructions:     [facility] Discharge Instructions    Activity:  1. NO driving for 4 weeks after surgery. You may ride as a passenger.  2. NO lifting more than 10 pounds for 6 weeks.  3. For the next 6 weeks, keep your elbows close to your body and move within a pain-free motion when lifting, pushing or pulling.  Do not stretch both arms backwards at the same time.    4. Walk at least 4 times per day, there is no maximum.  5. Other physical activities (sex, housework, gardening, etc.) are okay after 4 weeks.  6. Continue using incentive spirometer for 2 weeks.  If you are going home on oxygen and you were not on oxygen prior to surgery, keep using until you are oxygen free.  7. Weigh yourself daily.  Call your Cardiologist for a weight gain of 2 or more pounds within 48 hours.    Incision Care:  1. SHOWER EVERYDAY-no baths. Make sure to clean your incision(s) twice daily with plain, perfume and dye-free soap.  Then pat incision(s) dry with clean towel. No creams or lotions on your incision(s).    2. If you are discharging with a Prevena bandage on your chest, you or your home health nurse may remove the bandage 7 days after surgery, or sooner if the battery runs out or the device  alarms. When the battery runs out, the bandage will lose suction and it will puff up.  To remove, slowly roll down the edges of the bandage. Throw away the entire bandage and the battery pack.  Keep the incision open to air and clean twice daily with soap and water.  3. If there is any increased redness or swelling, separation of the incision line, or thick drainage from any of your incisions, call [facility].    4. Continue to wear your CARLEY Stockings for 4 weeks. You may take them off when you are in bed or when your legs are elevated.    General Instructions:  1. You have been referred to Cardiac Rehab.  You can start Cardiac Rehab 30 days after surgery.  If you do not have an appointment at the time of discharge call [phone] to schedule an appointment.  2. Your Primary Care Doctor typically handles home oxygen. Oxygen may be stopped when your oxygen level is consistently greater than 90.  Check with your Primary Care Doctor if you are unsure.  3. Take all of your medications (including pain medications) as prescribed.  Taking medications other than prescribed can result in serious injury.    For Patients Discharged with Narcotic Pain Medication:   1. If a refill is needed, understand that only 1 refill will be provided and you must come to the [facility]’ office for an appointment (72 hours’ notice is required to schedule and there are no weekend appointments).  2. If the pain medications you are discharged on are not working, you will need to bring your remaining prescription into the office in order to receive a new prescription.  3. If you were taking narcotics prior to your heart surgery, [facility] will provide you with one prescription and additional medications will need to be provided by your pain management doctor.  4. Do not drink alcohol while taking narcotics.  5. Lost or stolen medications will not be refilled.  If medications are stolen, report to law  enforcement.    Contact [facility] at [phone] if you have any questions.    Diagnosis:  Acute Coronary Syndrome (ACS) is a diagnosis that encompasses cardiac-related chest pain and heart attack. ACS occurs when the blood flow to the heart muscle is severely reduced or cut off completely due to a slow process called atherosclerosis.  Atherosclerosis is a disease in which the coronary arteries become narrow from a buildup of fat, cholesterol, and other substances that combine to form plaque. If the plaque breaks, a blood clot will form and block the blood flow to the heart muscle. This lack of blood flow can cause damage or death to the heart muscle which is called a heart attack or Myocardial Infarction (MI). There are two different types of MIs:  ST Elevation Myocardial Infarction or STEMI (the most severe type of heart attack) and Non-ST Elevation Myocardial Infarction or NSTEMI.    Treatment Plan:  · Cardiac Diet  - Low fat, low salt, low cholesterol   · Cardiac Rehab  - Your doctor has ordered you a referral to [facility].  Call [phone] to schedule an appointment.  · Attend my follow-up appointment with my Cardiologist.  · Take my medications as prescribed by my doctor  · Exercise daily  · Lower my bad cholesterol and raise my good cholesterol, lower my blood pressure and Control my diabetes    Medications:  Certain medications are used to treat ACS.  Remember to always take medications as prescribed and never stop talking medications unless told by your doctor.    You have been prescribed the following medicatons:    Aspirin - Aspirin is used as a blood thinning medication and you will require this medication indefinitely.  Anti-platelet/blood thinner - Your Anti-platelet/Blood thinning medication is called clopidogrel, and is used in combination with aspirin to prevent clots from forming in your heart and/or around your stent.  Your doctor will determine how long you need to be on this  medicine.  Beta-Blocker - Beta-Blocker metoprolol is used to lower blood pressure and heart rate, and/or helps your heart heal after a heart attack.  Statin - Statin rosuvastatin is used to lower cholesterol.  Angiotensin Converting Enzyme (ACE) Inhibitor - Angiotensin Converting Enzyme Inhibitor lisinopril is used to lower blood pressure and treat heart failure.    · Is patient discharged on Warfarin / Coumadin?   No     Depression / Suicide Risk    As you are discharged from this [facility] facility, it is important to learn how to keep safe from harming yourself.    Recognize the warning signs:  · Abrupt changes in personality, positive or negative- including increase in energy   · Giving away possessions  · Change in eating patterns- significant weight changes-  positive or negative  · Change in sleeping patterns- unable to sleep or sleeping all the time   · Unwillingness or inability to communicate  · Depression  · Unusual sadness, discouragement and loneliness  · Talk of wanting to die  · Neglect of personal appearance   · Rebelliousness- reckless behavior  · Withdrawal from people/activities they love  · Confusion- inability to concentrate     If you or a loved one observes any of these behaviors or has concerns about self-harm, here's what you can do:  · Talk about it- your feelings and reasons for harming yourself  · Remove any means that you might use to hurt yourself (examples: pills, rope, extension cords, firearm)  · Get professional help from the community (Mental Health, Substance Abuse, psychological counseling)  · Do not be alone:Call your Safe Contact- someone whom you trust who will be there for you.  · Call your local CRISIS HOTLINE 990-5520 or 368-636-4082  · Call your local Children's Mobile Crisis Response Team [place] (221) 694-1746 or www.hint  · Call the toll free National Suicide Prevention Hotlines   · National Suicide Prevention Lifeline 854-613-DLFG  (7553)  · [facility] 800-SUICIDE (205-3540)

## 2018-11-27 NOTE — PROGRESS NOTES
Critical Care Progress Note    Date of admission  11/19/2018    Chief Complaint  68 y.o. male admitted 11/19/2018 with s/p CABG    Hospital Course   Extubated in ICU about 4 hours post-operatively  Appears to have developed a LLL pneumonia at this time  On unasyn; sputum with many WBC and many mixed organisms, but no MRSA or pseudomonas  Right eye very red and irritated: ophtho consult  Switch antibx to augmentin    Interval Problem Update  Overall doing well POD#6  Eye very irritating  2250 on IS  Still with central line   Wants night time xanax    Previous 24 hour events Interval update:  Reviewed last 24 hour events:  POD # 5 from CABG X 3  Fever ysesterday and productive cough  A&O X 4  Hemodynamically stable  IS of 1250   No syed; left SC central line  Right heal wound that wound care will see    Review of Systems  Review of Systems   Constitutional:        Did not sleep well last night; wants xanax tonight; says he takes it at home, although not on his records here   Respiratory:        Cough and SOB are improved   Cardiovascular:        Pleuritic chest pain is better   Psychiatric/Behavioral: The patient has insomnia.    All other systems reviewed and are negative.       Vital Signs for last 24 hours   Temp:  [36.8 °C (98.2 °F)-37.4 °C (99.3 °F)] 37.2 °C (99 °F)  Pulse:  [66-77] 72  Resp:  [16-26] 26  BP: (103)/(68) 103/68    Hemodynamic parameters for last 24 hours   hemodynamically stable    Respiratory   4 liters NC    Physical Exam   Physical Exam   Constitutional: He is oriented to person, place, and time. He appears well-developed and well-nourished.    obese   HENT:   Head: Normocephalic and atraumatic.   Eyes: Pupils are equal, round, and reactive to light. EOM are normal.   Right eye is very red  I don't see any foreign bodies   Neck: Normal range of motion. Neck supple.   Obese neck, can't see neck veins   Cardiovascular: Normal rate and regular rhythm.    Distant heart sounds, no M/G/R    Pulmonary/Chest: Effort normal and breath sounds normal. No respiratory distress. He has no wheezes. He has no rales.   Overall decreased BS   Abdominal: Soft. Bowel sounds are normal. He exhibits distension. There is no tenderness.   obese   Musculoskeletal: Normal range of motion. He exhibits edema.   Trace edema   Neurological: He is alert and oriented to person, place, and time. He has normal reflexes.   Skin: Skin is warm and dry.   Psychiatric: His behavior is normal. Judgment and thought content normal.   Seems a little depressed today       Medications  Current Facility-Administered Medications   Medication Dose Route Frequency Provider Last Rate Last Dose   • artificial tears 1.4 % ophthalmic solution 1 Drop  1 Drop Both Eyes Q2HRS PRN Julio Michaels M.D.       • ipratropium-albuterol (DUONEB) nebulizer solution  3 mL Nebulization Q4H PRN (RT) Mohan Verdin M.D.   3 mL at 11/26/18 2211   • ampicillin/sulbactam (UNASYN) 3 g in  mL IVPB  3 g Intravenous Q6HRS Graeme Hillman M.D.   Stopped at 11/27/18 0622   • amiodarone (CORDARONE) tablet 400 mg  400 mg Oral TWICE DAILY Graciela Street, A.P.N.   400 mg at 11/27/18 0553   • furosemide (LASIX) injection 40 mg  40 mg Intravenous BID DIURETIC Graciela Street, A.P.N.   40 mg at 11/27/18 0552   • potassium chloride SA (Kdur) tablet 20 mEq  20 mEq Oral BID Graciela Street, A.P.N.   20 mEq at 11/27/18 0553   • diphenhydrAMINE (BENADRYL) tablet/capsule 25 mg  25 mg Oral Q6HRS PRN Graciela Street, A.P.N.   25 mg at 11/26/18 2352   • HYDROcodone-acetaminophen (NORCO) 5-325 MG per tablet 1-2 Tab  1-2 Tab Oral Q4HRS PRN Mohan Verdin M.D.   2 Tab at 11/27/18 0552   • hydrocortisone 1 % cream   Topical PRN Mohan Verdin M.D.       • tramadol (ULTRAM) 50 MG tablet 50 mg  50 mg Oral Q6HRS PRN Graciela Duttont, A.P.N.   50 mg at 11/25/18 0914   • gabapentin (NEURONTIN) capsule 100 mg  100 mg Oral BID Graciela Duttont, A.P.N.   100 mg at 11/27/18  0553   • Respiratory Care per Protocol   Nebulization Continuous RT Graciela Street, A.P.N.       • NS infusion   Intravenous Continuous Graciela Street, A.P.N. 30 mL/hr at 11/22/18 0123     • Pharmacy Consult Request ...Pain Management Review 1 Each  1 Each Other PRN Graciela Street, A.P.N.       • senna-docusate (PERICOLACE or SENOKOT S) 8.6-50 MG per tablet 2 Tab  2 Tab Oral BID Graciela Street, A.P.N.   2 Tab at 11/27/18 0553    And   • polyethylene glycol/lytes (MIRALAX) PACKET 1 Packet  1 Packet Oral QDAY PRN Graciela Street, A.P.N.   1 Packet at 11/23/18 1217    And   • magnesium hydroxide (MILK OF MAGNESIA) suspension 30 mL  30 mL Oral QDAY PRN Graciela Street, A.P.N.        And   • bisacodyl (DULCOLAX) suppository 10 mg  10 mg Rectal QDAY PRN Graciela Duttont, A.P.N.       • enoxaparin (LOVENOX) inj 40 mg  40 mg Subcutaneous QDAY Graciela Street, A.P.N.   40 mg at 11/26/18 1714   • metoprolol (LOPRESSOR) tablet 25 mg  25 mg Oral BID Graciela Street, A.P.N.   25 mg at 11/27/18 0554   • clopidogrel (PLAVIX) tablet 75 mg  75 mg Oral DAILY Graciela Street, A.P.N.   75 mg at 11/27/18 0553   • oxyCODONE immediate-release (ROXICODONE) tablet 5 mg  5 mg Oral Q3HRS PRN Graciela Street, A.P.N.   5 mg at 11/26/18 1715   • oxyCODONE immediate release (ROXICODONE) tablet 10 mg  10 mg Oral Q3HRS PRN Graciela Street, A.P.N.   10 mg at 11/27/18 0405   • ondansetron (ZOFRAN) syringe/vial injection 4 mg  4 mg Intravenous Q6HRS PRN Graciela Street, A.P.N.   4 mg at 11/26/18 0723    Or   • prochlorperazine (COMPAZINE) injection 10 mg  10 mg Intravenous Q6HRS PRN Graciela Street, A.P.N.        Or   • promethazine (PHENERGAN) suppository 25 mg  25 mg Rectal Q6HRS PRN Graciela Street, A.P.N.       • acetaminophen (TYLENOL) tablet 650 mg  650 mg Oral Q4HRS PRN Graciela Street, A.P.N.   650 mg at 11/22/18 2134    Or   • acetaminophen (TYLENOL) suppository 650 mg  650 mg Rectal Q4HRS PRN Graciela Street, A.P.N.        • mag hydrox-al hydrox-simeth (MAALOX PLUS ES or MYLANTA DS) suspension 30 mL  30 mL Oral Q4HRS PRN Graciela Street, A.P.N.       • fentaNYL (SUBLIMAZE) injection 50 mcg  50 mcg Intravenous Q3HRS PRN Graciela Street, A.P.N.   50 mcg at 11/22/18 0926   • aspirin EC (ECOTRIN) tablet 81 mg  81 mg Oral DAILY Sd Ty M.D.   81 mg at 11/27/18 0553   • rosuvastatin (CRESTOR) tablet 20 mg  20 mg Oral Q EVENING Sd Ty M.D.   20 mg at 11/26/18 1846       Fluids    Intake/Output Summary (Last 24 hours) at 11/27/18 0606  Last data filed at 11/27/18 0300   Gross per 24 hour   Intake             2160 ml   Output             1900 ml   Net              260 ml       Laboratory          Recent Labs      11/24/18   0623  11/25/18   0515  11/26/18   0530   SODIUM  131*  134*  135   POTASSIUM  3.7  4.0  4.2   CHLORIDE  97  98  97   CO2  24  27  30   BUN  24*  26*  27*   CREATININE  1.16  1.15  1.04   CALCIUM  8.9  8.8  9.0     Recent Labs      11/24/18   0623  11/25/18   0515  11/26/18   0530   GLUCOSE  127*  120*  119*     Recent Labs      11/24/18   0623  11/25/18   0515  11/26/18   0530   WBC  15.3*  14.4*  13.6*     Recent Labs      11/24/18   0623  11/25/18   0515  11/26/18   0530   RBC  3.81*  3.63*  3.78*   HEMOGLOBIN  12.6*  12.5*  12.4*   HEMATOCRIT  38.2*  35.8*  37.6*   PLATELETCT  266  295  363       Imaging  X-Ray:  I have personally reviewed the images and compared with prior images. and My impression is: looks better on his LLL area    Assessment/Plan  * NSTEMI (non-ST elevated myocardial infarction) ([facility])   Assessment & Plan    Now revascularized with CABG     Acute respiratory failure following trauma and surgery ([facility])   Assessment & Plan    Pt's high oxygen requirement largely driven by poor inspiratory effort with related poor lung volumes  But now appears to have pneumonia LLL  - doing better with increased ambulation  - weaning oxygen but still needs 4 liters NC    With increased mobility much  better lung expansion both clinically and radiographically. Now down to 4 liters. Still opacity/ atalectasis at left base but improving  Continue increasing activity  On nasal canula doing fairly well     Essential hypertension- (present on admission)   Assessment & Plan    Blood pressure OK for now on BB     Red eye   Assessment & Plan    Eye drops for now  I do not see any foreign bodies  To be seen by optho- as outpt after discharge     Pneumonia due to infectious organism   Assessment & Plan    On antibx  Sputum shows multiple organisms, but not MRSA or pneudomonas, so on unasyn  Follow CXR  Start following CRP/PCT for next few days  Should do well  PCT 0.14, so switch to augementin to finish course     Class 1 obesity due to excess calories without serious comorbidity with body mass index (BMI) of 30.0 to 30.9 in adult   Assessment & Plan    Chronic challenge for pt. Hi obesity also compromises his respiratory status          VTE:  Lovenox  Ulcer: Not Indicated  Lines: Central Line  Ongoing indication addressed    I have performed a physical exam and reviewed and updated ROS and Plan today (11/27/2018). In review of yesterday's note (11/26/2018), there are no changes except as documented above.     Discussed patient condition and risk of morbidity and/or mortality with Hospitalist, RN, RT, Therapies and Pharmacy  The patient remains ill but overall improving;  Level 2

## 2018-11-27 NOTE — DISCHARGE PLANNING
Anticipated Discharge Disposition: pt to d/c home w/ o2 tomorrow.    Action: [facility] which has to be completed by Heart Surgeon. Left VM w/ Heart JAI Nakia. TANYA and Nakia will work tomorrow AM w/ Heart Surgery to complete [facility] referral so pt can d/c home to [place]. [facility] home O[facility] coordinator, Agustin indicates pt will most likely have to be admitted to program as a traveling pt as he is in [facility] system only.      Barriers to Discharge: O2, transport    Plan: f/u w/ medical team, pt

## 2018-11-27 NOTE — DISCHARGE PLANNING
Anticipated Discharge Disposition: pt to d/c home w/ O2 per APN w/ Heart Surgery    Action: [facility] lives in [place] and [facility]    LSw met w/ pt at bedside and he was only willing to discuss workmans comp claim and the contact information and numbers for this.    APN indicate pt is admitted for heart condition and this would not be covered by work injury or accident.    LSw called [facility] and they indicate pt has been accepted by [facility] as payor for this admission.     LSw spoke to [facility] company [employer]. They will accept orders from MD and can provide equipment to bedside.    LSW spoke to [facility] BELTRAN Pizarro. She indicates pt may have PCP in [place] given that he lives in [place].    LSw spoke to [facility] home o2 coordinator Agustin who indicates they can accept [facility] MD script/order, but pt will more than likely need to be added as a travel pt so he can get home and begin services there.    [facility] Home [facility] RT is Jannet Santo [phone] w/ fax number [phone]. Agustin will fax current referral form to this [facility].      Barriers to Discharge: [facility]2 from [facility]    Plan: pt to d/c home to [place] most likely tomorrow and will need [facility] from [facility]

## 2018-11-27 NOTE — PROGRESS NOTES
Cardiovascular Surgery Progress Note    Name: Romario Chaudhari  MRN: [id number]  : 1950  Admit Date: 2018  7:44 PM  Procedure:  Procedure(s) and Anesthesia Type:     * MULTIPLE CORONARY ARTERY BYPASS x3, RIGHT LEG ENDOSCOPIC VEIN HARVEST - General     * GOOD - General  6 Day Post-Op    Vitals:  Patient Vitals for the past 8 hrs:   Temp SpO2 O2 Delivery O2 (LPM) Pulse Heart Rate (Monitored) NIBP Weight   18 0800 36.1 °C (97 °F) 93 % Silicone Nasal Cannula 2 69 69 (!) 96/69 -   18 0600 - 93 % Silicone Nasal Cannula 2 69 - 119/77 104 kg (229 lb 4.5 oz)   18 0500 - 92 % - - 66 - - -   18 0400 37.1 °C (98.8 °F) 91 % Silicone Nasal Cannula 2 67 - 133/75 -   18 0300 - 92 % - - 61 - - -     Temp (24hrs), Av °C (98.6 °F), Min:36.1 °C (97 °F), Max:37.4 °C (99.3 °F)      Respiratory:    Respiration: (!) 26, Pulse Oximetry: 93 %, O2 Daily Delivery Respiratory : Silicone Nasal Cannula     Chest Tube Drains:          Fluids:    Intake/Output Summary (Last 24 hours) at 18 1036  Last data filed at 18 0300   Gross per 24 hour   Intake             1480 ml   Output             1700 ml   Net             -220 ml     Admit weight: Weight: 103.9 kg (229 lb 0.9 oz)  Current weight: Weight: 104 kg (229 lb 4.5 oz) (18 0600)    Labs:  Recent Labs      18   0515  18   0530  18   0604   WBC  14.4*  13.6*  12.7*   RBC  3.63*  3.78*  3.65*   HEMOGLOBIN  12.5*  12.4*  12.4*   HEMATOCRIT  35.8*  37.6*  36.6*   MCV  98.6*  99.5*  100.3*   MCH  34.4*  32.8  34.0*   MCHC  34.9  33.0*  33.9   RDW  53.8*  54.4*  54.4*   PLATELETCT  295  363  398   MPV  9.5  9.3  9.2         Recent Labs      18   0515  18   0530  18   0604   SODIUM  134*  135  135   POTASSIUM  4.0  4.2  3.9   CHLORIDE  98  97  96   CO2  27  30  27   GLUCOSE  120*  119*  117*   BUN  26*  27*  31*   CREATININE  1.15  1.04  1.37   CALCIUM  8.8  9.0  8.9           Medications:  •  ampicillin-sulbactam (UNASYN) IV  3 g Stopped (11/27/18 0622)   • amiodarone  400 mg     • furosemide  40 mg     • potassium chloride SA  20 mEq     • gabapentin  100 mg     • senna-docusate  2 Tab     • enoxaparin  40 mg     • metoprolol  25 mg     • clopidogrel  75 mg     • aspirin EC  81 mg     • rosuvastatin  20 mg          Ordered Medications:    ASA - Yes    Plavix - Yes    Post-operative Beta Blockers - Yes--    Ace Inhibitor - No; contraindicated because of Other normal ef    Statin - Yes          Central Line:  Type of line: TLC   Date of insertion: 11/21  Date of removal: see rn notes    Exam:   Review of Systems   Constitutional: Negative.    HENT: Negative.    Eyes: Negative.    Cardiovascular: Negative.    Gastrointestinal: Negative.    Genitourinary: Negative.    Musculoskeletal: Negative for back pain, joint pain and myalgias.   Skin: Negative.    Neurological: Negative.    Endo/Heme/Allergies: Negative.    Psychiatric/Behavioral: Negative.        Physical Exam   Constitutional: He is oriented to person, place, and time. He appears well-developed and well-nourished.   obese   HENT:   Head: Normocephalic.   Eyes: Pupils are equal, round, and reactive to light.   Neck: Normal range of motion. No JVD present.   Cardiovascular: Normal rate and regular rhythm.  Exam reveals no friction rub.    Pulmonary/Chest: He has decreased breath sounds in the right lower field and the left lower field.   Abdominal: Soft. Bowel sounds are normal. He exhibits no distension. There is no guarding.   Musculoskeletal: Normal range of motion.   Neurological: He is alert and oriented to person, place, and time.   Skin: Skin is warm and dry.   prevena to chest, [facility] site cdi   Psychiatric: He has a normal mood and affect.       Quality Measures:   Quality-Core Measures   Reviewed items::  EKG reviewed, Radiology images reviewed, Labs reviewed and Medications reviewed  Munoz catheter::  One or Two Days Post Surgery (Day of  Surgery being Day 0)  Central line in place:  Vasopressors  DVT prophylaxis pharmacological::  Enoxaparin (Lovenox)  DVT prophylaxis - mechanical:  SCDs  Ulcer Prophylaxis::  Yes  Assessed for rehabilitation services:  Patient was assess for and/or received rehabilitation services during this hospitalization      Assessment/Plan:  POD 1 Extubated, on HF NC.  CXR unchanged.  On Mod dose epi, CVP 3, +2.6L.  Episodes Hotn with coughing.  CT output min, no airleka.  PLAN:  Albumin bolus/caCl.  Add low dose bryan, wean down epi.  Discussed recent bronchitis/ABX with pulm--tx deferred to them.  Will start diuresis when BP allows. DC mediastinal CTs  POD 2 NSR, HDS off pressors, NC off high flow, feeling better today. +4L, edema.  Passing gas, no BM. PLAN:  DC wires/eran/foely.  Diurese.  AMB/IS  POD 3 HDS, afib last night, amio gtt and protocol started, now back in NSR. Diuresing well, lower O2 requirements, passing gas, no BM yet. PLAN: COntinue diuresis.  CHange to PO amio. 2 view CXR today.  Bowel protocol.  AMB/IS.  POD 4 HDS, NSR, 4 L NC.  Bad coughing last night, but better this AM.  Had BM.  Prior PT note has recommended rehab, patient uncertain.  PLAN:  Continue diuresis and RT treatments. Started on ABX by pulm. PT to see patient again for rehab vs home eval.  CPM.   POD 5  HDS, SR, neuro intact, wounds CDI, abdomin S/NT + BM, fluid balance euvolemic, wt stable.  Plan:  PT to evaluate for home vs rehab today. BB,  No ACE EF normal, Statin, IS/ambulate. CPM.  POD 6 HDS, NSR, down to 2 l nc, on unasyn LLL pnuemonia.  PT cleared for home by PT. Continued L eye pain, intensivists having opthamologist see today.  Continuing IV abx for now.  Will re-eval for DC home in the am. CPM.  Home O2 ordered.    Active Hospital Problems    Diagnosis   • Acute respiratory failure following trauma and surgery (HCC) [J95.821]     Priority: High     Pt on wean post-op CABG, Evaluated and re-evaluated me along with RT and nursing. He  meets weaning parameters, he is awake, alert, moves all 4 extremities, able to lift his head off the bed, minimal secretions, on minimal pressor, good cough and gag.     I will give patient a trial of extubation.      • Essential hypertension [I10]     Priority: High   • Pneumonia due to infectious organism [J18.9]   • Acute bronchitis [J20.9]     Acute bronchitis with possible LLL pneumonia although no fever and without antibiotics WBC is decreasing. Because of the copious secretions I have started empiric Unasyn to treat hospital acquired bronchitis (5 day course, can change to oral in 1-2 days pending clinical course)  - sputum sent for culture  - encourage mobility     • NSTEMI (non-ST elevated myocardial infarction) (HCC) [I21.4]   • Class 1 obesity due to excess calories without serious comorbidity with body mass index (BMI) of 30.0 to 30.9 in adult [E66.09, Z68.30]

## 2018-11-27 NOTE — CARE PLAN
Problem: Oxygenation/Respiratory Function  Goal: Patient will Achieve/Maintain Optimum Respiratory Rate/Effort    Intervention: Administer/Titrate Oxygen as Ordered   11/21/18 1458 11/23/18 0304 11/23/18 0400   Vitals   Pulse Oximetry --  --  --    OTHER   O2 (LPM) --  --  --    FiO2% --  --  70 %   O2 Delivery --  --  --    (ASV) % MIN  --  --    FiO2 (HHFNC) --  70 --    Incentive Spirometer Volume --  --  --     11/27/18 0000 11/27/18 0600   Vitals   Pulse Oximetry 91 % --    OTHER   O2 (LPM) --  2   FiO2% --  --    O2 Delivery --  Silicone Nasal Cannula   (ASV) % MIN VOL --  --    FiO2 (HHFNC) --  --    Incentive Spirometer Volume 2000 mL --          Problem: Pain  Goal: Alleviation of Pain or a reduction in pain to the patient's comfort goal    Intervention: Pain Management--Medications  Pt medicated per MAR

## 2018-11-27 NOTE — FACE TO FACE
Face to Face Note  -  Durable Medical Equipment    ALISTAIR Johnson - NPI: [id number]  I certify that this patient is under my care and that they had a durable medical equipment(DME)face to face encounter by myself that meets the physician DME face-to-face encounter requirements with this patient on:    Date of encounter:   Patient:                    MRN:                       YOB: 2018  Romario Chaudhari  [id number]  1950     The encounter with the patient was in whole, or in part, for the following medical condition, which is the primary reason for durable medical equipment:  Post-Op Surgery    I certify that, based on my findings, the following durable medical equipment is medically necessary:  Oxygen.    HOME O2 Saturation Measurements:(Values must be present for Home Oxygen orders)  Room air sat at rest: 88  Room air sat with amb: 84  With liters of O2: 2, O2 sat at rest with O2: 95  With Liters of O2: 4, O2 sat with amb with O2 : 90  Is the patient mobile?: Yes    My Clinical findings support the need for the above equipment due to:  Hypoxia    Supporting Symptoms: COPD, s/p cabg

## 2018-11-27 NOTE — CARE PLAN
Problem: Post Op Day 4 CABG/Heart Valve Replacement  Goal: Optimal care of the Post Op CABG/Heart Valve replacement Post Op Day 4  Outcome: PROGRESSING SLOWER THAN EXPECTED  Patient is POD #5  Intervention: Daily Weights  Complete  Intervention: Shower daily and clean incisions twice daily with soap and water  Patient refused shower.  Intervention: Up in chair for all meals  Complete  Intervention: Ambulate, increasing the distance each time x 3 and before bed  Complete  Intervention: IS q 1 hour while awake and record best IS volume  Complete, best IS 1250  Intervention: Consider removal of syed, chest tube and pacer wire if not already done  Complete

## 2018-11-27 NOTE — WOUND TEAM
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    WOUND TYPE, LOCATION, CHARACTERISTICS (Pressure Injuries: location, stage, POA or date identified)     Wound 11/19/18 Other (comment) Ankle;Heel old staph infection surgical site (Active)   Wound Image      Site Assessment Yellow    Jaye-wound Assessment Maceration    Margins Attached edges;Defined edges    Wound Length (cm) 1.9 cm    Wound Width (cm) 0.7 cm    Wound Surface Area (cm^2) 1.33 cm^2    Closure None    Drainage Amount Moderate    Drainage Description Yellow    Non-staged Wound Description Full thickness    Treatments Cleansed;Site care    Cleansing Normal Saline Irrigation    Periwound Protectant Skin Protectant wipes to Periwound    Dressing Options Hydrofiber Silver;Nonadhesive Foam;Hypafix Tape    Dressing Cleansing/Solutions Not Applicable    Dressing Changed New    Dressing Status Clean;Intact;Dry    Dressing Change Frequency Every 48 hrs    NEXT Dressing Change  11/29/18    NEXT Weekly Photo (Inpatient Only) 12/04/18    WOUND NURSE ONLY - Odor None    WOUND NURSE ONLY - Pulses Right;DP;2+;PT    WOUND NURSE ONLY - Exposed Structures Other (Comments)    WOUND NURSE ONLY - Tissue Type and Percentage 100% Yellow    WOUND NURSE ONLY - Time Spent with Patient (mins) 60          Location and type of wound: Right foot, 2nd digit        Periwound:        calloused      Drainage:        NONE      Tissue Type and %:       100% blue brown scab     Wound Edges:         attached  Odor:             "       NONE  Exposed structure(s):                 NONE  S&S of Infection:      NONE      Measurements:    (Obtained 11/27/2018)  Length:       0.3 cm  Width:        0.3 cm   Depth:       NONE  Tracts/Undermining:     NONE    Vascular:    Dorsal Pedal pulses:  Right Palpable   Posterior tib pulses:   Right Difficult to assess due to swelling    Lab Values:    WBC:       WBC   Date/Time Value Ref Range Status   11/27/2018 06:04 AM 12.7 (H) 4.8 - 10.8 K/uL Final     AIC:      Lab Results   Component Value Date/Time    HBA1C 6.5 (H) 11/19/2018 08:00 PM         Culture:   NA    INTERVENTIONS BY WOUND TEAM:  This RN in to see patient, Pt agreeable to assessment. Right sock removed. Pulses assessed. Toes assessed. Pt states he stubs them all the time, pt has peripheral neuropathy. The wound to the toes is small and appears to be healing. Pts right achilles tendon wound then assessed. Dressing removed. The dressing was severely saturated due to pt refusing it to be changed in 6 days. The leoncio-wound is macerated. This RN cleansed the wound with NS and gauze. Measurements and picture obtained of the wound as well as of the entire area including the macerated area. A piece of hydrofiber silver was cut to fit over the wound and was secured in place with a piece of nonadhesive foam and hypafix tape. Pt requested the number to the billing department as well as drops for his right eye which is red and hurts. This RN provided the number for billing as well as a pen and piece of paper. This RN then discussed with bedside RN who brought in artificial tears and explained to pt that she would be discussing his eye with the physician during rounds.     Dressing selection:  Right Achilles Area: Aquacel Ag, Non-Adhesive Foam, Hypafix tape         Interdisciplinary consultation: Patient, Bedside RN (Delilah), Pts Wife    EVALUATION: Pt is an older gentleman who had surgery on his right achilles tendon on 10/28/2018 in [place] and  subsequently had 3 I&D's to the area due to staph infection. Pt was admitted from [place] after having an MI. Pt had a dressing in place to the achilles tendon area for 6 days due to fear of getting another staph infection. This RN was able to convince pt to have dressing removed. Aquacel Ag Hydrofiber applied to manage bioburden, absorb exudate, and maintain a moist wound environment without laterally wicking exudate therefore reducing leoncio-wound maceration.    Factors affecting wound healing: Age, Peripheral Neuropathy, Diabetes, Obesity, HTN, Recent NSTEMI  Goals: Steady decrease in wound area and depth weekly.    NURSING PLAN OF CARE ORDERS (X):    Dressing changes: See Dressing Care orders: X  Skin care: See Skin Care orders:   Rectal tube care: See Rectal Tube Care orders:   Other orders:    RSKIN: CURRENT (X) ORDERED (O):   Q shift Barney:  X  Q shift pressure point assessments:  X  Pressure redistribution mattress X           MONIQUE          Bariatric MONIQUE         Bariatric foam           Heel float boots          Float Heels off Bed with Pillows X              Barrier wipes         Barrier Cream         Barrier paste          Sacral silicone dressing         Silicone O2 tubing  X       Anchorfast         Cannula fixation Device (Tender )          Gray Foam Ear protectors           Trach with Optifoam split foam                 Waffle cushion        Waffle Overlay         Rectal tube or BMS         Antifungal tx      Interdry          Turn q 2 hours        Up to chair  X      Ambulate  X    PT/OT        Dietician        Diabetes Education      PO X - Cardiac/Carb Consistent    TF     TPN     NPO   # days   Other        WOUND TEAM PLAN OF CARE (X):   NPWT change 3 x week:        Dressing changes by wound team:       Follow up as needed: X      Other (explain):     Anticipated discharge plans (X):   SNF:           Home Care:           Outpatient Wound Center:            Self Care: X, pt lives in [facility]            Other:

## 2018-11-28 ENCOUNTER — PATIENT OUTREACH (OUTPATIENT)
Dept: HEALTH INFORMATION MANAGEMENT | Facility: OTHER | Age: 68
End: 2018-11-28

## 2018-11-28 ENCOUNTER — APPOINTMENT (OUTPATIENT)
Dept: RADIOLOGY | Facility: MEDICAL CENTER | Age: 68
DRG: 233 | End: 2018-11-28
Attending: INTERNAL MEDICINE
Payer: COMMERCIAL

## 2018-11-28 LAB
ANION GAP SERPL CALC-SCNC: 10 MMOL/L (ref 0–11.9)
BUN SERPL-MCNC: 28 MG/DL (ref 8–22)
CALCIUM SERPL-MCNC: 9 MG/DL (ref 8.5–10.5)
CHLORIDE SERPL-SCNC: 94 MMOL/L (ref 96–112)
CO2 SERPL-SCNC: 30 MMOL/L (ref 20–33)
CREAT SERPL-MCNC: 1.27 MG/DL (ref 0.5–1.4)
ERYTHROCYTE [DISTWIDTH] IN BLOOD BY AUTOMATED COUNT: 54.5 FL (ref 35.9–50)
GLUCOSE SERPL-MCNC: 130 MG/DL (ref 65–99)
HCT VFR BLD AUTO: 39.6 % (ref 42–52)
HGB BLD-MCNC: 13 G/DL (ref 14–18)
MAGNESIUM SERPL-MCNC: 2.3 MG/DL (ref 1.5–2.5)
MCH RBC QN AUTO: 32.9 PG (ref 27–33)
MCHC RBC AUTO-ENTMCNC: 32.8 G/DL (ref 33.7–35.3)
MCV RBC AUTO: 100.3 FL (ref 81.4–97.8)
PHOSPHATE SERPL-MCNC: 4.4 MG/DL (ref 2.5–4.5)
PLATELET # BLD AUTO: 510 K/UL (ref 164–446)
PMV BLD AUTO: 8.8 FL (ref 9–12.9)
POTASSIUM SERPL-SCNC: 4 MMOL/L (ref 3.6–5.5)
RBC # BLD AUTO: 3.95 M/UL (ref 4.7–6.1)
SODIUM SERPL-SCNC: 134 MMOL/L (ref 135–145)
WBC # BLD AUTO: 13 K/UL (ref 4.8–10.8)

## 2018-11-28 PROCEDURE — 770020 HCHG ROOM/CARE - TELE (206)

## 2018-11-28 PROCEDURE — 80048 BASIC METABOLIC PNL TOTAL CA: CPT

## 2018-11-28 PROCEDURE — A9270 NON-COVERED ITEM OR SERVICE: HCPCS | Performed by: INTERNAL MEDICINE

## 2018-11-28 PROCEDURE — 85027 COMPLETE CBC AUTOMATED: CPT

## 2018-11-28 PROCEDURE — 83735 ASSAY OF MAGNESIUM: CPT

## 2018-11-28 PROCEDURE — 71045 X-RAY EXAM CHEST 1 VIEW: CPT

## 2018-11-28 PROCEDURE — 84100 ASSAY OF PHOSPHORUS: CPT

## 2018-11-28 PROCEDURE — 99232 SBSQ HOSP IP/OBS MODERATE 35: CPT | Performed by: INTERNAL MEDICINE

## 2018-11-28 PROCEDURE — 700102 HCHG RX REV CODE 250 W/ 637 OVERRIDE(OP): Performed by: NURSE PRACTITIONER

## 2018-11-28 PROCEDURE — A9270 NON-COVERED ITEM OR SERVICE: HCPCS | Performed by: NURSE PRACTITIONER

## 2018-11-28 PROCEDURE — 700111 HCHG RX REV CODE 636 W/ 250 OVERRIDE (IP): Performed by: NURSE PRACTITIONER

## 2018-11-28 PROCEDURE — 700102 HCHG RX REV CODE 250 W/ 637 OVERRIDE(OP): Performed by: INTERNAL MEDICINE

## 2018-11-28 RX ORDER — FUROSEMIDE 40 MG/1
40 TABLET ORAL DAILY
Qty: 30 TAB | Refills: 3 | Status: SHIPPED | OUTPATIENT
Start: 2018-11-28 | End: 2018-11-28

## 2018-11-28 RX ORDER — CLOPIDOGREL BISULFATE 75 MG/1
75 TABLET ORAL DAILY
Qty: 30 TAB | Refills: 3 | Status: SHIPPED | OUTPATIENT
Start: 2018-11-29 | End: 2018-12-13 | Stop reason: SDUPTHER

## 2018-11-28 RX ORDER — POTASSIUM CHLORIDE 750 MG/1
10 TABLET, EXTENDED RELEASE ORAL DAILY
Qty: 30 TAB | Refills: 3 | Status: SHIPPED | OUTPATIENT
Start: 2018-11-28 | End: 2018-12-13 | Stop reason: SDUPTHER

## 2018-11-28 RX ORDER — POLYVINYL ALCOHOL 14 MG/ML
1 SOLUTION/ DROPS OPHTHALMIC
Qty: 1 BOTTLE | Refills: 3 | Status: SHIPPED | OUTPATIENT
Start: 2018-11-28

## 2018-11-28 RX ORDER — FUROSEMIDE 40 MG/1
40 TABLET ORAL DAILY
Qty: 30 TAB | Refills: 3 | Status: SHIPPED | OUTPATIENT
Start: 2018-11-28 | End: 2018-12-04

## 2018-11-28 RX ORDER — AMIODARONE HYDROCHLORIDE 400 MG/1
400 TABLET ORAL DAILY
Qty: 30 TAB | Refills: 3 | Status: SHIPPED | OUTPATIENT
Start: 2018-11-28 | End: 2018-12-13 | Stop reason: SDUPTHER

## 2018-11-28 RX ORDER — AMOXICILLIN AND CLAVULANATE POTASSIUM 875; 125 MG/1; MG/1
1 TABLET, FILM COATED ORAL EVERY 12 HOURS
Qty: 20 TAB | Refills: 0 | Status: SHIPPED | OUTPATIENT
Start: 2018-11-28 | End: 2019-01-24

## 2018-11-28 RX ORDER — ROSUVASTATIN CALCIUM 20 MG/1
20 TABLET, COATED ORAL EVERY EVENING
Qty: 30 TAB | Refills: 3 | Status: SHIPPED | OUTPATIENT
Start: 2018-11-28 | End: 2018-12-13 | Stop reason: SDUPTHER

## 2018-11-28 RX ORDER — OXYCODONE HYDROCHLORIDE 10 MG/1
10 TABLET ORAL EVERY 4 HOURS PRN
Qty: 42 TAB | Refills: 0 | Status: SHIPPED | OUTPATIENT
Start: 2018-11-28 | End: 2018-12-05

## 2018-11-28 RX ADMIN — AMOXICILLIN AND CLAVULANATE POTASSIUM 1 TABLET: 875; 125 TABLET, FILM COATED ORAL at 05:42

## 2018-11-28 RX ADMIN — ENOXAPARIN SODIUM 40 MG: 100 INJECTION SUBCUTANEOUS at 18:40

## 2018-11-28 RX ADMIN — AMIODARONE HYDROCHLORIDE 400 MG: 200 TABLET ORAL at 18:36

## 2018-11-28 RX ADMIN — ROSUVASTATIN CALCIUM 20 MG: 10 TABLET, FILM COATED ORAL at 18:32

## 2018-11-28 RX ADMIN — METOPROLOL TARTRATE 25 MG: 25 TABLET, FILM COATED ORAL at 05:46

## 2018-11-28 RX ADMIN — AMOXICILLIN AND CLAVULANATE POTASSIUM 1 TABLET: 875; 125 TABLET, FILM COATED ORAL at 18:34

## 2018-11-28 RX ADMIN — FUROSEMIDE 40 MG: 10 INJECTION, SOLUTION INTRAMUSCULAR; INTRAVENOUS at 18:38

## 2018-11-28 RX ADMIN — ASPIRIN 81 MG: 81 TABLET, COATED ORAL at 05:45

## 2018-11-28 RX ADMIN — POTASSIUM CHLORIDE 20 MEQ: 1500 TABLET, EXTENDED RELEASE ORAL at 18:36

## 2018-11-28 RX ADMIN — POTASSIUM CHLORIDE 20 MEQ: 1500 TABLET, EXTENDED RELEASE ORAL at 05:44

## 2018-11-28 RX ADMIN — CLOPIDOGREL 75 MG: 75 TABLET, FILM COATED ORAL at 05:41

## 2018-11-28 RX ADMIN — AMIODARONE HYDROCHLORIDE 400 MG: 200 TABLET ORAL at 05:42

## 2018-11-28 RX ADMIN — DIPHENHYDRAMINE HCL 25 MG: 25 TABLET ORAL at 23:50

## 2018-11-28 RX ADMIN — OXYCODONE HYDROCHLORIDE 10 MG: 10 TABLET ORAL at 18:34

## 2018-11-28 RX ADMIN — GABAPENTIN 100 MG: 100 CAPSULE ORAL at 18:34

## 2018-11-28 RX ADMIN — OXYCODONE HYDROCHLORIDE 10 MG: 10 TABLET ORAL at 13:56

## 2018-11-28 RX ADMIN — OXYCODONE HYDROCHLORIDE 10 MG: 10 TABLET ORAL at 05:42

## 2018-11-28 RX ADMIN — OXYCODONE HYDROCHLORIDE 10 MG: 10 TABLET ORAL at 02:17

## 2018-11-28 RX ADMIN — GABAPENTIN 100 MG: 100 CAPSULE ORAL at 05:41

## 2018-11-28 RX ADMIN — STANDARDIZED SENNA CONCENTRATE AND DOCUSATE SODIUM 2 TABLET: 8.6; 5 TABLET, FILM COATED ORAL at 05:44

## 2018-11-28 RX ADMIN — STANDARDIZED SENNA CONCENTRATE AND DOCUSATE SODIUM 2 TABLET: 8.6; 5 TABLET, FILM COATED ORAL at 20:00

## 2018-11-28 RX ADMIN — METOPROLOL TARTRATE 25 MG: 25 TABLET, FILM COATED ORAL at 18:36

## 2018-11-28 RX ADMIN — FUROSEMIDE 40 MG: 10 INJECTION, SOLUTION INTRAMUSCULAR; INTRAVENOUS at 05:45

## 2018-11-28 RX ADMIN — ONDANSETRON HYDROCHLORIDE 4 MG: 2 INJECTION, SOLUTION INTRAMUSCULAR; INTRAVENOUS at 06:37

## 2018-11-28 ASSESSMENT — PAIN SCALES - GENERAL
PAINLEVEL_OUTOF10: 6
PAINLEVEL_OUTOF10: 5
PAINLEVEL_OUTOF10: 5
PAINLEVEL_OUTOF10: 7
PAINLEVEL_OUTOF10: 8
PAINLEVEL_OUTOF10: 7
PAINLEVEL_OUTOF10: 4
PAINLEVEL_OUTOF10: 5
PAINLEVEL_OUTOF10: 7

## 2018-11-28 ASSESSMENT — ENCOUNTER SYMPTOMS
NEUROLOGICAL NEGATIVE: 1
CARDIOVASCULAR NEGATIVE: 1
EYES NEGATIVE: 1
GASTROINTESTINAL NEGATIVE: 1
BACK PAIN: 0
CONSTITUTIONAL NEGATIVE: 1
MYALGIAS: 0
PSYCHIATRIC NEGATIVE: 1
INSOMNIA: 0

## 2018-11-28 NOTE — DISCHARGE PLANNING
Anticipated Discharge Disposition: d/c home w/ O2    Action: Spoke to hospital . She will contact [facility] and acquire a f/u appointment for pt then place information in AVS so pt can f/u out pt.     Spoke to SWS. Will have to continue to f/u w/ workman's comp INS to acquire completed O2 DME order.    Barriers to Discharge: referral and acceptance to O2 DME company    Plan: f/u w/ [facility], medical team

## 2018-11-28 NOTE — DISCHARGE PLANNING
Anticipated Discharge Disposition: d/c to friends home for two nights w/ DME O2 and walker     Action: Spoke to supervisor Graeme. He would like support w/ local DMe company suggestions for quicker turn around time.     LSw suggested [facility] for the walker. He indicates they are contracted w/ [facility].     LSw will collect information for DMe O2 company who either works in both [place] and [place] or has a sister company who works together between the two locations.      LSw spoke to [facility] and it appears [employer] will work both locations to delivery O2. They will next deliver to pt's home on 12/13. LSW requested the local provider deliver enough tanks for car transport around [place] and to [place], and concentrator. CCA received updated choice forms x2. CCa sending referral to José Miguel and Christ to call them for payment.      Bedside RN has entered [employer] order for pt to receive walker at bedside.     Lsw spoke to worker's comp supervisor Christ. He will receive the contact information for both [facility] and [facility] to set up payments.      Barriers to Discharge: acceptance, receipt of equipment     Plan: f/u w/ medical team, pt, worker's comp

## 2018-11-28 NOTE — DISCHARGE PLANNING
Agency/Facility Name: [employer]  Spoke To: Isela  Outcome: Referral faxed @ [phone] and received by [employer] at 2610, referral is currently under review.

## 2018-11-28 NOTE — PROGRESS NOTES
Cardiovascular Surgery Progress Note    Name: Romario Chaudhari  MRN: [id number]  : 1950  Admit Date: 2018  7:44 PM  Procedure:  Procedure(s) and Anesthesia Type:     * MULTIPLE CORONARY ARTERY BYPASS x3, RIGHT LEG ENDOSCOPIC VEIN HARVEST - General     * GOOD - General  7 Day Post-Op    Vitals:  Patient Vitals for the past 8 hrs:   Temp O2 Delivery O2 (LPM) Pulse Resp BP Weight   18 0600 - - - - - - 102.8 kg (226 lb 10.1 oz)   18 0535 36.7 °C (98.1 °F) Silicone Nasal Cannula 2 67 16 106/68 -     Temp (24hrs), Av.7 °C (98 °F), Min:36.2 °C (97.2 °F), Max:37.1 °C (98.7 °F)      Respiratory:    Respiration: 16, Pulse Oximetry: 93 %     Chest Tube Drains:          Fluids:    Intake/Output Summary (Last 24 hours) at 18 1144  Last data filed at 18 0640   Gross per 24 hour   Intake             1310 ml   Output             1700 ml   Net             -390 ml     Admit weight: Weight: 103.9 kg (229 lb 0.9 oz)  Current weight: Weight: 102.8 kg (226 lb 10.1 oz) (18 0600)    Labs:  Recent Labs      18   0530  18   0604  18   0540   WBC  13.6*  12.7*  13.0*   RBC  3.78*  3.65*  3.95*   HEMOGLOBIN  12.4*  12.4*  13.0*   HEMATOCRIT  37.6*  36.6*  39.6*   MCV  99.5*  100.3*  100.3*   MCH  32.8  34.0*  32.9   MCHC  33.0*  33.9  32.8*   RDW  54.4*  54.4*  54.5*   PLATELETCT  363  398  510*   MPV  9.3  9.2  8.8*         Recent Labs      18   0530  18   0604  18   0540   SODIUM  135  135  134*   POTASSIUM  4.2  3.9  4.0   CHLORIDE  97  96  94*   CO2  30  27  30   GLUCOSE  119*  117*  130*   BUN  27*  31*  28*   CREATININE  1.04  1.37  1.27   CALCIUM  9.0  8.9  9.0           Medications:  • amoxicillin-clavulanate  1 Tab     • amiodarone  400 mg     • furosemide  40 mg     • potassium chloride SA  20 mEq     • gabapentin  100 mg     • senna-docusate  2 Tab     • enoxaparin  40 mg     • metoprolol  25 mg     • clopidogrel  75 mg     • aspirin EC  81 mg     •  rosuvastatin  20 mg          Ordered Medications:    ASA - Yes    Plavix - Yes    Post-operative Beta Blockers - Yes--    Ace Inhibitor - No; contraindicated because of Other normal ef    Statin - Yes          Central Line:  Type of line: TLC   Date of insertion: 11/21  Date of removal: see rn notes    Exam:   Review of Systems   Constitutional: Negative.    HENT: Negative.    Eyes: Negative.    Cardiovascular: Negative.    Gastrointestinal: Negative.    Genitourinary: Negative.    Musculoskeletal: Negative for back pain, joint pain and myalgias.   Skin: Negative.    Neurological: Negative.    Endo/Heme/Allergies: Negative.    Psychiatric/Behavioral: Negative.        Physical Exam   Constitutional: He is oriented to person, place, and time. He appears well-developed and well-nourished.   obese   HENT:   Head: Normocephalic.   Eyes: Pupils are equal, round, and reactive to light.   Neck: Normal range of motion. No JVD present.   Cardiovascular: Normal rate and regular rhythm.  Exam reveals no friction rub.    Pulmonary/Chest: He has decreased breath sounds in the right lower field and the left lower field.   Abdominal: Soft. Bowel sounds are normal. He exhibits no distension. There is no guarding.   Musculoskeletal: Normal range of motion.   Neurological: He is alert and oriented to person, place, and time.   Skin: Skin is warm and dry.   prevena to chest, evh site cdi   Psychiatric: He has a normal mood and affect.       Quality Measures:   Quality-Core Measures   Reviewed items::  EKG reviewed, Radiology images reviewed, Labs reviewed and Medications reviewed  Munoz catheter::  One or Two Days Post Surgery (Day of Surgery being Day 0)  Central line in place:  Vasopressors  DVT prophylaxis pharmacological::  Enoxaparin (Lovenox)  DVT prophylaxis - mechanical:  SCDs  Ulcer Prophylaxis::  Yes  Assessed for rehabilitation services:  Patient was assess for and/or received rehabilitation services during this  hospitalization      Assessment/Plan:  POD 1 Extubated, on HF NC.  CXR unchanged.  On Mod dose epi, CVP 3, +2.6L.  Episodes Hotn with coughing.  CT output min, no airleka.  PLAN:  Albumin bolus/caCl.  Add low dose bryan, wean down epi.  Discussed recent bronchitis/ABX with pulm--tx deferred to them.  Will start diuresis when BP allows. DC mediastinal CTs  POD 2 NSR, HDS off pressors, NC off high flow, feeling better today. +4L, edema.  Passing gas, no BM. PLAN:  DC wires/eran/foely.  Diurese.  AMB/IS  POD 3 HDS, afib last night, amio gtt and protocol started, now back in NSR. Diuresing well, lower O2 requirements, passing gas, no BM yet. PLAN: COntinue diuresis.  CHange to PO amio. 2 view CXR today.  Bowel protocol.  AMB/IS.  POD 4 HDS, NSR, 4 L NC.  Bad coughing last night, but better this AM.  Had BM.  Prior PT note has recommended rehab, patient uncertain.  PLAN:  Continue diuresis and RT treatments. Started on ABX by pulm. PT to see patient again for rehab vs home eval.  CPM.   POD 5  HDS, SR, neuro intact, wounds CDI, abdomin S/NT + BM, fluid balance euvolemic, wt stable.  Plan:  PT to evaluate for home vs rehab today. BB,  No ACE EF normal, Statin, IS/ambulate. CPM.  POD 6 HDS, NSR, down to 2 l nc, on unasyn LLL pnuemonia.  PT cleared for home by PT. Continued L eye pain, intensivists having opthamologist see today.  Continuing IV abx for now.  Will re-eval for DC home in the am. CPM.  Home O2 ordered.  POD 7 HDS, NSR/SB, on 2 L NC, wts baseline, neg fluid balance, mild BLE edema.  On PO augmentin. PLAN: DC home today, home O2 arranged, walker ordered. Decrease amio to once daily, decrease lasix and changed to PO. DC prevena dressing. F/Us arranged.      Active Hospital Problems    Diagnosis   • Acute respiratory failure following trauma and surgery (HCC) [J95.821]     Priority: High     Pt on wean post-op CABG, Evaluated and re-evaluated me along with RT and nursing. He meets weaning parameters, he is awake,  alert, moves all 4 extremities, able to lift his head off the bed, minimal secretions, on minimal pressor, good cough and gag.     I will give patient a trial of extubation.      • Essential hypertension [I10]     Priority: High   • Red eye [H57.89]   • Pneumonia due to infectious organism [J18.9]   • Acute bronchitis [J20.9]     Acute bronchitis with possible LLL pneumonia although no fever and without antibiotics WBC is decreasing. Because of the copious secretions I have started empiric Unasyn to treat hospital acquired bronchitis (5 day course, can change to oral in 1-2 days pending clinical course)  - sputum sent for culture  - encourage mobility     • NSTEMI (non-ST elevated myocardial infarction) (HCC) [I21.4]   • Class 1 obesity due to excess calories without serious comorbidity with body mass index (BMI) of 30.0 to 30.9 in adult [E66.09, Z68.30]

## 2018-11-28 NOTE — CARE PLAN
Problem: Post Op Day 4 CABG/Heart Valve Replacement  Goal: Optimal care of the Post Op CABG/Heart Valve replacement Post Op Day 4  Outcome: PROGRESSING AS EXPECTED    Intervention: Daily Weights  Continuing    Intervention: Shower daily and clean incisions twice daily with soap and water  Completed. Prevena still in place, possible removal 11/28.  Intervention: Up in chair for all meals  Tolerates up in chair or edge of bed for all meals  Intervention: Ambulate, increasing the distance each time x 3 and before bed  Tolerating ambulation  Intervention: IS q 1 hour while awake and record best IS volume  Best volume 2250mL  Intervention: Consider removal of syed, chest tube and pacer wire if not already done  n/a      Problem: Pain Management  Goal: Pain level will decrease to patient's comfort goal  Outcome: PROGRESSING AS EXPECTED  C/o chest/ribcage pain secondary to coughing. Verbalizes pain using 0-10 scale. Orders in place for pain medication. Medicated as needed. Non-pharmacologic options discussed and offered.

## 2018-11-28 NOTE — DISCHARGE PLANNING
Agency/Facility Name: [facility]  Spoke To: [place]  Outcome: [place] [employer], informed her that patient has never had oxygen so he doesn't have any equipment and they should order it for him.

## 2018-11-28 NOTE — DISCHARGE PLANNING
Agency/Facility Name: [facility]  Spoke To: Graeme  Outcome: Informed I need to talk or leave a message for João @ [phone]     Agency/Facility Name: [facility]  Outcome: Left message for João, awaiting call back.    TANYA Sutherland informed.

## 2018-11-28 NOTE — DISCHARGE PLANNING
Anticipated Discharge Disposition: d/c home w/ O2     Action: Spoke to Bethany KILGORE. He will contact [facility] supervisor Graeme to f/u w/ O2 DME referral. Concerns listed: pt will need company who can deliver to [facility], will cover [place] when pt gets home and is accepted by Graeme's company for payment.    Barriers to Discharge: o2 delivery, transport    Plan: f/u w/ medical team, pt/wife

## 2018-11-28 NOTE — DISCHARGE PLANNING
Anticipated Discharge Disposition: d/c w/ DME    Action: spoke to cca to advise as previous notes indicate    Barriers to Discharge: TBD    Plan: f/u w/ d/c team

## 2018-11-28 NOTE — FACE TO FACE
Face to Face Note  -  Durable Medical Equipment    ALISTAIR Johnson - NPI: [id number]  I certify that this patient is under my care and that they had a durable medical equipment(DME)face to face encounter by myself that meets the physician DME face-to-face encounter requirements with this patient on:    Date of encounter:   Patient:                    MRN:                       YOB: 2018  Romario Chaudhari  [id number]  1950     The encounter with the patient was in whole, or in part, for the following medical condition, which is the primary reason for durable medical equipment:  Post-Op Surgery    I certify that, based on my findings, the following durable medical equipment is medically necessary:  Walkers.    HOME O2 Saturation Measurements:(Values must be present for Home Oxygen orders)  Room air sat at rest: 88  Room air sat with amb: 84  With liters of O2: 2, O2 sat at rest with O2: 95  With Liters of O2: 4, O2 sat with amb with O2 : 90  Is the patient mobile?: Yes    My Clinical findings support the need for the above equipment due to:  Abnormal Gait    Supporting Symptoms: s/p cabg

## 2018-11-28 NOTE — DISCHARGE PLANNING
Anticipated Discharge Disposition: d/c home to [place] w/ O2    Action: Yesterday, spoke to Heart [facility] RN Nakia. Pt has received a support service for this medical admission from his [employer] union, it appears. The INS has been changed to [employer] by [employer].     w/ this service Liza. LSw called this AM, 11/28/19 per Nakia's direction to locate the local O2 DME company contracted w/ this agency so pt can safely d/c home today.    LSW spoke to the manager, Christ. He has several suggestions/questions regarding the d/c plan including how pt will access Intensive Cardiac Rehab from his home in [place]. He questions if pt may be placed in an inpt rehab facility for safest d/c plan.    LSw provided phone to Nakia so she can discuss Intensive Cardiac Rehab which is a service she provides to heart pts.       Barriers to Discharge: TBD    Plan: pt d/c plan TBD

## 2018-11-28 NOTE — DISCHARGE PLANNING
Anticipated Discharge Disposition: d/c home w/ both O2 and walker    Action: Spoke to [facility] who indicates a second CM from Worker's comp has been placed in charge of acquiring DME equipment, Rea @ phone [phone] & fax [phone].    LSw spoke to Rea and explained process for acquiring O2 DME. She is unable to locate the order and face to face in the referral packet. LSW faxed her copies of the following: orders for both DME O2 New Set Up, and DME walker along with demographics so equipment can be provided to bedside and face to face to further the DME referral. Rea has this LSW contact information and will call w/ any further questions.     She seemed surprised pt will have to have equipment delivered to bedside. LSw explained pt will need this for transport home along w/ the accurate amount for the long drive so he is safe. Also, LSw added it is important the DMe delivery company in [place] provide concentrator upon pt's arrival at home otherwise he will be unsafe.       Barriers to Discharge: receipt of DMe for pt prior to d/c home    Plan: f/u w/ Rae, medical team, [facility]

## 2018-11-28 NOTE — PROGRESS NOTES
Critical Care Progress Note    Date of admission  11/19/2018    Chief Complaint  68 y.o. male admitted 11/19/2018 with s/p CABG    Hospital Course   Extubated in ICU about 4 hours post-operatively  Appears to have developed a LLL pneumonia at this time  On unasyn; sputum with many WBC and many mixed organisms, but no MRSA or pseudomonas  Right eye very red and irritated: ophtho consult  Switch antibx to augmentin  Waiting for home oxygen    Interval Problem Update  Close to going home, but needs home oxygen  Looks good walking halls    Previous 24 hour events Interval update:  Overall doing well POD#6  Eye very irritating  2250 on IS  Still with central line   Wants night time xanax    Review of Systems  Review of Systems   HENT:        Eye is better   Respiratory:        Cough and SOB are improved   Psychiatric/Behavioral: The patient does not have insomnia.    All other systems reviewed and are negative.       Vital Signs for last 24 hours   Temp:  [36.1 °C (97 °F)-37.1 °C (98.7 °F)] 36.7 °C (98.1 °F)  Pulse:  [60-69] 67  Resp:  [16-20] 16  BP: (103-106)/(68-73) 106/68    Hemodynamic parameters for last 24 hours   hemodynamically stable    Respiratory   4 liters NC    Physical Exam   Physical Exam   Constitutional: He is oriented to person, place, and time. He appears well-developed and well-nourished.    obese   HENT:   Head: Normocephalic and atraumatic.   Eyes: Pupils are equal, round, and reactive to light. EOM are normal.   Right eye is less red  I don't see any foreign bodies   Neck: Normal range of motion. Neck supple.   Obese neck, can't see neck veins   Cardiovascular: Normal rate and regular rhythm.    Distant heart sounds, no M/G/R   Pulmonary/Chest: Effort normal and breath sounds normal. No respiratory distress. He has no wheezes. He has no rales.   Overall decreased BS   Abdominal: Soft. Bowel sounds are normal. He exhibits distension. There is no tenderness.   obese   Musculoskeletal: Normal range  of motion. He exhibits no edema.   Trace edema   Neurological: He is alert and oriented to person, place, and time. He has normal reflexes.   Skin: Skin is warm and dry.   Psychiatric: His behavior is normal. Judgment and thought content normal.   Seems a little depressed today       Medications  Current Facility-Administered Medications   Medication Dose Route Frequency Provider Last Rate Last Dose   • amoxicillin-clavulanate (AUGMENTIN) 875-125 MG per tablet 1 Tab  1 Tab Oral Q12HRS Julio Michaels M.D.   1 Tab at 11/28/18 0542   • artificial tears 1.4 % ophthalmic solution 1 Drop  1 Drop Both Eyes Q2HRS PRN Julio Michaels M.D.   1 Drop at 11/27/18 1005   • ipratropium-albuterol (DUONEB) nebulizer solution  3 mL Nebulization Q4H PRN (RT) Mohan Verdin M.D.   3 mL at 11/26/18 2211   • amiodarone (CORDARONE) tablet 400 mg  400 mg Oral TWICE DAILY Graciela Street, A.P.N.   400 mg at 11/28/18 0542   • furosemide (LASIX) injection 40 mg  40 mg Intravenous BID DIURETIC Graciela Street, A.P.N.   40 mg at 11/28/18 0545   • potassium chloride SA (Kdur) tablet 20 mEq  20 mEq Oral BID Graciela Street, A.P.N.   20 mEq at 11/28/18 0544   • diphenhydrAMINE (BENADRYL) tablet/capsule 25 mg  25 mg Oral Q6HRS PRN Graciela Street, A.P.N.   25 mg at 11/26/18 2352   • HYDROcodone-acetaminophen (NORCO) 5-325 MG per tablet 1-2 Tab  1-2 Tab Oral Q4HRS PRN Mohan Verdin M.D.   2 Tab at 11/27/18 1658   • hydrocortisone 1 % cream   Topical PRN Mohan Verdin M.D.       • tramadol (ULTRAM) 50 MG tablet 50 mg  50 mg Oral Q6HRS PRN Graciela Street, A.P.N.   50 mg at 11/27/18 1117   • gabapentin (NEURONTIN) capsule 100 mg  100 mg Oral BID Graciela Duttont, A.P.N.   100 mg at 11/28/18 0541   • Respiratory Care per Protocol   Nebulization Continuous RT Graciela CARRIE. Jad, A.P.N.       • NS infusion   Intravenous Continuous Graciela Duttont, A.P.N. 30 mL/hr at 11/22/18 0123     • Pharmacy Consult Request ...Pain Management  Review 1 Each  1 Each Other PRN Graciela Street, A.P.N.       • senna-docusate (PERICOLACE or SENOKOT S) 8.6-50 MG per tablet 2 Tab  2 Tab Oral BID Graciela Street, A.P.N.   2 Tab at 11/28/18 0544    And   • polyethylene glycol/lytes (MIRALAX) PACKET 1 Packet  1 Packet Oral QDAY PRN Graciela Street, A.P.N.   1 Packet at 11/23/18 1217    And   • magnesium hydroxide (MILK OF MAGNESIA) suspension 30 mL  30 mL Oral QDAY PRN Graciela Street, A.P.N.        And   • bisacodyl (DULCOLAX) suppository 10 mg  10 mg Rectal QDAY PRN Graciela Street, A.P.N.       • enoxaparin (LOVENOX) inj 40 mg  40 mg Subcutaneous QDAY Graciela Street, A.P.N.   40 mg at 11/27/18 1657   • metoprolol (LOPRESSOR) tablet 25 mg  25 mg Oral BID Graciela Street, A.P.N.   25 mg at 11/28/18 0546   • clopidogrel (PLAVIX) tablet 75 mg  75 mg Oral DAILY Graciela Street, A.P.N.   75 mg at 11/28/18 0541   • oxyCODONE immediate-release (ROXICODONE) tablet 5 mg  5 mg Oral Q3HRS PRN Graciela Street, A.P.N.   5 mg at 11/26/18 1715   • oxyCODONE immediate release (ROXICODONE) tablet 10 mg  10 mg Oral Q3HRS PRN Graciela Street, A.P.N.   10 mg at 11/28/18 0542   • ondansetron (ZOFRAN) syringe/vial injection 4 mg  4 mg Intravenous Q6HRS PRN Graciela Duttont, A.P.N.   4 mg at 11/28/18 0637    Or   • prochlorperazine (COMPAZINE) injection 10 mg  10 mg Intravenous Q6HRS PRN Graciela Street, A.P.N.        Or   • promethazine (PHENERGAN) suppository 25 mg  25 mg Rectal Q6HRS PRN Graciela Street, A.P.N.       • acetaminophen (TYLENOL) tablet 650 mg  650 mg Oral Q4HRS PRN Graciela Duttont, A.P.N.   650 mg at 11/22/18 2134    Or   • acetaminophen (TYLENOL) suppository 650 mg  650 mg Rectal Q4HRS PRN Graciela Duttont, A.P.N.       • mag hydrox-al hydrox-simeth (MAALOX PLUS ES or MYLANTA DS) suspension 30 mL  30 mL Oral Q4HRS PRN Graciela Duttont, A.P.N.       • fentaNYL (SUBLIMAZE) injection 50 mcg  50 mcg Intravenous Q3HRS PRN Graciela Duttont, A.P.N.   50 mcg at  11/22/18 0926   • aspirin EC (ECOTRIN) tablet 81 mg  81 mg Oral DAILY Sd Ty M.D.   81 mg at 11/28/18 0545   • rosuvastatin (CRESTOR) tablet 20 mg  20 mg Oral Q EVENING Sd Ty M.D.   20 mg at 11/27/18 1700       Fluids    Intake/Output Summary (Last 24 hours) at 11/28/18 0718  Last data filed at 11/28/18 0640   Gross per 24 hour   Intake             1450 ml   Output             2050 ml   Net             -600 ml       Laboratory          Recent Labs      11/26/18   0530 11/27/18   0604  11/28/18   0540   SODIUM  135  135  134*   POTASSIUM  4.2  3.9  4.0   CHLORIDE  97  96  94*   CO2  30  27  30   BUN  27*  31*  28*   CREATININE  1.04  1.37  1.27   MAGNESIUM   --   2.1  2.3   PHOSPHORUS   --   4.8*  4.4   CALCIUM  9.0  8.9  9.0     Recent Labs      11/26/18   0530 11/27/18   0604  11/28/18   0540   ALTSGPT   --   32   --    ASTSGOT   --   31   --    ALKPHOSPHAT   --   164*   --    TBILIRUBIN   --   0.8   --    DBILIRUBIN   --   0.2   --    GLUCOSE  119*  117*  130*     Recent Labs      11/26/18   0530  11/27/18   0604  11/28/18   0540   WBC  13.6*  12.7*  13.0*   ASTSGOT   --   31   --    ALTSGPT   --   32   --    ALKPHOSPHAT   --   164*   --    TBILIRUBIN   --   0.8   --      Recent Labs      11/26/18   0530  11/27/18   0604  11/28/18   0540   RBC  3.78*  3.65*  3.95*   HEMOGLOBIN  12.4*  12.4*  13.0*   HEMATOCRIT  37.6*  36.6*  39.6*   PLATELETCT  363  398  510*       Imaging  X-Ray:  I have personally reviewed the images and compared with prior images. and My impression is: looks about the same as yesterday    Assessment/Plan  * NSTEMI (non-ST elevated myocardial infarction) ([facility])   Assessment & Plan    Now revascularized with CABG     Acute respiratory failure following trauma and surgery ([facility])   Assessment & Plan    Pt's high oxygen requirement largely driven by poor inspiratory effort with related poor lung volumes  But now appears to have pneumonia LLL  - doing better with increased  ambulation  - weaning oxygen but still needs 4 liters NC    With increased mobility much better lung expansion both clinically and radiographically. Now down to 4 liters. Still opacity/ atalectasis at left base but improving  Continue increasing activity  On nasal canula doing fairly well  Down to 2 liters NC, but will need home O2     Essential hypertension- (present on admission)   Assessment & Plan    Blood pressure OK for now on BB     Red eye   Assessment & Plan    Eye drops for now  I do not see any foreign bodies  To be seen by optho- as outpt after discharge     Pneumonia due to infectious organism   Assessment & Plan    On antibx  Sputum shows multiple organisms, but not MRSA or pneudomonas, so on unasyn  Follow CXR  Start following CRP/PCT for next few days  Should do well  PCT 0.14, so switch to augementin to finish course  4 more days antibx     Class 1 obesity due to excess calories without serious comorbidity with body mass index (BMI) of 30.0 to 30.9 in adult   Assessment & Plan    Chronic challenge for pt. His obesity also compromises his respiratory status          VTE:  Lovenox  Ulcer: Not Indicated  Lines: Central Line  Ongoing indication addressed  Line to be pulled prior to discharge    I have performed a physical exam and reviewed and updated ROS and Plan today (11/28/2018). In review of yesterday's note (11/27/2018), there are no changes except as documented above.     Discussed patient condition and risk of morbidity and/or mortality with Hospitalist, RN, RT, Therapies and Pharmacy  The pt looking fairly good for discharge with early followup  Level 2

## 2018-11-28 NOTE — DISCHARGE PLANNING
Anticipated Discharge Disposition: [facility] w[facility]ME x2 types    Action: Spoke to Rea corey/ [employer]. She is aware [facility] has the referral and the payor needs to contact both [facility] and [facility] to make payments for the equipment.    LSW updated RN Vivienne who will update bedside RN the O2 may not be delivered tonight as [facility] must accept the pt on service, the payor must setup payment, thent he equipment can be delivered.      Spoke to Bethany KILGORE, who will call [facility] O2 now and place note in EPIC regarding reason for delay in equipment receipt tonight.     Barriers to Discharge: receipt of equipment    Plan: f/u w/ Rea w/ [employer], [facility], medical team etc

## 2018-11-28 NOTE — PROGRESS NOTES
12hr chart check.  2 RN skin assessment completed.    Monitor Summary:  SR corey/ BBB  60s  .22/.12/.48

## 2018-11-28 NOTE — DISCHARGE PLANNING
Agency/Facility Name: [facility]  Spoke To: Devonte  Outcome: [employer] [place], needs referral to know if they can deliver everything to hospital.    Received Choice form at 0908  Agency/Facility Name: [facility]  Referral sent per Choice form @ 2322     Received Choice form at 1451  Agency/Facility Name: [facility]  Referral sent per Choice form @ 1455     TANYA Sutherland informed.

## 2018-11-28 NOTE — DISCHARGE SUMMARY
Discharge Summary    CHIEF COMPLAINT ON ADMISSION  Chief Complaint   Patient presents with   • Chest Pain     PREOPERATIVE DIAGNOSES:  Severe multivessel coronary artery disease, non-ST   elevation myocardial infarction, diabetes mellitus type 2, hypertension,   obesity.     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 Pnuemonia, hypoxia on RA, atrial fibrillation     PROCEDURES PERFORMED:  Coronary artery bypass grafting x3 (left internal   mammary artery to mid left anterior descending artery, reverse saphenous vein   graft to posterior descending artery, reverse saphenous vein graft to diagonal   branch), endoscopic vein harvesting, excision of mediastinal mass,   transesophageal echocardiography.    Reason for Admission  EMS     Admission Date  11/19/2018    CODE STATUS  Full Code    HPI & HOSPITAL COURSE  This is a 68-year-old man who presented with   severe acute recurrent chest pain, was found to have non-ST elevation   myocardial infarction and severe multivessel coronary artery disease.  On   coronary angiogram, he was found to have a high-grade proximal left anterior   descending artery stenosis, high-grade second diagonal branch mid stenosis,   and high-grade mid right coronary artery stenosis.  Ejection fraction was   estimated at 55% with distal apical and diaphragmatic hypokinesis and LVEDP of   20.  The patient was subsequently referred to Dr. Verdin for coronary artery bypass   grafting.      POD 1 Extubated, on HF NC.  CXR unchanged.  On Mod dose epi, CVP 3, +2.6L.  Episodes Hotn with coughing.  CT output min, no airleka.  PLAN:  Albumin bolus/caCl.  Add low dose bryan, wean down epi.  Discussed recent bronchitis/ABX with pulm--tx deferred to them.  Will start diuresis when BP allows. DC mediastinal CTs  POD 2 NSR, HDS off pressors, NC off high  flow, feeling better today. +4L, edema.  Passing gas, no BM. PLAN:  DC wires/eran/foely.  Diurese.  AMB/IS  POD 3 HDS, afib last night, amio gtt and protocol started, now back in NSR. Diuresing well, lower O2 requirements, passing gas, no BM yet. PLAN: COntinue diuresis.  CHange to PO amio. 2 view CXR today.  Bowel protocol.  AMB/IS.  POD 4 HDS, NSR, 4 L NC.  Bad coughing last night, but better this AM.  Had BM.  Prior PT note has recommended rehab, patient uncertain.  PLAN:  Continue diuresis and RT treatments. Started on ABX by pulm. PT to see patient again for rehab vs home eval.  CPM.   POD 5  HDS, SR, neuro intact, wounds CDI, abdomin S/NT + BM, fluid balance euvolemic, wt stable.  Plan:  PT to evaluate for home vs rehab today. BB,  No ACE EF normal, Statin, IS/ambulate. CPM.  POD 6 HDS, NSR, down to 2 l nc, on unasyn LLL pnuemonia.  PT cleared for home by PT. Continued L eye pain, intensivists having opthamologist see today.  Continuing IV abx for now.  Will re-eval for DC home in the am. CPM.  Home O2 ordered.  POD 7 HDS, NSR/SB, on 2 L NC, wts baseline, neg fluid balance, mild BLE edema.  On PO augmentin. PLAN: DC home today, home O2 arranged, walker ordered. Decrease amio to once daily, decrease lasix and changed to PO. DC prevena dressing. F/Us arranged.         Therefore, he is discharged in good and stable condition to home with close outpatient follow-up.    The patient met 2-midnight criteria for an inpatient stay at the time of discharge.    Discharge Date  11/28/18    FOLLOW UP ITEMS POST DISCHARGE  Cardiology 1-2 weeks  Cardiothoracic Surgery 1 month  PCP as previously directed    DISCHARGE DIAGNOSES  Principal Problem:    NSTEMI (non-ST elevated myocardial infarction) (HCC) POA: Unknown  Active Problems:    Essential hypertension POA: Yes    Acute respiratory failure following trauma and surgery (HCC) POA: No      Overview: Pt on wean post-op CABG, Evaluated and re-evaluated me along with RT and        nursing. He meets weaning parameters, he is awake, alert, moves all 4       extremities, able to lift his head off the bed, minimal secretions, on       minimal pressor, good cough and gag.             I will give patient a trial of extubation.     Class 1 obesity due to excess calories without serious comorbidity with body mass index (BMI) of 30.0 to 30.9 in adult POA: Unknown    Acute bronchitis POA: Unknown      Overview: Acute bronchitis with possible LLL pneumonia although no fever and without       antibiotics WBC is decreasing. Because of the copious secretions I have       started empiric Unasyn to treat hospital acquired bronchitis (5 day       course, can change to oral in 1-2 days pending clinical course)      - sputum sent for culture      - encourage mobility    Pneumonia due to infectious organism POA: No    Red eye POA: Unknown  Resolved Problems:    * No resolved hospital problems. *      FOLLOW UP  Future Appointments  Date Time Provider Department Center   12/4/2018 8:20 AM Santa Sands M.D. CB None     TELEMED [address] [address]    Go on 12/4/2018  Please report to [facility] for a telehealth follow up with cardiology Dr Sands.  Check-in at 8am for pre-appointment EKG.  Thank you!    Mohan Verdin M.D.  [address] [place] [id number]  [phone]    Go on 12/17/2018  Please follow up with your cardiac surgeon Dr. Verdin on 12/17/18 at 2:45pm      MEDICATIONS ON DISCHARGE     Medication List      START taking these medications      Instructions   amiodarone 400 MG tablet  Commonly known as:  PACERONE   Take 1 Tab by mouth every day.  Dose:  400 mg     amoxicillin-clavulanate 875-125 MG Tabs  Commonly known as:  AUGMENTIN   Take 1 Tab by mouth every 12 hours.  Dose:  1 Tab     artificial tears 1.4 % Soln   Place 1 Drop in both eyes every 2 hours as needed (eye irritation).  Dose:  1 Drop     clopidogrel 75 MG  Tabs  Start taking on:  11/29/2018  Commonly known as:  PLAVIX   Take 1 Tab by mouth every day.  Dose:  75 mg     furosemide 40 MG Tabs  Commonly known as:  LASIX   Take 1 Tab by mouth every day.  Dose:  40 mg     metoprolol 25 MG Tabs  Commonly known as:  LOPRESSOR   Take 1 Tab by mouth 2 Times a Day.  Dose:  25 mg     oxyCODONE immediate release 10 MG immediate release tablet  Commonly known as:  ROXICODONE   Take 1 Tab by mouth every four hours as needed for up to 7 days.  Dose:  10 mg     potassium chloride SA 10 MEQ Tbcr  Commonly known as:  K-DUR   Take 1 Tab by mouth every day.  Dose:  10 mEq     rosuvastatin 20 MG Tabs  Commonly known as:  CRESTOR   Take 1 Tab by mouth every evening.  Dose:  20 mg        CONTINUE taking these medications      Instructions   aspirin 81 MG tablet   Take 81 mg by mouth every day.  Dose:  81 mg     gabapentin 100 MG Caps  Commonly known as:  NEURONTIN   Take 100 mg by mouth 2 Times a Day.  Dose:  100 mg        STOP taking these medications    lisinopril 20 MG Tabs  Commonly known as:  PRINIVIL     verapamil 40 MG Tabs  Commonly known as:  ISOPTIN          Use of narcotics and risks associated reviewed with patient.  [place]  checked.   Discussed weaning.    Patient verbalizes understanding.    Allergies  No Known Allergies    DIET  Orders Placed This Encounter   Procedures   • Diet Order Cardiac, Consistent Carbohydrate     Standing Status:   Standing     Number of Occurrences:   1     Order Specific Question:   Diet:     Answer:   Cardiac [6]     Order Specific Question:   Diet:     Answer:   Consistent Carbohydrate [4]       ACTIVITY  As tolerated.  Weight bearing as tolerated     No Driving x 1 month   No heavy lifting/pushing/pulling >10# x 2 months    Wash incisions soap/water, pat dry, do not use ointments  Report s/s infection including warmth/redness/oozing or fever/chills    LABORATORY  Lab Results   Component Value Date    SODIUM 134 (L) 11/28/2018    POTASSIUM 4.0  11/28/2018    CHLORIDE 94 (L) 11/28/2018    CO2 30 11/28/2018    GLUCOSE 130 (H) 11/28/2018    BUN 28 (H) 11/28/2018    CREATININE 1.27 11/28/2018        Lab Results   Component Value Date    WBC 13.0 (H) 11/28/2018    HEMOGLOBIN 13.0 (L) 11/28/2018    HEMATOCRIT 39.6 (L) 11/28/2018    PLATELETCT 510 (H) 11/28/2018      Discussed when to seek emergency medical care  Questions and concerns addressed.  Patient verbalizes understanding and agrees to plan of care    Total time of the discharge process exceeds 40 minutes.

## 2018-11-28 NOTE — DISCHARGE PLANNING
Received Choice form at 1000  Agency/Facility Name: [facility]  Referral sent per Choice form @ 1000

## 2018-11-28 NOTE — DISCHARGE PLANNING
Anticipated Discharge Disposition: [facility] in [place] then drive home w/ walker and O2 concentrator and O2 tanks    Action: LSw acquired completed choice form for walker from pt. Wife and pt placed their contact cell numbers on form.    LSw faxed to Rea corey/ [employer] and left VM indicating [facility]. The equipment that will need to be delivered listed as follows: portable tanks enough for transport home and around town, concentrator, and walker. The couple has requested the contact number for the O2 supply company in [place] so they can make sure to keep in touch w/ them for all equipment needs.    Bedside RN present during discussion so aware of situation above as well.     Lsw requested Rea call the couple to set up complete needs of DMe for pt prior to d/c from [facility].     Barriers to Discharge: acceptance and receipt of all DMe O2 and walker    Plan: pt will be able to leave once DMe is received for safe d/c,

## 2018-11-28 NOTE — DISCHARGE PLANNING
Anticipated Discharge Disposition: d/c home w/ O2    Action: Received update from [facility].    Updated bedside RN indicating waiting for [facility] to start process of acquiring O2 for pt prior to his d/c home. May not happen today as this AM LSW advised by  JONATHAN De Jesus was out in the field for the day.    Lsw spoke to charge RN as above.    Lsw left VM w/ CAT Dunn requesting to staff O2 d/c case.     Barriers to Discharge: acceptance by DME company once payor has chosen a company to send referral to    Plan: f/u w/ medical team

## 2018-11-29 ENCOUNTER — APPOINTMENT (OUTPATIENT)
Dept: RADIOLOGY | Facility: MEDICAL CENTER | Age: 68
DRG: 233 | End: 2018-11-29
Attending: INTERNAL MEDICINE
Payer: COMMERCIAL

## 2018-11-29 VITALS
SYSTOLIC BLOOD PRESSURE: 106 MMHG | BODY MASS INDEX: 30.04 KG/M2 | TEMPERATURE: 98.5 F | DIASTOLIC BLOOD PRESSURE: 72 MMHG | HEIGHT: 73 IN | RESPIRATION RATE: 16 BRPM | WEIGHT: 226.63 LBS | OXYGEN SATURATION: 92 % | HEART RATE: 70 BPM

## 2018-11-29 PROCEDURE — 700102 HCHG RX REV CODE 250 W/ 637 OVERRIDE(OP): Performed by: NURSE PRACTITIONER

## 2018-11-29 PROCEDURE — A9270 NON-COVERED ITEM OR SERVICE: HCPCS | Performed by: INTERNAL MEDICINE

## 2018-11-29 PROCEDURE — 700102 HCHG RX REV CODE 250 W/ 637 OVERRIDE(OP): Performed by: INTERNAL MEDICINE

## 2018-11-29 PROCEDURE — 99232 SBSQ HOSP IP/OBS MODERATE 35: CPT | Performed by: INTERNAL MEDICINE

## 2018-11-29 PROCEDURE — 700111 HCHG RX REV CODE 636 W/ 250 OVERRIDE (IP): Performed by: NURSE PRACTITIONER

## 2018-11-29 PROCEDURE — 71045 X-RAY EXAM CHEST 1 VIEW: CPT

## 2018-11-29 PROCEDURE — A9270 NON-COVERED ITEM OR SERVICE: HCPCS | Performed by: NURSE PRACTITIONER

## 2018-11-29 RX ADMIN — OXYCODONE HYDROCHLORIDE 10 MG: 10 TABLET ORAL at 06:00

## 2018-11-29 RX ADMIN — AMOXICILLIN AND CLAVULANATE POTASSIUM 1 TABLET: 875; 125 TABLET, FILM COATED ORAL at 05:47

## 2018-11-29 RX ADMIN — POTASSIUM CHLORIDE 20 MEQ: 1500 TABLET, EXTENDED RELEASE ORAL at 05:45

## 2018-11-29 RX ADMIN — FUROSEMIDE 40 MG: 10 INJECTION, SOLUTION INTRAMUSCULAR; INTRAVENOUS at 05:47

## 2018-11-29 RX ADMIN — AMIODARONE HYDROCHLORIDE 400 MG: 200 TABLET ORAL at 05:43

## 2018-11-29 RX ADMIN — STANDARDIZED SENNA CONCENTRATE AND DOCUSATE SODIUM 2 TABLET: 8.6; 5 TABLET, FILM COATED ORAL at 05:47

## 2018-11-29 RX ADMIN — GABAPENTIN 100 MG: 100 CAPSULE ORAL at 05:45

## 2018-11-29 RX ADMIN — CLOPIDOGREL 75 MG: 75 TABLET, FILM COATED ORAL at 05:39

## 2018-11-29 RX ADMIN — OXYCODONE HYDROCHLORIDE 10 MG: 10 TABLET ORAL at 09:30

## 2018-11-29 RX ADMIN — ASPIRIN 81 MG: 81 TABLET, COATED ORAL at 05:38

## 2018-11-29 RX ADMIN — OXYCODONE HYDROCHLORIDE 10 MG: 10 TABLET ORAL at 00:00

## 2018-11-29 ASSESSMENT — PAIN SCALES - GENERAL
PAINLEVEL_OUTOF10: 10
PAINLEVEL_OUTOF10: 8
PAINLEVEL_OUTOF10: 5
PAINLEVEL_OUTOF10: 8
PAINLEVEL_OUTOF10: 6
PAINLEVEL_OUTOF10: 6

## 2018-11-29 ASSESSMENT — ENCOUNTER SYMPTOMS: INSOMNIA: 0

## 2018-11-29 NOTE — PROGRESS NOTES
"Pt anticipated to discharge today, still awaiting oxygen. Per  (Ena), pt's payor has changed DME providers and decided to use [facility]. Pt was in communication with [employer] provider (Rea) who guaranteed the pt that [facility] would deliver the oxygen to the pt tonight prior to discharge. No time has been estimated for [facility] arrival despite multiple calls. Pt anxious to leave stating he \"will not spend another night in this hospital bed.\"      "

## 2018-11-29 NOTE — DISCHARGE SUMMARY
Discharge Summary    CHIEF COMPLAINT ON ADMISSION  Chief Complaint   Patient presents with   • Chest Pain     PREOPERATIVE DIAGNOSES:  Severe multivessel coronary artery disease, non-ST   elevation myocardial infarction, diabetes mellitus type 2, hypertension,   obesity.     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 Pnuemonia, hypoxia on RA, atrial fibrillation     PROCEDURES PERFORMED:  Coronary artery bypass grafting x3 (left internal   mammary artery to mid left anterior descending artery, reverse saphenous vein   graft to posterior descending artery, reverse saphenous vein graft to diagonal   branch), endoscopic vein harvesting, excision of mediastinal mass,   transesophageal echocardiography.    Reason for Admission  EMS     Admission Date  11/19/2018    CODE STATUS  Full Code    HPI & HOSPITAL COURSE  This is a 68-year-old man who presented with   severe acute recurrent chest pain, was found to have non-ST elevation   myocardial infarction and severe multivessel coronary artery disease.  On   coronary angiogram, he was found to have a high-grade proximal left anterior   descending artery stenosis, high-grade second diagonal branch mid stenosis,   and high-grade mid right coronary artery stenosis.  Ejection fraction was   estimated at 55% with distal apical and diaphragmatic hypokinesis and LVEDP of   20.  The patient was subsequently referred to Dr. Verdin for coronary artery bypass   grafting.      POD 1 Extubated, on HF NC.  CXR unchanged.  On Mod dose epi, CVP 3, +2.6L.  Episodes Hotn with coughing.  CT output min, no airleka.  PLAN:  Albumin bolus/caCl.  Add low dose bryan, wean down epi.  Discussed recent bronchitis/ABX with pulm--tx deferred to them.  Will start diuresis when BP allows. DC mediastinal CTs  POD 2 NSR, HDS off pressors, NC off high  flow, feeling better today. +4L, edema.  Passing gas, no BM. PLAN:  DC wires/eran/foely.  Diurese.  AMB/IS  POD 3 HDS, afib last night, amio gtt and protocol started, now back in NSR. Diuresing well, lower O2 requirements, passing gas, no BM yet. PLAN: COntinue diuresis.  CHange to PO amio. 2 view CXR today.  Bowel protocol.  AMB/IS.  POD 4 HDS, NSR, 4 L NC.  Bad coughing last night, but better this AM.  Had BM.  Prior PT note has recommended rehab, patient uncertain.  PLAN:  Continue diuresis and RT treatments. Started on ABX by pulm. PT to see patient again for rehab vs home eval.  CPM.   POD 5  HDS, SR, neuro intact, wounds CDI, abdomin S/NT + BM, fluid balance euvolemic, wt stable.  Plan:  PT to evaluate for home vs rehab today. BB,  No ACE EF normal, Statin, IS/ambulate. CPM.  POD 6 HDS, NSR, down to 2 l nc, on unasyn LLL pnuemonia.  PT cleared for home by PT. Continued L eye pain, intensivists having opthamologist see today.  Continuing IV abx for now.  Will re-eval for DC home in the am. CPM.  Home O2 ordered.  POD 7 HDS, NSR/SB, on 2 L NC, wts baseline, neg fluid balance, mild BLE edema.  On PO augmentin. PLAN: DC home today, home O2 arranged, walker ordered. Decrease amio to once daily, decrease lasix and changed to PO. DC prevena dressing. F/Us arranged.    POD 8 off oxygen.  Discharge home.  No medication changes.  No need for oxygen.        Therefore, he is discharged in good and stable condition to home with close outpatient follow-up.    The patient met 2-midnight criteria for an inpatient stay at the time of discharge.    Discharge Date  11/28/18    FOLLOW UP ITEMS POST DISCHARGE  Cardiology 1-2 weeks  Cardiothoracic Surgery 1 month  PCP as previously directed    DISCHARGE DIAGNOSES  Principal Problem:    NSTEMI (non-ST elevated myocardial infarction) (HCC) POA: Unknown  Active Problems:    Essential hypertension POA: Yes    Acute respiratory failure following trauma and surgery (HCC) POA: No       Overview: Pt on wean post-op CABG, Evaluated and re-evaluated me along with RT and       nursing. He meets weaning parameters, he is awake, alert, moves all 4       extremities, able to lift his head off the bed, minimal secretions, on       minimal pressor, good cough and gag.             I will give patient a trial of extubation.     Class 1 obesity due to excess calories without serious comorbidity with body mass index (BMI) of 30.0 to 30.9 in adult POA: Unknown    Acute bronchitis POA: Unknown      Overview: Acute bronchitis with possible LLL pneumonia although no fever and without       antibiotics WBC is decreasing. Because of the copious secretions I have       started empiric Unasyn to treat hospital acquired bronchitis (5 day       course, can change to oral in 1-2 days pending clinical course)      - sputum sent for culture      - encourage mobility    Pneumonia due to infectious organism POA: No    Red eye POA: Unknown  Resolved Problems:    * No resolved hospital problems. *      FOLLOW UP  Future Appointments  Date Time Provider Department Center   12/4/2018 8:20 AM Santa Sands M.D. [facility] None     Mohan Verdin M.D.  [address] [address]  [phone]    Go on 12/17/2018  Please follow up with your cardiac surgeon Dr. Verdin on 12/17/18 at 2:45pm    [facility]- Dr Teodoro Parry  [address] [place] [id number]  [phone]  Go on 12/11/2018  Please arrive at 10:45 am for your appointment at 11 am . Thank you       MEDICATIONS ON DISCHARGE     Medication List      START taking these medications      Instructions   amiodarone 400 MG tablet  Commonly known as:  PACERONE   Take 1 Tab by mouth every day.  Dose:  400 mg     amoxicillin-clavulanate 875-125 MG Tabs  Commonly known as:  AUGMENTIN   Take 1 Tab by mouth every 12 hours.  Dose:  1 Tab     artificial tears 1.4 % Soln   Place 1 Drop in both eyes every 2 hours as needed (eye irritation).  Dose:  1 Drop      clopidogrel 75 MG Tabs  Commonly known as:  PLAVIX   Take 1 Tab by mouth every day.  Dose:  75 mg     furosemide 40 MG Tabs  Commonly known as:  LASIX   Take 1 Tab by mouth every day.  Dose:  40 mg     metoprolol 25 MG Tabs  Commonly known as:  LOPRESSOR   Take 1 Tab by mouth 2 Times a Day.  Dose:  25 mg     oxyCODONE immediate release 10 MG immediate release tablet  Commonly known as:  ROXICODONE   Take 1 Tab by mouth every four hours as needed for up to 7 days.  Dose:  10 mg     potassium chloride SA 10 MEQ Tbcr  Commonly known as:  K-DUR   Take 1 Tab by mouth every day.  Dose:  10 mEq     rosuvastatin 20 MG Tabs  Commonly known as:  CRESTOR   Take 1 Tab by mouth every evening.  Dose:  20 mg        CONTINUE taking these medications      Instructions   aspirin 81 MG tablet   Take 81 mg by mouth every day.  Dose:  81 mg     gabapentin 100 MG Caps  Commonly known as:  NEURONTIN   Take 100 mg by mouth 2 Times a Day.  Dose:  100 mg        STOP taking these medications    lisinopril 20 MG Tabs  Commonly known as:  PRINIVIL     verapamil 40 MG Tabs  Commonly known as:  ISOPTIN          Use of narcotics and risks associated reviewed with patient.  Estelle POLO checked.   Discussed weaning.    Patient verbalizes understanding.    Allergies  No Known Allergies    DIET  Orders Placed This Encounter   Procedures   • Diet Order Cardiac, Consistent Carbohydrate     Standing Status:   Standing     Number of Occurrences:   1     Order Specific Question:   Diet:     Answer:   Cardiac [6]     Order Specific Question:   Diet:     Answer:   Consistent Carbohydrate [4]       ACTIVITY  As tolerated.  Weight bearing as tolerated     No Driving x 1 month   No heavy lifting/pushing/pulling >10# x 2 months    Wash incisions soap/water, pat dry, do not use ointments  Report s/s infection including warmth/redness/oozing or fever/chills    LABORATORY  Lab Results   Component Value Date    SODIUM 134 (L) 11/28/2018    POTASSIUM 4.0 11/28/2018     CHLORIDE 94 (L) 11/28/2018    CO2 30 11/28/2018    GLUCOSE 130 (H) 11/28/2018    BUN 28 (H) 11/28/2018    CREATININE 1.27 11/28/2018        Lab Results   Component Value Date    WBC 13.0 (H) 11/28/2018    HEMOGLOBIN 13.0 (L) 11/28/2018    HEMATOCRIT 39.6 (L) 11/28/2018    PLATELETCT 510 (H) 11/28/2018      Discussed when to seek emergency medical care  Questions and concerns addressed.  Patient verbalizes understanding and agrees to plan of care    Total time of the discharge process exceeds 40 minutes.

## 2018-11-29 NOTE — DISCHARGE PLANNING
Medical Social Work    RN's note mentioned [facility] as O2 provider. LSW called to see if they had received call from workers comp. LSW spoke with on- who stated they have nothing in system for this pt.     Awaiting response from [facility].

## 2018-11-29 NOTE — PROGRESS NOTES
Discharged education provided to pt and wife.  All questions answered, pt received a copy of DC instructions.

## 2018-11-29 NOTE — DISCHARGE PLANNING
Agency/Facility Name: [facility]  Spoke To: Rose  Outcome: Per Rose, she has received the referral and will update [facility] on acceptance.

## 2018-11-29 NOTE — DISCHARGE PLANNING
Anticipated Discharge Disposition: pt to d/c w/ dme for o2 concentrator and portable tanks    Action: Spoke to charge. Pt did not receive his o2 equipment last night. He is now titrated down to .5 liter, but unable to completely wean off.    Left  w/ [facility] requested f/u.     Lsw called [facility] they are not open until 8am. LSw spoke to after hours . LSw will call again at 8am.    Barriers to Discharge: receipt of equipment    Plan: f/u w/ [facility], claudia, medical team

## 2018-11-29 NOTE — DISCHARGE PLANNING
Agency/Facility Name: [facility]  Spoke To: Lissy  Outcome: Per Lissy, Facility will call [facility] once they have opened.    TANYA geller    @[phone] Agency/Facility Name: [facility]  Spoke To: Destin  Outcome: Per Destin, They have received order and referral and are currently working on it. [facility] Advised to call back in an hour for update.   Fall with Harm Risk

## 2018-11-29 NOTE — PROGRESS NOTES
Educated pt on importance of IS. Pt appears more motivated.  O2 titrated off. Sp02 89-94% on room air.

## 2018-11-29 NOTE — PROGRESS NOTES
Critical Care Progress Note    Date of admission  11/19/2018    Chief Complaint  68 y.o. male admitted 11/19/2018 with s/p CABG    Hospital Course   Extubated in ICU about 4 hours post-operatively  Appears to have developed a LLL pneumonia at this time  On unasyn; sputum with many WBC and many mixed organisms, but no MRSA or pseudomonas  Right eye very red and irritated: ophtho consult  Switch antibx to augmentin  Waiting for home oxygen    Interval Problem Update  Close to going home, but needs home oxygen  Looks good walking halls    Previous 24 hour events Interval update:  Overall doing well POD#6  Eye very irritating  2250 on IS  Still with central line   Wants night time xanax    Review of Systems  Review of Systems   HENT:        Eye is better   Respiratory:        Cough and SOB are improved   Psychiatric/Behavioral: The patient does not have insomnia.    All other systems reviewed and are negative.       Vital Signs for last 24 hours   Temp:  [37 °C (98.6 °F)-37.2 °C (99 °F)] 37.2 °C (99 °F)  Pulse:  [53-75] 61  Resp:  [11-20] 16  BP: (106-123)/(72-75) 106/72    Hemodynamic parameters for last 24 hours   hemodynamically stable    Respiratory   4 liters NC    Physical Exam   Physical Exam   Constitutional: He is oriented to person, place, and time. He appears well-developed and well-nourished.    obese   HENT:   Head: Normocephalic and atraumatic.   Eyes: Pupils are equal, round, and reactive to light. EOM are normal.   Right eye is less red  I don't see any foreign bodies   Neck: Normal range of motion. Neck supple.   Obese neck, can't see neck veins   Cardiovascular: Normal rate and regular rhythm.    Distant heart sounds, no M/G/R   Pulmonary/Chest: Effort normal and breath sounds normal. No respiratory distress. He has no wheezes. He has no rales.   Overall decreased BS   Abdominal: Soft. Bowel sounds are normal. He exhibits distension. There is no tenderness.   obese   Musculoskeletal: Normal range of  motion. He exhibits no edema.   Trace edema   Neurological: He is alert and oriented to person, place, and time. He has normal reflexes.   Skin: Skin is warm and dry.   Psychiatric: His behavior is normal. Judgment and thought content normal.   Seems a little depressed today       Medications  Current Facility-Administered Medications   Medication Dose Route Frequency Provider Last Rate Last Dose   • amoxicillin-clavulanate (AUGMENTIN) 875-125 MG per tablet 1 Tab  1 Tab Oral Q12HRS Julio Michaels M.D.   1 Tab at 11/29/18 0547   • artificial tears 1.4 % ophthalmic solution 1 Drop  1 Drop Both Eyes Q2HRS PRN Julio Michaels M.D.   1 Drop at 11/27/18 1005   • ipratropium-albuterol (DUONEB) nebulizer solution  3 mL Nebulization Q4H PRN (RT) Mohan Verdin M.D.   3 mL at 11/26/18 2211   • amiodarone (CORDARONE) tablet 400 mg  400 mg Oral TWICE DAILY Graciela Street, A.P.N.   400 mg at 11/29/18 0543   • furosemide (LASIX) injection 40 mg  40 mg Intravenous BID DIURETIC Graciela Street, A.P.N.   40 mg at 11/29/18 0547   • potassium chloride SA (Kdur) tablet 20 mEq  20 mEq Oral BID Graciela Street, A.P.N.   20 mEq at 11/29/18 0545   • diphenhydrAMINE (BENADRYL) tablet/capsule 25 mg  25 mg Oral Q6HRS PRN Graciela Street, A.P.N.   25 mg at 11/28/18 2350   • HYDROcodone-acetaminophen (NORCO) 5-325 MG per tablet 1-2 Tab  1-2 Tab Oral Q4HRS PRN Mohan Verdin M.D.   2 Tab at 11/27/18 1658   • hydrocortisone 1 % cream   Topical PRN oMhan Verdin M.D.       • tramadol (ULTRAM) 50 MG tablet 50 mg  50 mg Oral Q6HRS PRN Graciela Street, A.P.N.   50 mg at 11/27/18 1117   • gabapentin (NEURONTIN) capsule 100 mg  100 mg Oral BID Graciela Street, A.P.N.   100 mg at 11/29/18 2855   • Respiratory Care per Protocol   Nebulization Continuous RT Graciela Street, A.P.N.       • NS infusion   Intravenous Continuous Graciela Street, A.P.N.   Stopped at 11/28/18 1900   • Pharmacy Consult Request ...Pain Management Review 1  Each  1 Each Other PRN Graciela Street, A.P.N.       • senna-docusate (PERICOLACE or SENOKOT S) 8.6-50 MG per tablet 2 Tab  2 Tab Oral BID Graciela Street, A.P.N.   2 Tab at 11/29/18 0547    And   • polyethylene glycol/lytes (MIRALAX) PACKET 1 Packet  1 Packet Oral QDAY PRN Graciela Street, A.P.N.   1 Packet at 11/23/18 1217    And   • magnesium hydroxide (MILK OF MAGNESIA) suspension 30 mL  30 mL Oral QDAY PRN Graciela Street, A.P.N.        And   • bisacodyl (DULCOLAX) suppository 10 mg  10 mg Rectal QDAY PRN Graciela Street, A.P.N.       • enoxaparin (LOVENOX) inj 40 mg  40 mg Subcutaneous QDAY Graciela Street, A.P.N.   40 mg at 11/28/18 1840   • metoprolol (LOPRESSOR) tablet 25 mg  25 mg Oral BID Graciela Street, A.P.N.   Stopped at 11/29/18 0553   • clopidogrel (PLAVIX) tablet 75 mg  75 mg Oral DAILY Graciela Street, A.P.N.   75 mg at 11/29/18 0539   • oxyCODONE immediate-release (ROXICODONE) tablet 5 mg  5 mg Oral Q3HRS PRN Graciela Street, A.P.N.   5 mg at 11/26/18 1715   • oxyCODONE immediate release (ROXICODONE) tablet 10 mg  10 mg Oral Q3HRS PRN Graciela Street, A.P.N.   10 mg at 11/29/18 0600   • ondansetron (ZOFRAN) syringe/vial injection 4 mg  4 mg Intravenous Q6HRS PRN Graciela Street, A.P.N.   4 mg at 11/28/18 0637    Or   • prochlorperazine (COMPAZINE) injection 10 mg  10 mg Intravenous Q6HRS PRN Graciela Street, A.P.N.        Or   • promethazine (PHENERGAN) suppository 25 mg  25 mg Rectal Q6HRS PRN Graciela Street, A.P.N.       • acetaminophen (TYLENOL) tablet 650 mg  650 mg Oral Q4HRS PRN Graciela Duttont, A.P.N.   650 mg at 11/22/18 2134    Or   • acetaminophen (TYLENOL) suppository 650 mg  650 mg Rectal Q4HRS PRN Graciela Street, A.P.N.       • mag hydrox-al hydrox-simeth (MAALOX PLUS ES or MYLANTA DS) suspension 30 mL  30 mL Oral Q4HRS PRN Graciela Street, A.P.N.       • fentaNYL (SUBLIMAZE) injection 50 mcg  50 mcg Intravenous Q3HRS PRN Graciela Street, A.P.N.   50 mcg at  11/22/18 0926   • aspirin EC (ECOTRIN) tablet 81 mg  81 mg Oral DAILY Sd Ty M.D.   81 mg at 11/29/18 0538   • rosuvastatin (CRESTOR) tablet 20 mg  20 mg Oral Q EVENING Sd Ty M.D.   20 mg at 11/28/18 1832       Fluids    Intake/Output Summary (Last 24 hours) at 11/29/18 0646  Last data filed at 11/29/18 0600   Gross per 24 hour   Intake              700 ml   Output             1515 ml   Net             -815 ml       Laboratory          Recent Labs      11/27/18   0604  11/28/18   0540   SODIUM  135  134*   POTASSIUM  3.9  4.0   CHLORIDE  96  94*   CO2  27  30   BUN  31*  28*   CREATININE  1.37  1.27   MAGNESIUM  2.1  2.3   PHOSPHORUS  4.8*  4.4   CALCIUM  8.9  9.0     Recent Labs      11/27/18   0604  11/28/18   0540   ALTSGPT  32   --    ASTSGOT  31   --    ALKPHOSPHAT  164*   --    TBILIRUBIN  0.8   --    DBILIRUBIN  0.2   --    GLUCOSE  117*  130*     Recent Labs      11/27/18   0604  11/28/18   0540   WBC  12.7*  13.0*   ASTSGOT  31   --    ALTSGPT  32   --    ALKPHOSPHAT  164*   --    TBILIRUBIN  0.8   --      Recent Labs      11/27/18   0604  11/28/18   0540   RBC  3.65*  3.95*   HEMOGLOBIN  12.4*  13.0*   HEMATOCRIT  36.6*  39.6*   PLATELETCT  398  510*       Imaging  X-Ray:  I have personally reviewed the images and compared with prior images. and My impression is: looks about the same as yesterday    Assessment/Plan  * NSTEMI (non-ST elevated myocardial infarction) ([facility])   Assessment & Plan    Now revascularized with CABG     Acute respiratory failure following trauma and surgery ([facility])   Assessment & Plan    Pt's high oxygen requirement largely driven by poor inspiratory effort with related poor lung volumes  But now appears to have pneumonia LLL  - doing better with increased ambulation  - weaning oxygen but still needs 4 liters NC    With increased mobility much better lung expansion both clinically and radiographically. Now down to 4 liters. Still opacity/ atalectasis at left base  but improving  Continue increasing activity  On nasal canula doing fairly well  Down to 2 liters NC, but will need home O2     Essential hypertension- (present on admission)   Assessment & Plan    Blood pressure OK for now on BB     Red eye   Assessment & Plan    Eye drops for now  I do not see any foreign bodies  To be seen by optho- as outpt after discharge     Pneumonia due to infectious organism   Assessment & Plan    On antibx  Sputum shows multiple organisms, but not MRSA or pneudomonas, so on unasyn  Follow CXR  Start following CRP/PCT for next few days  Should do well  PCT 0.14, so switch to augementin to finish course  4 more days antibx     Class 1 obesity due to excess calories without serious comorbidity with body mass index (BMI) of 30.0 to 30.9 in adult   Assessment & Plan    Chronic challenge for pt. His obesity also compromises his respiratory status          VTE:  Lovenox  Ulcer: Not Indicated  Lines: Central Line  Ongoing indication addressed  Line to be pulled prior to discharge    I have performed a physical exam and reviewed and updated ROS and Plan today (11/29/2018). In review of yesterday's note (11/28/2018), there are no changes except as documented above.     Discussed patient condition and risk of morbidity and/or mortality with Hospitalist, RN, RT, Therapies and Pharmacy  The pt looking fairly good for discharge with early followup  Level 2

## 2018-11-29 NOTE — DISCHARGE PLANNING
Received Choice form at 0904   Agency/Facility Name: [facility]  Referral sent per Choice form @ 0904     TANYA Sutherland notified via [employer]

## 2018-11-29 NOTE — DISCHARGE PLANNING
Medical Social Work    LSW received page from bedside RN requesting follow up with oxygen.     Per chart review, referral sent to [facility]. LSW called [facility], who states they have pt on file but no referral. On-call worked stated he will need the referral and then will call his supervisor to discuss. LSW faxed referral.

## 2018-11-29 NOTE — DISCHARGE PLANNING
Agency/Facility Name: [facility]  Spoke To: Maia  Outcome: Cancelled O2 Referral per LSW Gena request, as patient has been weaned off O2.

## 2018-11-29 NOTE — CARE PLAN
"Problem: Respiratory:  Goal: Respiratory status will improve  Outcome: PROGRESSING SLOWER THAN EXPECTED  Assessed pt's pulmonary status.  Breath sounds diminished in L lobe, and pt satting 91-94% on 2L nc while at rest.  Pt to be discharged with home o2.  Pt needs encouragement to use IS.  Initial attempts <1,000.  Asked pt to attempt again, and pt was able to get >2,000.  Discussed importance of using IS 10x hr.  Pt stated \"yeah, that's what they say.\"  Further education and reinforcement likely needed.  Pt's SO supportive of \"doing lung exercises\"    Problem: Safety  Goal: Free from accidental injury  Outcome: PROGRESSING AS EXPECTED  Pt assessed for fall risk, see Flowsheets.  Safety precautions in place.  Will continue to monitor      "

## 2018-12-04 ENCOUNTER — TELEMEDICINE2 (OUTPATIENT)
Dept: CARDIOLOGY | Facility: MEDICAL CENTER | Age: 68
End: 2018-12-04

## 2018-12-04 ENCOUNTER — TELEPHONE (OUTPATIENT)
Dept: CARDIOLOGY | Facility: MEDICAL CENTER | Age: 68
End: 2018-12-04

## 2018-12-04 ENCOUNTER — NON-PROVIDER VISIT (OUTPATIENT)
Dept: MEDICAL GROUP | Facility: CLINIC | Age: 68
End: 2018-12-04
Payer: MEDICARE

## 2018-12-04 ENCOUNTER — APPOINTMENT (OUTPATIENT)
Dept: RADIOLOGY | Facility: IMAGING CENTER | Age: 68
End: 2018-12-04
Attending: INTERNAL MEDICINE
Payer: MEDICARE

## 2018-12-04 VITALS
DIASTOLIC BLOOD PRESSURE: 86 MMHG | WEIGHT: 222 LBS | HEIGHT: 73 IN | OXYGEN SATURATION: 94 % | SYSTOLIC BLOOD PRESSURE: 131 MMHG | BODY MASS INDEX: 29.42 KG/M2 | HEART RATE: 62 BPM

## 2018-12-04 DIAGNOSIS — I21.4 NSTEMI (NON-ST ELEVATED MYOCARDIAL INFARCTION) (HCC): ICD-10-CM

## 2018-12-04 DIAGNOSIS — N18.30 CKD (CHRONIC KIDNEY DISEASE) STAGE 3, GFR 30-59 ML/MIN (HCC): ICD-10-CM

## 2018-12-04 DIAGNOSIS — Z79.899 HIGH RISK MEDICATION USE: ICD-10-CM

## 2018-12-04 DIAGNOSIS — I45.10 RBBB: ICD-10-CM

## 2018-12-04 DIAGNOSIS — I25.10 CORONARY ARTERY DISEASE INVOLVING NATIVE CORONARY ARTERY OF NATIVE HEART WITHOUT ANGINA PECTORIS: ICD-10-CM

## 2018-12-04 DIAGNOSIS — I10 ESSENTIAL HYPERTENSION: ICD-10-CM

## 2018-12-04 DIAGNOSIS — E11.9 DM TYPE 2 WITHOUT RETINOPATHY (HCC): ICD-10-CM

## 2018-12-04 DIAGNOSIS — Z95.1 HX OF CABG: ICD-10-CM

## 2018-12-04 PROCEDURE — 99215 OFFICE O/P EST HI 40 MIN: CPT | Performed by: INTERNAL MEDICINE

## 2018-12-04 PROCEDURE — 93000 ELECTROCARDIOGRAM COMPLETE: CPT | Performed by: FAMILY MEDICINE

## 2018-12-04 PROCEDURE — 71046 X-RAY EXAM CHEST 2 VIEWS: CPT | Mod: TC | Performed by: INTERNAL MEDICINE

## 2018-12-04 NOTE — PROGRESS NOTES
Subjective:   Chief Complaint:   Chief Complaint   Patient presents with   • HTN (Controlled)     Telemed       Romario Chaudhari is a 68 y.o. male who is here for Telemed from [place], for follow-up of coronary artery disease.  He was originally seen for preoperative risk assessment in March 2017.  He presented in November 2018 with a non-ST elevation MI.  He underwent left heart catheterization which revealed multivessel disease.  He underwent bypass surgery 11/21/2018 with Dr. Verdin including LIMA to the mid LAD, saphenous vein graft to the posterior descending artery, saphenous vein graft to the diagonal.  His EF was preserved.  He had postoperative atrial fibrillation.  He was discharged home on amiodarone, Plavix, Lasix, metoprolol, rosuvastatin, aspirin, lisinopril, verapamil.  He was not sent home on anticoagulation.    He also has hypertension, type 2 diabetes obesity.    He is having chest wall pain related to surgery, incision is healed.  No palpitations, dizziness, lightheadedness. He has some mild dyspnea.  Has chronic right ankle swelling. He is not sleeping well due to chest wall discomfort, has not been taking pain medication. No cva symptoms.    BP is relatively well controlled. Does not check at home.    DATA REVIEWED by me:  ECG 11/24/2018 atrial fibrillation, right bundle branch block, left posterior fascicular block, rate 125.    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    Left heart catheterization 11/20/2018  Multivessel disease, left main free of disease, LAD with proximal concentric 90%, diagonal with 99%, ramus intermedius which was small, circumflex nondominant, free of disease, RCA with mid 90%, EF 55%    Echo 11/20/2018  EF 60%, normal wall motion, no significant valve abnormality, no RVSP    CABG 11/21/2018, LIMA to LAD, vein graft to PDA, vein graft to the diagonal    Chest x-ray 11/28/2018 pulmonary edema with  infiltrates and cardiomegaly    Most recent labs:     11/28/2018 hemoglobin 13, platelets 510, sodium 134, potassium 4, creatinine 1.26 with a GFR of 56    Past Medical History:   Diagnosis Date   • Diabetes (HCC)    • Hypertension    • Upper GI bleed 2007     Past Surgical History:   Procedure Laterality Date   • MULTIPLE CORONARY ARTERY BYPASS ENDO VEIN HARVEST  11/21/2018    Procedure: MULTIPLE CORONARY ARTERY BYPASS x3, RIGHT LEG ENDOSCOPIC VEIN HARVEST;  Surgeon: Mohan Verdin M.D.;  Location: [facility];  Service: Cardiac   • GOOD  11/21/2018    Procedure: GOOD;  Surgeon: Mohan Verdin M.D.;  Location: [facility];  Service: Cardiac   • CARPAL TUNNEL RELEASE Bilateral    • CERVICAL LAMINECTOMY POSTERIOR     • LUMBAR LAMINECTOMY DISKECTOMY     • NERVE ULNAR TRANSFER Bilateral      Family History   Problem Relation Age of Onset   • Heart Attack Father 86   • Dementia Father    • Dementia Mother      Social History     Social History   • Marital status:      Spouse name: N/A   • Number of children: N/A   • Years of education: N/A     Occupational History   • Not on file.     Social History Main Topics   • Smoking status: Never Smoker   • Smokeless tobacco: Never Used   • Alcohol use Yes      Comment: occasional   • Drug use: No   • Sexual activity: Yes     Partners: Female     Other Topics Concern   • Not on file     Social History Narrative   • No narrative on file     No Known Allergies    Current Outpatient Prescriptions   Medication Sig Dispense Refill   • amoxicillin-clavulanate (AUGMENTIN) 875-125 MG Tab Take 1 Tab by mouth every 12 hours. 20 Tab 0   • artificial tears 1.4 % Solution Place 1 Drop in both eyes every 2 hours as needed (eye irritation). 1 Bottle 3   • metoprolol (LOPRESSOR) 25 MG Tab Take 1 Tab by mouth 2 Times a Day. 60 Tab 3   • potassium chloride SA (K-DUR) 10 MEQ Tab CR Take 1 Tab by mouth every day. 30 Tab 3   • aspirin 81 MG tablet Take 81 mg by  "mouth every day.     • amiodarone (PACERONE) 400 MG tablet Take 1 Tab by mouth every day. 30 Tab 3   • clopidogrel (PLAVIX) 75 MG Tab Take 1 Tab by mouth every day. 30 Tab 3   • oxyCODONE immediate release (ROXICODONE) 10 MG immediate release tablet Take 1 Tab by mouth every four hours as needed for up to 7 days. 42 Tab 0   • rosuvastatin (CRESTOR) 20 MG Tab Take 1 Tab by mouth every evening. 30 Tab 3   • furosemide (LASIX) 40 MG Tab Take 1 Tab by mouth every day. 30 Tab 3   • gabapentin (NEURONTIN) 100 MG Cap Take 100 mg by mouth 2 Times a Day.       No current facility-administered medications for this visit.        ROS  All others systems reviewed and negative.     Objective:     Blood pressure 131/86, pulse 62, height 1.854 m (6' 1\"), weight 100.7 kg (222 lb), SpO2 94 %. Body mass index is 29.29 kg/m².    Physical Exam   General: No acute distress. Well nourished.  HEENT: EOM grossly intact, no scleral icterus, no pharyngeal erythema.   Neck:  No JVD appreciated, no bruits, trachea midline  CVS: Poor quality audio, no murmur appreciated. Right ankle edema.  2+ radial pulses, 2+ DP pulses, well healing midline scar  Resp: Poor quality audio, Normal respiratory effort.  Abdomen: Soft, NT, no clint hepatomegaly.  MSK/Ext: No clubbing or cyanosis.  Skin: Warm and dry, no rashes.  Neurological: CN III-XII grossly intact. No focal deficits.   Psych: A&O x 3, good judgement, mildly despondent      Assessment:     1. Coronary artery disease involving native coronary artery of native heart without angina pectoris  COMP METABOLIC PANEL    DX-CHEST-2 VIEWS   2. Essential hypertension     3. CKD (chronic kidney disease) stage 3, GFR 30-59 ml/min ([facility])     4. RBBB     5. Hx of CABG  DX-CHEST-2 VIEWS   6. High risk medication use     7. DM type 2 without retinopathy (HCC)         Medical Decision Making:  Today's Assessment / Status / Plan:     -Needs blood work, CMP  -CXR  -Monitor for bradycardia arrhythmias  -Stop " amiodarone at 3 months, wear holter monitor   -Stop plavix after 1 year  -Call surgeon about chest wall pain  -If afib recurs he needs anticoagulation  -Encouragement to follow-up with primary care provider, he asked me [facility].  He states there are no primary care providers in [place].    Return in about 4 weeks (around 1/1/2019), or Any provider telemed.    It is my pleasure to participate in the care of Mr. Chaudhari.  Please do not hesitate to contact me with questions or concerns.    Santa Sands MD, [facility]  Cardiologist [facility]    Please note that this dictation was created using voice recognition software. I have made every reasonable attempt to correct obvious errors, but it is possible there are errors of grammar and possibly content that I did not discover before finalizing the note.    This consultation was conducted utilizing secure and cryptic video conferencing equipment with the assistance of a trained tele-presenter at the originating site.

## 2018-12-04 NOTE — TELEPHONE ENCOUNTER
Nikky Chandler R.N.             EL/Bharti       Patient was asked by Dr. Sands to call back with a list of the medications he is taking. He can be reached at [phone].      Pt provided meds as he is currently taking at home. Med list updated.   OUTPATIENT PROGRESS NOTE    Date of Service:  1/26/2018  Address: [address] [place] [address] INTERNAL MEDICINE  [address] [place] [address]  Dept: [phone]    Subjective:      Patient ID: [id number]  Alexy Brown is a 68 y.o. male with:  Chief Complaint   Patient presents with    Cough     Sx's started yesterday, pt just c/o a dry cough. Pt is prone to getting bronchitis. HPI: Starting yesterday he began with a dry cough. He continues to have bronchitis over the years. Laying down does exacerbate his cough. He slept in the recliner last night. He has been using 5-FU on his face and was given keflex for what seems to be facial cellulitis. He was also given a prednisone for his symptoms by dermatology. Review of Systems   Constitutional: Negative. HENT: Negative. Eyes: Negative. Respiratory: Positive for cough, shortness of breath and wheezing. Negative for hemoptysis and sputum production. Cardiovascular: Negative. Gastrointestinal: Negative. Genitourinary: Negative. Musculoskeletal: Negative. Skin: Negative. Neurological: Negative. Endo/Heme/Allergies: Negative. Psychiatric/Behavioral: Negative. Objective: Wt Readings from Last 3 Encounters:   01/26/18 241 lb (109.3 kg)   10/03/17 230 lb (104.3 kg)   07/03/17 236 lb 9.6 oz (107.3 kg)     BP Readings from Last 3 Encounters:   01/26/18 128/78   10/03/17 109/64   07/03/17 120/70      Vitals:    01/26/18 1124   BP: 128/78   Site: Right Arm   Position: Sitting   Cuff Size: Large Adult   Pulse: 76   Resp: 16   Temp: 97.6 °F (36.4 °C)   TempSrc: Oral   SpO2: 98%   Weight: 241 lb (109.3 kg)   Height: 5' 7\" (1.702 m)     Body mass index is 37.75 kg/m². Physical Exam   Constitutional: He is oriented to person, place, and time and well-developed, well-nourished, and in no distress.  Vital signs are normal. He appears not lethargic, to not be writhing in pain, not malnourished, not

## 2018-12-04 NOTE — NON-PROVIDER
Romario Chaudhari is a 68 y.o. male here for a non-provider visit for EKG    If abnormal was an in office provider notified today (if so, indicate provider)? Yes  Routed to PCP? Yes

## 2018-12-05 ENCOUNTER — NON-PROVIDER VISIT (OUTPATIENT)
Dept: MEDICAL GROUP | Facility: CLINIC | Age: 68
End: 2018-12-05
Payer: MEDICARE

## 2018-12-05 ENCOUNTER — TELEPHONE (OUTPATIENT)
Dept: CARDIOLOGY | Facility: MEDICAL CENTER | Age: 68
End: 2018-12-05

## 2018-12-05 DIAGNOSIS — I10 ESSENTIAL HYPERTENSION: ICD-10-CM

## 2018-12-05 DIAGNOSIS — I21.4 NSTEMI (NON-ST ELEVATED MYOCARDIAL INFARCTION) (HCC): ICD-10-CM

## 2018-12-05 PROCEDURE — 36415 COLL VENOUS BLD VENIPUNCTURE: CPT | Performed by: FAMILY MEDICINE

## 2018-12-05 RX ORDER — FUROSEMIDE 40 MG/1
40 TABLET ORAL DAILY
Qty: 30 TAB | Refills: 3 | COMMUNITY
Start: 2018-12-05 | End: 2018-12-13 | Stop reason: SDUPTHER

## 2018-12-06 ENCOUNTER — TELEPHONE (OUTPATIENT)
Dept: CARDIOLOGY | Facility: MEDICAL CENTER | Age: 68
End: 2018-12-06

## 2018-12-06 NOTE — TELEPHONE ENCOUNTER
"Notes recorded by Santa Sands M.D. on 12/5/2018 at 5:51 PM PST  Please let him know that his chest x-ray looked good for post surgery, no significant fluid located in her around the heart.  He does have some very minor lung tissue collapse which indicates he is not taking in good deep breaths and this can happen due to chest wall pain that he is experiencing.  I did encourage him to take the pain medication for his chest wall pain and contact his surgeon's office.  Please let him know, thank you.  LS  =====================================    Spoke to pt. Provided CXR results and recommendations. Pt reports feeling \"better everyday\" and has a follow up scheduled w/surgeon this month. Pt verbalized understanding.  "

## 2018-12-06 NOTE — TELEPHONE ENCOUNTER
Pt called back and stated he found a bottle of furosemide 40 mg under the seat of his car today and thinks he is supposed to have been taking it as it fell out of his hospital belongings. Per 11/29 discharge summary, pt is to be taking furosemide 40 mg daily, so he will begin to do so as per d/c summary. Educated re: furosemide and uses and side effects. Med list updated. Advised pt to weigh daily and record BP's. Encouraged to call with any further questions or concerns. Pt verbalized understanding.

## 2018-12-07 ENCOUNTER — TELEPHONE (OUTPATIENT)
Dept: CARDIOLOGY | Facility: MEDICAL CENTER | Age: 68
End: 2018-12-07

## 2018-12-07 DIAGNOSIS — J95.821 ACUTE RESPIRATORY FAILURE FOLLOWING TRAUMA AND SURGERY (HCC): ICD-10-CM

## 2018-12-07 DIAGNOSIS — I10 ESSENTIAL HYPERTENSION: ICD-10-CM

## 2018-12-07 DIAGNOSIS — I21.4 NSTEMI (NON-ST ELEVATED MYOCARDIAL INFARCTION) (HCC): ICD-10-CM

## 2018-12-07 NOTE — TELEPHONE ENCOUNTER
----- Message from Sirena Padilla L.P.N. sent at 12/7/2018  7:53 AM PST -----  Regarding: FW: Blood work      ----- Message -----  From: Santa Sands M.D.  Sent: 12/6/2018   6:11 PM  To: Bharti Chandler R.N.  Subject: Blood work                                       If he was not taking Lasix now he is going to start, he will need another basic metabolic panel in 2 weeks.  He had mild renal insufficiency on the blood work he just had done, chronic kidney disease early stage III.  His liver function looked okay except for his alkaline phosphatase was a little bit elevated and he should see his primary care provider about this.  Sodium and potassium looked okay.    Please order basic metabolic panel for 2 weeks.  Thank you!

## 2018-12-13 ENCOUNTER — TELEPHONE (OUTPATIENT)
Dept: CARDIOLOGY | Facility: MEDICAL CENTER | Age: 68
End: 2018-12-13

## 2018-12-13 DIAGNOSIS — I10 ESSENTIAL HYPERTENSION: ICD-10-CM

## 2018-12-13 DIAGNOSIS — I25.10 CORONARY ARTERY DISEASE INVOLVING NATIVE CORONARY ARTERY OF NATIVE HEART WITHOUT ANGINA PECTORIS: ICD-10-CM

## 2018-12-13 RX ORDER — AMIODARONE HYDROCHLORIDE 400 MG/1
400 TABLET ORAL DAILY
Qty: 30 TAB | Refills: 3 | Status: SHIPPED | OUTPATIENT
Start: 2018-12-13 | End: 2019-01-23 | Stop reason: SDUPTHER

## 2018-12-13 RX ORDER — CLOPIDOGREL BISULFATE 75 MG/1
75 TABLET ORAL DAILY
Qty: 30 TAB | Refills: 3 | Status: ON HOLD
Start: 2018-12-13 | End: 2020-07-13

## 2018-12-13 RX ORDER — FUROSEMIDE 40 MG/1
40 TABLET ORAL DAILY
Qty: 30 TAB | Refills: 3 | Status: SHIPPED | OUTPATIENT
Start: 2018-12-13 | End: 2018-12-17 | Stop reason: SDUPTHER

## 2018-12-13 RX ORDER — ROSUVASTATIN CALCIUM 20 MG/1
20 TABLET, COATED ORAL EVERY EVENING
Qty: 30 TAB | Refills: 3 | Status: SHIPPED | OUTPATIENT
Start: 2018-12-13 | End: 2019-01-24

## 2018-12-13 RX ORDER — POTASSIUM CHLORIDE 750 MG/1
10 TABLET, EXTENDED RELEASE ORAL DAILY
Qty: 30 TAB | Refills: 3 | Status: ON HOLD
Start: 2018-12-13 | End: 2020-07-13

## 2018-12-13 NOTE — TELEPHONE ENCOUNTER
"James Nelson, [facility]  Bharti Chandler R.N.             Hi Bharti,      pt would like to see if LS wants him to have any testing done in the near future. He asked as he states he did not hear anything about it after his telemed visit 12/4      Julissa Chandler R.N.             EL/Bharti     Patient had a Telemed appt on 12/4 with Dr Sands and wants to find out if he needs to have anymore appts. He wants a call back at [phone].       Spoke w/pt. Advised he has lab work ordered for next week (see previous notes) and he is to follow up as per Dr. Sands: \"Return in about 4 weeks (around 1/1/2019), or Any provider telemed\". Pt lives in [place] and prefers Telemed. Appt scheduling phone # provided to pt.    "

## 2018-12-17 ENCOUNTER — TELEPHONE (OUTPATIENT)
Dept: CARDIOLOGY | Facility: MEDICAL CENTER | Age: 68
End: 2018-12-17

## 2018-12-17 DIAGNOSIS — I10 ESSENTIAL HYPERTENSION: ICD-10-CM

## 2018-12-17 RX ORDER — FUROSEMIDE 40 MG/1
20 TABLET ORAL DAILY
Qty: 30 TAB | Refills: 3 | Status: ON HOLD | COMMUNITY
Start: 2018-12-17 | End: 2020-07-13

## 2018-12-17 NOTE — TELEPHONE ENCOUNTER
Please tell him great job for calling in.    Have him cut his metoprolol in half, 12.5 mg a.m. and p.m.    Have him hold his Lasix for 2 days then restart at half the dose daily and call us back with his weights just after [place].    If dizzy or lightheaded then tell him to stop the Lasix altogether.    Thank you!

## 2018-12-17 NOTE — TELEPHONE ENCOUNTER
Spoke to pt. Advised of recommendations as per Dr. Sands. Advised again to call call telemed scheduling for follow up as per previous conversation; # provided. Pt will call after [place] w/BP's and weights. Verbalized understanding w/read-back of instructions.

## 2018-12-17 NOTE — TELEPHONE ENCOUNTER
"Sommer Bryant, [facility]  Bharti Chandler R.N.   Phone Number: [phone]             LS     Hello there Romario Hay called and said that he had a triple bypass about 4 weeks ago. BP readings on the 16th was: 114/81, 111/80, 109/79. Today 104/81, 113/84, 107/82. He takes them one after another when he does his reading. Pt also said his water weight was for 3 days 117 and next day was 115, day after was 113, today 111. He would like a call back at: [phone].      Spoke to pt. Reports BP's as above; HR's average in the 60's. Pt states he has lost about 6 lbs over 4 days. He is currently taking Lasix 40 mg QD and is scheduled to have labs done later this week. He is worried that his BP's are too low and he has lost too much weight too quickly. Denies symptoms except he can feel his heart \"pounding\" when at rest.    To Dr. Sands.  "

## 2018-12-26 ENCOUNTER — NON-PROVIDER VISIT (OUTPATIENT)
Dept: MEDICAL GROUP | Facility: CLINIC | Age: 68
End: 2018-12-26
Payer: MEDICARE

## 2018-12-26 DIAGNOSIS — I21.4 NSTEMI (NON-ST ELEVATED MYOCARDIAL INFARCTION) (HCC): ICD-10-CM

## 2018-12-26 PROCEDURE — 36415 COLL VENOUS BLD VENIPUNCTURE: CPT | Performed by: INTERNAL MEDICINE

## 2018-12-26 NOTE — NON-PROVIDER
Romario Chaudhari is a 68 y.o. male here for a non-provider visit for Venipuncture    If abnormal was an in office provider notified upon receipt of results (if so, indicate provider)? Yes  Routed to PCP? Yes

## 2018-12-27 NOTE — TELEPHONE ENCOUNTER
He was concerned before about losing too much water weight.  Please let him know that his creatinine and potassium are still looking normal on his current dose of furosemide.  Thank you,  LS

## 2019-01-14 DIAGNOSIS — I48.91 ATRIAL FIBRILLATION, UNSPECIFIED TYPE (HCC): ICD-10-CM

## 2019-01-23 ENCOUNTER — TELEMEDICINE2 (OUTPATIENT)
Dept: CARDIOLOGY | Facility: MEDICAL CENTER | Age: 69
End: 2019-01-23
Payer: MEDICARE

## 2019-01-23 ENCOUNTER — NON-PROVIDER VISIT (OUTPATIENT)
Dept: MEDICAL GROUP | Facility: CLINIC | Age: 69
End: 2019-01-23
Payer: MEDICARE

## 2019-01-23 VITALS
WEIGHT: 225 LBS | DIASTOLIC BLOOD PRESSURE: 83 MMHG | TEMPERATURE: 98.6 F | OXYGEN SATURATION: 92 % | SYSTOLIC BLOOD PRESSURE: 137 MMHG | HEIGHT: 73 IN | BODY MASS INDEX: 29.82 KG/M2 | HEART RATE: 67 BPM

## 2019-01-23 DIAGNOSIS — R94.31 ABNORMAL EKG: ICD-10-CM

## 2019-01-23 DIAGNOSIS — I48.0 PAROXYSMAL ATRIAL FIBRILLATION (HCC): ICD-10-CM

## 2019-01-23 PROCEDURE — 99214 OFFICE O/P EST MOD 30 MIN: CPT | Performed by: INTERNAL MEDICINE

## 2019-01-23 PROCEDURE — 93000 ELECTROCARDIOGRAM COMPLETE: CPT | Performed by: FAMILY MEDICINE

## 2019-01-23 RX ORDER — AMIODARONE HYDROCHLORIDE 400 MG/1
200 TABLET ORAL DAILY
Qty: 30 TAB | Refills: 0 | Status: SHIPPED | OUTPATIENT
Start: 2019-01-23 | End: 2019-05-08

## 2019-01-23 NOTE — PROGRESS NOTES
Regarding:  Romario Chaudhari  YOB: 1950 [unfilled]  Date of Evaluation: 1/23/2019    TELEMEDICINE CONSULTATION    The patient is a 68 y.o. male who is here to discuss follow up his CAD, recent CABG.  Complaints occasional SOB, not related to activity, thinks could be related to metoprolol.  No chest pain, sternal incision is healed well.  Weight is down to baseline weight, actually 5 pounds less.    Review of Medical Records:  Limited patient records were provided by the primary care provider and have been reviewed in preparation for this consultation.      Social History:  Non-smoker    Past Medical History:    The patient  has a past medical history of Diabetes (HCC); Hypertension; and Upper GI bleed (2007).   Past Surgical History:    The patient  has a past surgical history that includes cervical laminectomy posterior; lumbar laminectomy diskectomy; carpal tunnel release (Bilateral); nerve ulnar transfer (Bilateral); multiple coronary artery bypass endo vein harvest (11/21/2018); and syd (11/21/2018).    Allergies:     The patient has No Known Allergies.    Family History:    The patient's family history includes Dementia in his father and mother; Heart Attack (age of onset: 86) in his father.                  ECG 11/24/2018 atrial fibrillation, right bundle branch block, left posterior fascicular block, rate 125.     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 Not in distress  Chest: No murmurs, normal heart sounds  Lungs: clear    Discussion/Recommendations:  1. CAD, three vessel CABG, 11/2018  2. Post op Afib, No recurrences  3. HTN  4. DM  5. High risk medication use    Decrease Amio to 200 daily, stop in 1 month.  Take lasix as needed instead of daily.  Stop metoprolol to see if his SOB improves, his LV function is normal.  3 months follow up.  He stopped statin on his own because it causes him memory loss.  Continue aspirin and plavix.    Electronically signed by Antelmo Chau M.D.  Interventional Cardiology      This telemedicine consultation was conducted from [facility] utilizing a videoconference interface.

## 2019-01-24 ENCOUNTER — OFFICE VISIT (OUTPATIENT)
Dept: MEDICAL GROUP | Facility: CLINIC | Age: 69
End: 2019-01-24
Payer: MEDICARE

## 2019-01-24 VITALS
TEMPERATURE: 98.4 F | OXYGEN SATURATION: 95 % | SYSTOLIC BLOOD PRESSURE: 125 MMHG | HEART RATE: 64 BPM | WEIGHT: 225 LBS | HEIGHT: 73 IN | RESPIRATION RATE: 16 BRPM | DIASTOLIC BLOOD PRESSURE: 84 MMHG | BODY MASS INDEX: 29.82 KG/M2

## 2019-01-24 DIAGNOSIS — G89.29 CHRONIC PAIN OF LEFT KNEE: ICD-10-CM

## 2019-01-24 DIAGNOSIS — I10 ESSENTIAL HYPERTENSION: ICD-10-CM

## 2019-01-24 DIAGNOSIS — M25.562 CHRONIC PAIN OF LEFT KNEE: ICD-10-CM

## 2019-01-24 DIAGNOSIS — G47.09 OTHER INSOMNIA: ICD-10-CM

## 2019-01-24 DIAGNOSIS — R74.8 ELEVATED ALKALINE PHOSPHATASE LEVEL: ICD-10-CM

## 2019-01-24 DIAGNOSIS — E11.9 TYPE 2 DIABETES MELLITUS WITHOUT COMPLICATION, WITHOUT LONG-TERM CURRENT USE OF INSULIN (HCC): ICD-10-CM

## 2019-01-24 PROBLEM — J20.9 ACUTE BRONCHITIS: Status: RESOLVED | Noted: 2018-11-25 | Resolved: 2019-01-24

## 2019-01-24 PROBLEM — J18.9 PNEUMONIA DUE TO INFECTIOUS ORGANISM: Status: RESOLVED | Noted: 2018-11-26 | Resolved: 2019-01-24

## 2019-01-24 PROBLEM — Z01.818 PREOPERATIVE CLEARANCE: Status: RESOLVED | Noted: 2017-03-10 | Resolved: 2019-01-24

## 2019-01-24 PROCEDURE — 99214 OFFICE O/P EST MOD 30 MIN: CPT | Performed by: PHYSICIAN ASSISTANT

## 2019-01-24 RX ORDER — TRAZODONE HYDROCHLORIDE 50 MG/1
50 TABLET ORAL NIGHTLY PRN
Qty: 30 TAB | Refills: 0 | Status: ON HOLD
Start: 2019-01-24 | End: 2020-07-11

## 2019-01-24 NOTE — PROGRESS NOTES
cc:  Difficulty sleeping    Subjective:     Romario Chaudhari is a 68 y.o. male presenting for difficulty sleeping      Patient states he has had difficulty sleeping since December.  He states that he feels like his body is on a graveyard schedule.  He states that he gets 3-4 hours a night.  When he wakes up, he cannot get back to sleep.  In November he did have heart surgery.  He has tried melatonin which does not help.  He states he used to read before bed, but this has not been helpful.  He does snore.  He states that he does not fall asleep during the day.  He does not get sleepy.  He denies becoming drowsy with driving.  He is following up with his cardiologist and saw him yesterday with telehealth.     He does admit to a history of diabetes and [facility]  He also had labs drawn by his cardiologist which do show some decreased kidney function and an elevated alkaline phosphatase level.    Patient is also reporting some left knee pain.  He reports a history of multiple knee surgeries.  He is not wanting to do anything at this time but will let us know if his symptoms worsen.    Review of systems:  See above.       Current Outpatient Prescriptions:   •  traZODone (DESYREL) 50 MG Tab, Take 1 Tab by mouth at bedtime as needed for Sleep., Disp: 30 Tab, Rfl: 0  •  amiodarone (PACERONE) 400 MG tablet, Take 0.5 Tabs by mouth every day., Disp: 30 Tab, Rfl: 0  •  metoprolol (LOPRESSOR) 25 MG Tab, Take 0.5 Tabs by mouth 2 Times a Day., Disp: 60 Tab, Rfl: 3  •  furosemide (LASIX) 40 MG Tab, Take 0.5 Tabs by mouth every day., Disp: 30 Tab, Rfl: 3  •  clopidogrel (PLAVIX) 75 MG Tab, Take 1 Tab by mouth every day., Disp: 30 Tab, Rfl: 3  •  potassium chloride SA (K-DUR) 10 MEQ Tab CR, Take 1 Tab by mouth every day., Disp: 30 Tab, Rfl: 3  •  artificial tears 1.4 % Solution, Place 1 Drop in both eyes every 2 hours as needed (eye irritation)., Disp: 1 Bottle, Rfl: 3  •  aspirin 81 MG tablet, Take 81 mg by  "mouth every day., Disp: , Rfl:   •  gabapentin (NEURONTIN) 100 MG Cap, Take 100 mg by mouth 2 Times a Day., Disp: , Rfl:     Allergies, past medical history, past surgical history, family history, social history reviewed and updated    Objective:     Vitals: /84 (BP Location: Right arm, Patient Position: Sitting)   Pulse 64   Temp 36.9 °C (98.4 °F)   Resp 16   Ht 1.854 m (6' 1\")   Wt 102.1 kg (225 lb)   SpO2 95%   BMI 29.69 kg/m²   General: Alert, pleasant, NAD  HEENT: Normocephalic.  EOMI, no icterus or pallor.  Conjunctivae and lids normal. External ears normal.   Neck supple.    Skin: Warm, dry, no rashes.  Musculoskeletal: Gait is normal.  Moves all extremities well.  Neuro: Cranial nerves II through XII intact.  No focal deficits noted.  Psych:  Affect/mood is normal, judgement is good, memory is intact, grooming is appropriate.    Assessment/Plan:     Romario was seen today for insomnia.    Diagnoses and all orders for this visit:    Other insomnia  -     traZODone (DESYREL) 50 MG Tab; Take 1 Tab by mouth at bedtime as needed for Sleep.  Advised patient sleep study is recommended.  Patient is adamant that he does not have sleep apnea and refuses this study.  We will try medication for no longer than a month.  Any refills he will need to be seen to be evaluated further and may eventually need sleep study.  He understands the risks of proceeding with medication without having study including death.    Elevated alkaline phosphatase level  -     COMP METABOLIC PANEL; Future  -     HEMOGLOBIN A1C; Future  Essential hypertension  -     COMP METABOLIC PANEL; Future  Type 2 diabetes mellitus without complication, without long-term current use of insulin (HCC)  -     COMP METABOLIC PANEL; Future  -     HEMOGLOBIN A1C; Future    Labs have been ordered to evaluate further.  We will follow-up in 2-3 months, sooner if needed.    Chronic pain of left knee  No treatment at this time per patient.  However he will " notify us if symptoms worsen.    This was a 25-30-minute appointment with greater than 50% spent in education and counseling.      Return in about 3 months (around 4/24/2019).

## 2019-02-22 ENCOUNTER — TELEPHONE (OUTPATIENT)
Dept: CARDIOLOGY | Facility: MEDICAL CENTER | Age: 69
End: 2019-02-22

## 2019-02-22 NOTE — TELEPHONE ENCOUNTER
"volunteer work approval   Received: Today   Message Contents   Rocio Chandler R.N.   Phone Number: [phone]             EL/raul     Please calling to discuss paperwork to be allowed to continue his volunteer work.  Please call pt at .      Spoke to pt. He volunteers for the county; non-strenuous activities, mostly \"driving people around\". He needs a letter stating it's okay for him to continue his volunteer work. Once letter is completed he would like it faxed to the [facility] in [place] (Fax: [phone]) for him to . He would like a call once completed.       "

## 2019-02-25 NOTE — TELEPHONE ENCOUNTER
Antelmo Chau M.D.   Bharti Chandler R.N. 3 days ago      Yes. Should be ok. (Routing comment)      Letter completed and faxed to [phone]. Pt notified that letter would be sent.

## 2019-05-08 ENCOUNTER — TELEMEDICINE2 (OUTPATIENT)
Dept: CARDIOLOGY | Facility: MEDICAL CENTER | Age: 69
End: 2019-05-08
Payer: MEDICARE

## 2019-05-08 ENCOUNTER — TELEMEDICINE ORIGINATING SITE VISIT (OUTPATIENT)
Dept: MEDICAL GROUP | Facility: CLINIC | Age: 69
End: 2019-05-08
Payer: MEDICARE

## 2019-05-08 VITALS
BODY MASS INDEX: 32.34 KG/M2 | HEART RATE: 65 BPM | WEIGHT: 244 LBS | TEMPERATURE: 97.3 F | OXYGEN SATURATION: 94 % | DIASTOLIC BLOOD PRESSURE: 90 MMHG | HEIGHT: 73 IN | RESPIRATION RATE: 16 BRPM | SYSTOLIC BLOOD PRESSURE: 164 MMHG

## 2019-05-08 DIAGNOSIS — I10 ESSENTIAL HYPERTENSION: ICD-10-CM

## 2019-05-08 PROCEDURE — 99214 OFFICE O/P EST MOD 30 MIN: CPT | Performed by: INTERNAL MEDICINE

## 2019-05-08 RX ORDER — AMLODIPINE BESYLATE 10 MG/1
10 TABLET ORAL DAILY
Qty: 30 TAB | Refills: 11 | Status: SHIPPED | OUTPATIENT
Start: 2019-05-08

## 2019-05-08 NOTE — PROGRESS NOTES
Cardiology Follow-up Consultation Note    Date of note:    5/8/2019    Primary Care Provider: Pcp Pt States None    Name:             Romario Chaudhari     YOB: 1950  MRN:               [id number]    CC: Follow up CAD, HTN    Patient ID/HPI:   68-year-old male patient with history of hypertension, diabetes mellitus, coronary artery disease with prior CABG here for regular follow-up.  He is feeling okay overall.  He has some good days and some bad days.  He has shortness of breath few days, some days he feels better.  Continues to have mild fatigue.  He does exercise 3 times on a bike for 20 minutes.  Denies palpitations.      ROS  Positive for fatigue, mild leg swelling, shortness of breath occasionally, arthritis.  All other systems reviewed and negative    Past Medical History:   Diagnosis Date   • Diabetes (HCC)    • Hypertension    • Upper GI bleed 2007         Past Surgical History:   Procedure Laterality Date   • MULTIPLE CORONARY ARTERY BYPASS ENDO VEIN HARVEST  11/21/2018    Procedure: MULTIPLE CORONARY ARTERY BYPASS x3, RIGHT LEG ENDOSCOPIC VEIN HARVEST;  Surgeon: Mohan Verdin M.D.;  Location: SURGERY [facility];  Service: Cardiac   • GOOD  11/21/2018    Procedure: GOOD;  Surgeon: Mohan Verdin M.D.;  Location: SURGERY [facility];  Service: Cardiac   • CARPAL TUNNEL RELEASE Bilateral    • CERVICAL LAMINECTOMY POSTERIOR     • LUMBAR LAMINECTOMY DISKECTOMY     • NERVE ULNAR TRANSFER Bilateral          Current Outpatient Prescriptions   Medication Sig Dispense Refill   • amLODIPine (NORVASC) 10 MG Tab Take 1 Tab by mouth every day. 30 Tab 11   • traZODone (DESYREL) 50 MG Tab Take 1 Tab by mouth at bedtime as needed for Sleep. 30 Tab 0   • furosemide (LASIX) 40 MG Tab Take 0.5 Tabs by mouth every day. 30 Tab 3   • clopidogrel (PLAVIX) 75 MG Tab Take 1 Tab by mouth every day. 30 Tab 3   • potassium chloride SA (K-DUR) 10 MEQ Tab CR Take 1 Tab by mouth every day. 30 Tab 3   •  "artificial tears 1.4 % Solution Place 1 Drop in both eyes every 2 hours as needed (eye irritation). 1 Bottle 3   • aspirin 81 MG tablet Take 81 mg by mouth every day.     • gabapentin (NEURONTIN) 100 MG Cap Take 100 mg by mouth 2 Times a Day.       No current facility-administered medications for this visit.          No Known Allergies      Family History   Problem Relation Age of Onset   • Heart Attack Father 86   • Dementia Father    • Dementia Mother          Social History     Social History   • Marital status:      Spouse name: N/A   • Number of children: N/A   • Years of education: N/A     Occupational History   • Not on file.     Social History Main Topics   • Smoking status: Never Smoker   • Smokeless tobacco: Never Used   • Alcohol use Yes      Comment: occasional   • Drug use: No   • Sexual activity: Yes     Partners: Female     Other Topics Concern   • Not on file     Social History Narrative   • No narrative on file         Physical Exam:  Ambulatory Vitals  BP (!) 164/90 (BP Location: Right arm, Patient Position: Sitting, BP Cuff Size: Adult)   Pulse 65   Temp 36.3 °C (97.3 °F) (Temporal)   Resp 16   Ht 1.854 m (6' 1\")   Wt 110.7 kg (244 lb)   SpO2 94%    Oxygen Therapy:  Pulse Oximetry: 94 %  BP Readings from Last 4 Encounters:   05/08/19 (!) 164/90   01/24/19 125/84   01/23/19 137/83   12/04/18 131/86       Weight/BMI: Body mass index is 32.19 kg/m².  Wt Readings from Last 4 Encounters:   05/08/19 110.7 kg (244 lb)   01/24/19 102.1 kg (225 lb)   01/23/19 102.1 kg (225 lb)   12/04/18 100.7 kg (222 lb)     Physical exam is performed with the help of nursing staff on site.    General: Well appearing and in no apparent distress  Head: atrumatic  Eyes: No conjunctival pallor   ENT: normal external appearance of nose and ears  Neck: JVD absent,   Lungs: respiratory sounds  normal, additional breath sounds absent  Heart: Regular rhythm,   murmurs absent, no rubs,   Lower extremity edema absent.  "   Abdomen:  non distended.  Extremities/MSK: no clubbing, no cyanosis  Neurological: normal orientation  Psychiatric: Appropriate affect, intact judgement and insight      Lab Data Review:  Lab Results   Component Value Date/Time    CHOLSTRLTOT 209 (H) 11/20/2018 02:01 AM     (H) 11/20/2018 02:01 AM    HDL 35 (A) 11/20/2018 02:01 AM    TRIGLYCERIDE 257 (H) 11/20/2018 02:01 AM       Lab Results   Component Value Date/Time    SODIUM 134 (L) 11/28/2018 05:40 AM    POTASSIUM 4.0 11/28/2018 05:40 AM    CHLORIDE 94 (L) 11/28/2018 05:40 AM    CO2 30 11/28/2018 05:40 AM    GLUCOSE 130 (H) 11/28/2018 05:40 AM    BUN 28 (H) 11/28/2018 05:40 AM    CREATININE 1.27 11/28/2018 05:40 AM     Lab Results   Component Value Date/Time    ALKPHOSPHAT 164 (H) 11/27/2018 06:04 AM    ASTSGOT 31 11/27/2018 06:04 AM    ALTSGPT 32 11/27/2018 06:04 AM    TBILIRUBIN 0.8 11/27/2018 06:04 AM      Lab Results   Component Value Date/Time    WBC 13.0 (H) 11/28/2018 05:40 AM        ECG 11/24/2018 atrial fibrillation, right bundle branch block, left posterior fascicular block, rate 125.     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 CAD, three vessel CABG, 11/2018  2. Post op Afib, No recurrences  3. HTN  4. DM  5. High risk medication use  6.  Statin intolerance, memory loss    He did not stop metoprolol.  Continues to feel tired.  Advised stopping  metoprolol.  His blood pressure is elevated, will start him on Norvasc, advised to call us back with blood pressure values in 2 weeks.  Stop amiodarone.  Brief postop A. fib, not on anticoagulation.  Continue aspirin and Plavix.  Does not want to take cholesterol medications.      Return in about 6 months (around 11/8/2019).      Antelmo WATSON  Interventional cardiologist  [facility]  [address] [address]  Phone: [phone]  Fax: [phone]

## 2019-05-23 ENCOUNTER — TELEPHONE (OUTPATIENT)
Dept: CARDIOLOGY | Facility: MEDICAL CENTER | Age: 69
End: 2019-05-23

## 2019-05-23 NOTE — TELEPHONE ENCOUNTER
Message   Received: Today   Message Contents   Antelmo Chau M.D.  Latisha Appiah R.N.   Caller: Unspecified (Today,  1:58 PM)             He is at low risk for EGD.   Should be ok to proceed.      Called Arlin and informed her of clearance. She then asked if pt could hold plavix for 5 days (he has already been instructed to hold plavix for 5 days by GI doctor but anesthesia is requesting a cardiology clearance prior to procedure). Received verbal okay from Dr. Chau to hold plavix for 5 days.     Clearance letter stating the above composed along with most recent progress note: to [facility] at [phone], attn: Arlin

## 2019-05-23 NOTE — TELEPHONE ENCOUNTER
clearance for EGD tomorrow   Received: Today   Message Contents   Rocio Appiah R.N.   Phone Number: [phone]             AK/rock Oliveros calling from [facility] in [place] for clearance for tomorrow's EGD.  Please call Arlin  [phone] or on [phone].        S/w Arlin and Pt will need anesthesia for EGD tomorrow and needs clearance.     Needs records as well.     Hx: echo 11/20/18, CAD w/ CABG 11/2018, post op a-fib, HTN, DM    To AK

## 2019-11-26 ENCOUNTER — OFFICE VISIT (OUTPATIENT)
Dept: MEDICAL GROUP | Facility: CLINIC | Age: 69
End: 2019-11-26
Payer: MEDICARE

## 2019-11-26 VITALS
RESPIRATION RATE: 18 BRPM | HEART RATE: 77 BPM | BODY MASS INDEX: 31.41 KG/M2 | TEMPERATURE: 98.5 F | SYSTOLIC BLOOD PRESSURE: 144 MMHG | DIASTOLIC BLOOD PRESSURE: 97 MMHG | HEIGHT: 73 IN | OXYGEN SATURATION: 92 % | WEIGHT: 237 LBS

## 2019-11-26 DIAGNOSIS — L97.501 ULCER OF FOOT, LIMITED TO BREAKDOWN OF SKIN, UNSPECIFIED LATERALITY (HCC): ICD-10-CM

## 2019-11-26 DIAGNOSIS — J40 BRONCHITIS: ICD-10-CM

## 2019-11-26 PROCEDURE — 99214 OFFICE O/P EST MOD 30 MIN: CPT | Performed by: PHYSICIAN ASSISTANT

## 2019-11-26 RX ORDER — METHYLPREDNISOLONE 4 MG/1
TABLET ORAL
Qty: 21 TAB | Refills: 0 | Status: SHIPPED
Start: 2019-11-26 | End: 2020-01-07

## 2019-11-26 RX ORDER — ALBUTEROL SULFATE 90 UG/1
2 AEROSOL, METERED RESPIRATORY (INHALATION) EVERY 4 HOURS PRN
Qty: 1 INHALER | Refills: 3 | Status: SHIPPED | OUTPATIENT
Start: 2019-11-26

## 2019-11-26 RX ORDER — AZITHROMYCIN 250 MG/1
TABLET, FILM COATED ORAL
Qty: 6 TAB | Refills: 0 | Status: SHIPPED | OUTPATIENT
Start: 2019-11-26 | End: 2020-01-07 | Stop reason: SDUPTHER

## 2019-11-26 RX ORDER — GABAPENTIN 100 MG/1
100 CAPSULE ORAL
COMMUNITY

## 2019-11-26 NOTE — PROGRESS NOTES
cc:  URI    Subjective:     Romario Chaudhari is a 69 y.o. male presenting for URI  symptoms    Patient presents to the office for URI symptoms.  [facility]  Patient has had green phlegm production for over a week.  He has been coughing.  He states that it is productive.  He does have a history of heart issues.  He has felt chilled.  He states that he has had a low grade temperature.  He denies sore throat and ear pain.  He denies any other symptoms at this time.  He denies hemoptysis.    Patient states that he is having cracking and granulation of his feet.  He states that his incision from achilles tendon repair is still closing from almost 2 year ago.      Review of systems:  See above. Denies any other symptoms unless previously indicated.        Current Outpatient Medications:   •  azithromycin (ZITHROMAX) 250 MG Tab, Take 2 tabs on day one and take 1 tab days 2-5., Disp: 6 Tab, Rfl: 0  •  methylPREDNISolone (MEDROL DOSEPAK) 4 MG Tablet Therapy Pack, Follow schedule on package instructions., Disp: 21 Tab, Rfl: 0  •  albuterol 108 (90 Base) MCG/ACT Aero Soln inhalation aerosol, Inhale 2 Puffs by mouth every four hours as needed for Shortness of Breath., Disp: 1 Inhaler, Rfl: 3  •  amLODIPine (NORVASC) 10 MG Tab, Take 1 Tab by mouth every day., Disp: 30 Tab, Rfl: 11  •  furosemide (LASIX) 40 MG Tab, Take 0.5 Tabs by mouth every day., Disp: 30 Tab, Rfl: 3  •  clopidogrel (PLAVIX) 75 MG Tab, Take 1 Tab by mouth every day., Disp: 30 Tab, Rfl: 3  •  potassium chloride SA (K-DUR) 10 MEQ Tab CR, Take 1 Tab by mouth every day., Disp: 30 Tab, Rfl: 3  •  artificial tears 1.4 % Solution, Place 1 Drop in both eyes every 2 hours as needed (eye irritation)., Disp: 1 Bottle, Rfl: 3  •  aspirin 81 MG tablet, Take 81 mg by mouth every day., Disp: , Rfl:   •  gabapentin (NEURONTIN) 100 MG Cap, Take 100 mg by mouth 2 Times a Day., Disp: , Rfl:   •  gabapentin (NEURONTIN) 100 MG Cap, Take 100 mg by  "mouth., Disp: , Rfl:   •  traZODone (DESYREL) 50 MG Tab, Take 1 Tab by mouth at bedtime as needed for Sleep. (Patient not taking: Reported on 11/26/2019), Disp: 30 Tab, Rfl: 0    Allergies, past medical history, past surgical history, family history, social history reviewed and updated    Objective:     Vitals: /97 (BP Location: Right arm, Patient Position: Sitting, BP Cuff Size: Adult)   Pulse 77   Temp 36.9 °C (98.5 °F) (Temporal)   Resp 18   Ht 1.854 m (6' 1\")   Wt 107.5 kg (237 lb)   SpO2 92%   BMI 31.27 kg/m²   General: Alert, pleasant, NAD  EYES:   PERRL, EOMI, no icterus or pallor.  Conjunctivae and lids normal.   HENT:  Normocephalic.  External ears normal. Hearing aids present.  No nasal drainage present.   Oropharynx non-erythematous, mucous membranes moist.  Neck supple.  No cervical or supraclavicular lymphadenopathy.  Heart: Regular rate and rhythm.  S1 and S2 normal.  No murmurs appreciated.  Respiratory: Normal respiratory effort.  Clear to auscultation bilaterally.  Abdomen: obese.  Skin: Warm, dry, no rashes.  Musculoskeletal: Gait is normal.  Moves all extremities well.  Extremities: No leg edema.  Pedal pulses 2+ symmetric. Multiple old healing wounds in feet bilaterally  Neurological: No tremors, sensation grossly intact, CN2-12 intact  Psych:  Affect/mood is normal, judgement is good, memory is intact, grooming is appropriate.    Assessment/Plan:     Romario was seen today for uri.    Diagnoses and all orders for this visit:    Bronchitis  -     azithromycin (ZITHROMAX) 250 MG Tab; Take 2 tabs on day one and take 1 tab days 2-5.  -     methylPREDNISolone (MEDROL DOSEPAK) 4 MG Tablet Therapy Pack; Follow schedule on package instructions.  -     albuterol 108 (90 Base) MCG/ACT Aero Soln inhalation aerosol; Inhale 2 Puffs by mouth every four hours as needed for Shortness of Breath.    Medications as indicated.  Rest, fluids, supportive treatment.  No improvement, patient to contact " clinic.    Ulcer of foot, limited to breakdown of skin, unspecified laterality (HCC)  -     REFERRAL TO WOUND CLINIC    Try to discuss routine care.  [facility] but unsure.  Patient then took a phone call at the end of the visit and begin to talk on his phone.  I would [facility] that [facility] with the [facility].    No follow-ups on file.    Please note that this dictation was created using voice recognition software. I have made every reasonable attempt to correct obvious errors, but expect that there are errors of grammar and possible content that I did not discover before finalizing note.

## 2020-01-07 ENCOUNTER — OFFICE VISIT (OUTPATIENT)
Dept: MEDICAL GROUP | Facility: CLINIC | Age: 70
End: 2020-01-07
Payer: MEDICARE

## 2020-01-07 VITALS
OXYGEN SATURATION: 94 % | TEMPERATURE: 97.5 F | RESPIRATION RATE: 16 BRPM | DIASTOLIC BLOOD PRESSURE: 100 MMHG | HEART RATE: 81 BPM | BODY MASS INDEX: 32.64 KG/M2 | WEIGHT: 241 LBS | SYSTOLIC BLOOD PRESSURE: 152 MMHG | HEIGHT: 72 IN

## 2020-01-07 DIAGNOSIS — R52 BODY ACHES: ICD-10-CM

## 2020-01-07 DIAGNOSIS — J40 BRONCHITIS: ICD-10-CM

## 2020-01-07 DIAGNOSIS — H57.89 RED EYE: ICD-10-CM

## 2020-01-07 DIAGNOSIS — G62.9 PERIPHERAL POLYNEUROPATHY: ICD-10-CM

## 2020-01-07 LAB
FLUAV+FLUBV AG SPEC QL IA: NEGATIVE
INT CON NEG: NEGATIVE
INT CON POS: POSITIVE

## 2020-01-07 PROCEDURE — 87804 INFLUENZA ASSAY W/OPTIC: CPT | Performed by: PHYSICIAN ASSISTANT

## 2020-01-07 PROCEDURE — 99214 OFFICE O/P EST MOD 30 MIN: CPT | Performed by: PHYSICIAN ASSISTANT

## 2020-01-07 RX ORDER — PREDNISONE 20 MG/1
TABLET ORAL
Qty: 5 TAB | Refills: 0 | Status: ON HOLD
Start: 2020-01-07 | End: 2020-07-13

## 2020-01-07 RX ORDER — AZITHROMYCIN 250 MG/1
TABLET, FILM COATED ORAL
Qty: 6 TAB | Refills: 0 | Status: SHIPPED | OUTPATIENT
Start: 2020-01-07 | End: 2020-01-30

## 2020-01-08 ENCOUNTER — TELEPHONE (OUTPATIENT)
Dept: MEDICAL GROUP | Facility: CLINIC | Age: 70
End: 2020-01-08

## 2020-01-08 NOTE — TELEPHONE ENCOUNTER
Forgot to mention to patient last night that steroid can really increase his sugars.  He needs to monitor his sugars very closely and if they are increasing to let us know.  Please notify patient.

## 2020-01-08 NOTE — PROGRESS NOTES
"cc:  fever    Subjective:     Romario Chaudhari is a 69 y.o. male presenting for fever      Patient presents to the office for fever.  This is a [facility].  Patient states that he is having left eye pain but is under care with Dr. Yusuf who is an eye specialist.  He states that he did have a flu shot.  He has not taken ibuprofen or tylenol.  Although his eye pain started 3 days ago he states that his skin has felt \"crawly\" with pain into his fingertips.  He denies a sore throat.  He states his temp at home is 99.3.  He denies nausea and vomiting. He states mainly he is having pain.  He has not tried anything.  He states that he ran out of potassium yesterday.      Review of systems:  See above.   Denies any symptoms unless previously indicated.        Current Outpatient Medications:   •  azithromycin (ZITHROMAX) 250 MG Tab, Take 2 tabs on day one and take 1 tab days 2-5., Disp: 6 Tab, Rfl: 0  •  predniSONE (DELTASONE) 20 MG Tab, Take one tab daily for five days., Disp: 5 Tab, Rfl: 0  •  gabapentin (NEURONTIN) 100 MG Cap, Take 100 mg by mouth., Disp: , Rfl:   •  albuterol 108 (90 Base) MCG/ACT Aero Soln inhalation aerosol, Inhale 2 Puffs by mouth every four hours as needed for Shortness of Breath., Disp: 1 Inhaler, Rfl: 3  •  amLODIPine (NORVASC) 10 MG Tab, Take 1 Tab by mouth every day., Disp: 30 Tab, Rfl: 11  •  traZODone (DESYREL) 50 MG Tab, Take 1 Tab by mouth at bedtime as needed for Sleep. (Patient not taking: Reported on 11/26/2019), Disp: 30 Tab, Rfl: 0  •  furosemide (LASIX) 40 MG Tab, Take 0.5 Tabs by mouth every day., Disp: 30 Tab, Rfl: 3  •  clopidogrel (PLAVIX) 75 MG Tab, Take 1 Tab by mouth every day., Disp: 30 Tab, Rfl: 3  •  potassium chloride SA (K-DUR) 10 MEQ Tab CR, Take 1 Tab by mouth every day., Disp: 30 Tab, Rfl: 3  •  artificial tears 1.4 % Solution, Place 1 Drop in both eyes every 2 hours as needed (eye irritation)., Disp: 1 Bottle, Rfl: 3  •  aspirin 81 MG tablet, Take 81 mg " by mouth every day., Disp: , Rfl:   •  gabapentin (NEURONTIN) 100 MG Cap, Take 100 mg by mouth 2 Times a Day., Disp: , Rfl:     Allergies, past medical history, past surgical history, family history, social history reviewed and updated    Objective:     Vitals: /100 (BP Location: Right arm)   Pulse 81   Temp 36.4 °C (97.5 °F) (Oral)   Resp 16   Ht 1.829 m (6')   Wt 109.3 kg (241 lb)   SpO2 94%   BMI 32.69 kg/m²   General: Alert, pleasant, NAD  EYES:   PERRL, EOMI, no icterus or pallor.  lids normal. Left eye conjuntiva is erythematous  HENT:  Normocephalic. Temporal pulses are palpable but not bounding bilaterally.   External ears normal. Tympanic membranes retracted.  Patient wears hearing aids.  No nasal drainage present.  Oropharynx non-erythematous, mucous membranes moist.  Neck supple.   No cervical or supraclavicular lymphadenopathy.  Heart: Regular rate and rhythm.  S1 and S2 normal.  No murmurs appreciated.  Respiratory: Normal respiratory effort.  Clear to auscultation bilaterally.  Abdomen: obese  Skin: Warm, dry, no rashes.  Musculoskeletal: Gait is normal.  Moves all extremities well.    Neurological: No tremors, sensation grossly intact, CN2-12 intact.  Psych:  Affect/mood is normal, judgement is good, memory is intact, grooming is appropriate.    Assessment/Plan:     Romario was seen today for fever and eye problem.    Diagnoses and all orders for this visit:    Body aches  -     POCT Influenza A/B  -     predniSONE (DELTASONE) 20 MG Tab; Take one tab daily for five days.  Red eye  -     predniSONE (DELTASONE) 20 MG Tab; Take one tab daily for five days.  Bronchitis  -     azithromycin (ZITHROMAX) 250 MG Tab; Take 2 tabs on day one and take 1 tab days 2-5.  -     predniSONE (DELTASONE) 20 MG Tab; Take one tab daily for five days.  Peripheral neuropathy    I have not discounted temporal arteritis or polymyalgia rheumatica.  However patient states that he has had these symptoms before.  He has  medications from his eye doctor that have helped symptoms and have helped at this time.  Rapid flu testing is negative.  We will start him on prednisone to see if this will help as well as a Z-Edilberto as he may have a bronchitis type infection.  However he understands the importance of getting to an ER if this does not improve.  He understands temporal arteritis can be an emergency.  Recommend follow-up in a week but he will follow-up if symptoms do not improve.      No follow-ups on file.    Please note that this dictation was created using voice recognition software. I have made every reasonable attempt to correct obvious errors, but expect that there are errors of grammar and possible content that I did not discover before finalizing note.

## 2020-01-30 ENCOUNTER — TELEMEDICINE2 (OUTPATIENT)
Dept: URGENT CARE | Facility: CLINIC | Age: 70
End: 2020-01-30
Payer: MEDICARE

## 2020-01-30 VITALS
WEIGHT: 235 LBS | BODY MASS INDEX: 31.87 KG/M2 | DIASTOLIC BLOOD PRESSURE: 79 MMHG | TEMPERATURE: 97.3 F | SYSTOLIC BLOOD PRESSURE: 118 MMHG | HEART RATE: 82 BPM | RESPIRATION RATE: 16 BRPM | OXYGEN SATURATION: 92 %

## 2020-01-30 DIAGNOSIS — R19.7 NAUSEA VOMITING AND DIARRHEA: ICD-10-CM

## 2020-01-30 DIAGNOSIS — A08.4 VIRAL GASTROENTERITIS: ICD-10-CM

## 2020-01-30 DIAGNOSIS — R11.2 NAUSEA VOMITING AND DIARRHEA: ICD-10-CM

## 2020-01-30 PROCEDURE — 99214 OFFICE O/P EST MOD 30 MIN: CPT | Performed by: NURSE PRACTITIONER

## 2020-01-30 RX ORDER — ONDANSETRON 4 MG/1
4 TABLET, FILM COATED ORAL EVERY 6 HOURS PRN
Qty: 10 TAB | Refills: 0 | Status: SHIPPED | OUTPATIENT
Start: 2020-01-30 | End: 2020-02-03

## 2020-01-30 ASSESSMENT — ENCOUNTER SYMPTOMS
ORTHOPNEA: 0
NAUSEA: 1
COUGH: 0
BLOOD IN STOOL: 0
HEMOPTYSIS: 0
DIAPHORESIS: 0
CHILLS: 0
MYALGIAS: 1
SPUTUM PRODUCTION: 0
ABDOMINAL PAIN: 1
PALPITATIONS: 0
CONSTIPATION: 0
WHEEZING: 0
FEVER: 0
VOMITING: 1
SHORTNESS OF BREATH: 0
DIARRHEA: 1

## 2020-01-31 NOTE — PROGRESS NOTES
Subjective:      Romario Chaudhari is a 69 y.o. male who presents with Nausea; Emesis; and Diarrhea            Patient presents at the [facility] for a telemedicine encounter over a secure, encrypted videoconferencing network.  RN on-site for encounter.  Romario comes in today with a new onset of nausea, vomiting, and diarrhea today.  He felt malaise and chilled last night.  He has little appetite for food or fluid today and has vomiting approximately 10 times.  He had an episode of diarrhea approximately 1 hour ago.  Denies any blood, mucus, or tarry stool.  Notes upper abdominal discomfort.  Denies any chest pain, shortness of breath, or dizziness.  Not taking any meds to treat the symptoms.  No recent travel or drinking from streams.  No ill contacts.  No suspected food poisoning.        Review of Systems   Constitutional: Positive for malaise/fatigue. Negative for chills, diaphoresis and fever.   Respiratory: Negative for cough, hemoptysis, sputum production, shortness of breath and wheezing.    Cardiovascular: Negative for chest pain, palpitations, orthopnea and leg swelling.   Gastrointestinal: Positive for abdominal pain, diarrhea, nausea and vomiting. Negative for blood in stool, constipation and melena.   Musculoskeletal: Positive for myalgias.     Medications, Allergies, and current problem list reviewed today in Epic     Objective:     Blood Pressure 118/79 (BP Location: Right arm, Patient Position: Sitting)   Pulse 82   Temperature 36.3 °C (97.3 °F) (Temporal)   Respiration 16   Weight 106.6 kg (235 lb)   Oxygen Saturation 92%   Body Mass Index 31.87 kg/m²      Physical Exam  Vitals signs reviewed.   Constitutional:       General: He is not in acute distress.     Appearance: Normal appearance. He is not toxic-appearing or diaphoretic.   Neck:      Musculoskeletal: Neck supple.   Cardiovascular:      Rate and Rhythm: Normal rate and regular rhythm.      Heart sounds: Normal heart sounds. No murmur. No  friction rub. No gallop.    Pulmonary:      Effort: Pulmonary effort is normal. No respiratory distress.      Breath sounds: Normal breath sounds. No stridor. No wheezing, rhonchi or rales.   Chest:      Chest wall: No tenderness.   Abdominal:      General: Bowel sounds are normal. There is no distension.      Palpations: Abdomen is soft.      Tenderness: There is tenderness in the right upper quadrant and epigastric area. There is no guarding or rebound. Negative signs include Dorman's sign and McBurney's sign.   Neurological:      Mental Status: He is alert and oriented to person, place, and time.                 Assessment/Plan:       1. Viral gastroenteritis    2. Nausea vomiting and diarrhea  - ondansetron (ZOFRAN) 4 MG Tab tablet; Take 1 Tab by mouth every 6 hours as needed for Nausea/Vomiting for up to 4 days.  Dispense: 10 Tab; Refill: 0    Advised Romario that based on the history and exam findings, this is likely a self-limiting viral GI illness.  There is no indication for antibiotics at this time.  Zofran as prescribed.  Advance diet as tolerated.  Maintain adequate po hydration.  RTC in 2-3 days if symptoms persist, sooner if worse.  ED precautions discussed.  Patient verbalized understanding of and agreed with plan of care.

## 2020-02-10 ENCOUNTER — OFFICE VISIT (OUTPATIENT)
Dept: MEDICAL GROUP | Facility: CLINIC | Age: 70
End: 2020-02-10
Payer: MEDICARE

## 2020-02-10 VITALS
DIASTOLIC BLOOD PRESSURE: 84 MMHG | TEMPERATURE: 98.1 F | OXYGEN SATURATION: 93 % | RESPIRATION RATE: 16 BRPM | WEIGHT: 229 LBS | HEIGHT: 73 IN | HEART RATE: 72 BPM | BODY MASS INDEX: 30.35 KG/M2 | SYSTOLIC BLOOD PRESSURE: 127 MMHG

## 2020-02-10 DIAGNOSIS — J40 BRONCHITIS: ICD-10-CM

## 2020-02-10 PROCEDURE — 99214 OFFICE O/P EST MOD 30 MIN: CPT | Performed by: PHYSICIAN ASSISTANT

## 2020-02-10 RX ORDER — AZITHROMYCIN 250 MG/1
TABLET, FILM COATED ORAL
Qty: 6 TAB | Refills: 1 | Status: ON HOLD
Start: 2020-02-10 | End: 2020-07-13

## 2020-02-10 NOTE — PROGRESS NOTES
cc:  URI symptoms    Subjective:     Romario Chaudhari is a 69 y.o. male presenting for URI symptoms      Patient presents to the office for URI symptoms.  Patient states that his daughter came to visit him ill.  He states that he is prone to bronchitis and that last time he was seen a z pack helped.  He states he is coughing up a brown green phlegm.   He states that [facility]  [facility] to [facility] when [facility] can but distance is an issue.  He has been coughing but denies fever, chills and nausea.  He has been ill for 6 days.  He denies any other symptoms or issues.     Review of systems:  See above.   Denies any symptoms unless previously indicated.        Current Outpatient Medications:   •  azithromycin (ZITHROMAX) 250 MG Tab, Take 2 tabs on day one and one tab days 2-5., Disp: 6 Tab, Rfl: 1  •  predniSONE (DELTASONE) 20 MG Tab, Take one tab daily for five days., Disp: 5 Tab, Rfl: 0  •  gabapentin (NEURONTIN) 100 MG Cap, Take 100 mg by mouth., Disp: , Rfl:   •  albuterol 108 (90 Base) MCG/ACT Aero Soln inhalation aerosol, Inhale 2 Puffs by mouth every four hours as needed for Shortness of Breath., Disp: 1 Inhaler, Rfl: 3  •  amLODIPine (NORVASC) 10 MG Tab, Take 1 Tab by mouth every day., Disp: 30 Tab, Rfl: 11  •  traZODone (DESYREL) 50 MG Tab, Take 1 Tab by mouth at bedtime as needed for Sleep. (Patient not taking: Reported on 11/26/2019), Disp: 30 Tab, Rfl: 0  •  furosemide (LASIX) 40 MG Tab, Take 0.5 Tabs by mouth every day., Disp: 30 Tab, Rfl: 3  •  clopidogrel (PLAVIX) 75 MG Tab, Take 1 Tab by mouth every day., Disp: 30 Tab, Rfl: 3  •  potassium chloride SA (K-DUR) 10 MEQ Tab CR, Take 1 Tab by mouth every day., Disp: 30 Tab, Rfl: 3  •  artificial tears 1.4 % Solution, Place 1 Drop in both eyes every 2 hours as needed (eye irritation)., Disp: 1 Bottle, Rfl: 3  •  aspirin 81 MG tablet, Take 81 mg by mouth every day., Disp: , Rfl:   •  gabapentin (NEURONTIN) 100 MG Cap,  "Take 100 mg by mouth 2 Times a Day., Disp: , Rfl:     Allergies, past medical history, past surgical history, family history, social history reviewed and updated    Objective:     Vitals: /84 (BP Location: Left arm, Patient Position: Sitting, BP Cuff Size: Large adult)   Pulse 72   Temp 36.7 °C (98.1 °F) (Temporal)   Resp 16   Ht 1.854 m (6' 1\")   Wt 103.9 kg (229 lb)   SpO2 93%   BMI 30.21 kg/m²   General: Alert, pleasant, NAD  EYES:   PERRL, EOMI, no icterus or pallor.  Conjunctivae and lids normal.   HENT:  Normocephalic.  External ears normal. Tympanic membranes pearly, opaque.  No nasal drainage present.  Oropharynx non-erythematous, mucous membranes moist.  Neck supple.    No cervical or supraclavicular lymphadenopathy.  Heart: Regular rate and rhythm.  S1 and S2 normal.  No murmurs appreciated.  Respiratory: Normal respiratory effort.  Clear to auscultation bilaterally.  Decreased breath sounds in the upper left lobe.  Abdomen:  obese.  Skin: Warm, dry, no rashes.  Musculoskeletal: Gait is normal.  Moves all extremities well.    Neurological: No tremors, sensation grossly intact, CN2-12 intact.  Psych:  Affect/mood is normal, judgement is good, memory is intact, grooming is appropriate.    Assessment/Plan:     Romario was seen today for follow-up.    Diagnoses and all orders for this visit:    Bronchitis  -     azithromycin (ZITHROMAX) 250 MG Tab; Take 2 tabs on day one and one tab days 2-5.    Z-Edilberto is indicated.  As patient is prone to bronchitis and as [facility] is leaving the [facility], 1 refill given.  Contact us sooner with any issues.        No follow-ups on file.    Please note that this dictation was created using voice recognition software. I have made every reasonable attempt to correct obvious errors, but expect that there are errors of grammar and possible content that I did not discover before finalizing note.   "

## 2020-06-24 NOTE — TELEPHONE ENCOUNTER
He was informed.   History of surgery  right ankle orif with screws, left breast cyst, ovary removed

## 2020-07-10 ENCOUNTER — HOSPITAL ENCOUNTER (OUTPATIENT)
Facility: MEDICAL CENTER | Age: 70
DRG: 378 | End: 2020-07-10
Admitting: HOSPITALIST
Payer: MEDICARE

## 2020-07-10 ENCOUNTER — HOSPITAL ENCOUNTER (INPATIENT)
Facility: MEDICAL CENTER | Age: 70
LOS: 2 days | DRG: 378 | End: 2020-07-13
Attending: HOSPITALIST | Admitting: HOSPITALIST
Payer: MEDICARE

## 2020-07-10 PROCEDURE — 99220 PR INITIAL OBSERVATION CARE,LEVL III: CPT | Performed by: HOSPITALIST

## 2020-07-10 PROCEDURE — G0378 HOSPITAL OBSERVATION PER HR: HCPCS

## 2020-07-10 RX ORDER — ALPRAZOLAM 0.25 MG/1
0.25 TABLET ORAL NIGHTLY PRN
COMMUNITY

## 2020-07-10 RX ORDER — PANTOPRAZOLE SODIUM 40 MG/1
20 TABLET, DELAYED RELEASE ORAL DAILY
Status: ON HOLD | COMMUNITY
End: 2020-07-13

## 2020-07-10 ASSESSMENT — COGNITIVE AND FUNCTIONAL STATUS - GENERAL
SUGGESTED CMS G CODE MODIFIER DAILY ACTIVITY: CH
MOBILITY SCORE: 24
DAILY ACTIVITIY SCORE: 24
SUGGESTED CMS G CODE MODIFIER MOBILITY: CH

## 2020-07-10 ASSESSMENT — LIFESTYLE VARIABLES
EVER HAD A DRINK FIRST THING IN THE MORNING TO STEADY YOUR NERVES TO GET RID OF A HANGOVER: NO
ON A TYPICAL DAY WHEN YOU DRINK ALCOHOL HOW MANY DRINKS DO YOU HAVE: 0
HOW MANY TIMES IN THE PAST YEAR HAVE YOU HAD 5 OR MORE DRINKS IN A DAY: 0
EVER_SMOKED: NEVER
ALCOHOL_USE: NO
DOES PATIENT WANT TO STOP DRINKING: NO
TOTAL SCORE: 0
AVERAGE NUMBER OF DAYS PER WEEK YOU HAVE A DRINK CONTAINING ALCOHOL: 0
TOTAL SCORE: 0
TOTAL SCORE: 0
HAVE PEOPLE ANNOYED YOU BY CRITICIZING YOUR DRINKING: NO
EVER FELT BAD OR GUILTY ABOUT YOUR DRINKING: NO
HAVE YOU EVER FELT YOU SHOULD CUT DOWN ON YOUR DRINKING: NO
CONSUMPTION TOTAL: NEGATIVE

## 2020-07-10 ASSESSMENT — FIBROSIS 4 INDEX
FIB4 SCORE: 0.75
FIB4 SCORE: 0.75

## 2020-07-11 PROBLEM — Z95.1 HX OF CABG: Status: ACTIVE | Noted: 2020-07-11

## 2020-07-11 PROBLEM — K92.1 GASTROINTESTINAL HEMORRHAGE WITH MELENA: Status: ACTIVE | Noted: 2020-07-11

## 2020-07-11 LAB
COVID ORDER STATUS COVID19: NORMAL
HGB BLD-MCNC: 11 G/DL (ref 14–18)
HGB BLD-MCNC: 11.1 G/DL (ref 14–18)
HGB BLD-MCNC: 11.7 G/DL (ref 14–18)
SARS-COV-2 RNA RESP QL NAA+PROBE: NOTDETECTED
SPECIMEN SOURCE: NORMAL

## 2020-07-11 PROCEDURE — 36415 COLL VENOUS BLD VENIPUNCTURE: CPT

## 2020-07-11 PROCEDURE — A9270 NON-COVERED ITEM OR SERVICE: HCPCS | Performed by: INTERNAL MEDICINE

## 2020-07-11 PROCEDURE — 96374 THER/PROPH/DIAG INJ IV PUSH: CPT

## 2020-07-11 PROCEDURE — A9270 NON-COVERED ITEM OR SERVICE: HCPCS | Performed by: HOSPITALIST

## 2020-07-11 PROCEDURE — 96376 TX/PRO/DX INJ SAME DRUG ADON: CPT

## 2020-07-11 PROCEDURE — 99233 SBSQ HOSP IP/OBS HIGH 50: CPT | Performed by: INTERNAL MEDICINE

## 2020-07-11 PROCEDURE — 85018 HEMOGLOBIN: CPT | Mod: 91

## 2020-07-11 PROCEDURE — 700102 HCHG RX REV CODE 250 W/ 637 OVERRIDE(OP): Performed by: HOSPITALIST

## 2020-07-11 PROCEDURE — 700102 HCHG RX REV CODE 250 W/ 637 OVERRIDE(OP): Performed by: INTERNAL MEDICINE

## 2020-07-11 PROCEDURE — U0004 COV-19 TEST NON-CDC HGH THRU: HCPCS

## 2020-07-11 PROCEDURE — 700105 HCHG RX REV CODE 258: Performed by: HOSPITALIST

## 2020-07-11 PROCEDURE — C9113 INJ PANTOPRAZOLE SODIUM, VIA: HCPCS | Performed by: HOSPITALIST

## 2020-07-11 PROCEDURE — 700105 HCHG RX REV CODE 258

## 2020-07-11 PROCEDURE — 700111 HCHG RX REV CODE 636 W/ 250 OVERRIDE (IP): Performed by: HOSPITALIST

## 2020-07-11 PROCEDURE — 770020 HCHG ROOM/CARE - TELE (206)

## 2020-07-11 PROCEDURE — C9803 HOPD COVID-19 SPEC COLLECT: HCPCS | Performed by: INTERNAL MEDICINE

## 2020-07-11 RX ORDER — POLYETHYLENE GLYCOL 3350 17 G/17G
1 POWDER, FOR SOLUTION ORAL
Status: DISCONTINUED | OUTPATIENT
Start: 2020-07-11 | End: 2020-07-13 | Stop reason: HOSPADM

## 2020-07-11 RX ORDER — LABETALOL HYDROCHLORIDE 5 MG/ML
10 INJECTION, SOLUTION INTRAVENOUS EVERY 4 HOURS PRN
Status: DISCONTINUED | OUTPATIENT
Start: 2020-07-11 | End: 2020-07-13 | Stop reason: HOSPADM

## 2020-07-11 RX ORDER — ALBUTEROL SULFATE 90 UG/1
2 AEROSOL, METERED RESPIRATORY (INHALATION) EVERY 4 HOURS PRN
Status: DISCONTINUED | OUTPATIENT
Start: 2020-07-10 | End: 2020-07-13 | Stop reason: HOSPADM

## 2020-07-11 RX ORDER — GABAPENTIN 100 MG/1
100 CAPSULE ORAL 3 TIMES DAILY
Status: DISCONTINUED | OUTPATIENT
Start: 2020-07-11 | End: 2020-07-13 | Stop reason: HOSPADM

## 2020-07-11 RX ORDER — PEG-3350, SODIUM SULFATE, SODIUM CHLORIDE, POTASSIUM CHLORIDE, SODIUM ASCORBATE AND ASCORBIC ACID 7.5-2.691G
100 KIT ORAL 2 TIMES DAILY
Status: COMPLETED | OUTPATIENT
Start: 2020-07-11 | End: 2020-07-12

## 2020-07-11 RX ORDER — POLYVINYL ALCOHOL 14 MG/ML
1 SOLUTION/ DROPS OPHTHALMIC
Status: DISCONTINUED | OUTPATIENT
Start: 2020-07-11 | End: 2020-07-13 | Stop reason: HOSPADM

## 2020-07-11 RX ORDER — HYDROMORPHONE HYDROCHLORIDE 1 MG/ML
0.25 INJECTION, SOLUTION INTRAMUSCULAR; INTRAVENOUS; SUBCUTANEOUS
Status: DISCONTINUED | OUTPATIENT
Start: 2020-07-11 | End: 2020-07-13 | Stop reason: HOSPADM

## 2020-07-11 RX ORDER — AMOXICILLIN 250 MG
2 CAPSULE ORAL 2 TIMES DAILY
Status: DISCONTINUED | OUTPATIENT
Start: 2020-07-11 | End: 2020-07-13 | Stop reason: HOSPADM

## 2020-07-11 RX ORDER — SODIUM CHLORIDE 9 MG/ML
INJECTION, SOLUTION INTRAVENOUS CONTINUOUS
Status: DISCONTINUED | OUTPATIENT
Start: 2020-07-11 | End: 2020-07-12

## 2020-07-11 RX ORDER — ONDANSETRON 4 MG/1
4 TABLET, ORALLY DISINTEGRATING ORAL EVERY 4 HOURS PRN
Status: DISCONTINUED | OUTPATIENT
Start: 2020-07-11 | End: 2020-07-13 | Stop reason: HOSPADM

## 2020-07-11 RX ORDER — PANTOPRAZOLE SODIUM 40 MG/10ML
40 INJECTION, POWDER, LYOPHILIZED, FOR SOLUTION INTRAVENOUS 2 TIMES DAILY
Status: DISCONTINUED | OUTPATIENT
Start: 2020-07-11 | End: 2020-07-13

## 2020-07-11 RX ORDER — ALPRAZOLAM 0.25 MG/1
0.25 TABLET ORAL NIGHTLY PRN
Status: DISCONTINUED | OUTPATIENT
Start: 2020-07-11 | End: 2020-07-13 | Stop reason: HOSPADM

## 2020-07-11 RX ORDER — OXYCODONE HYDROCHLORIDE 5 MG/1
2.5 TABLET ORAL
Status: DISCONTINUED | OUTPATIENT
Start: 2020-07-11 | End: 2020-07-13 | Stop reason: HOSPADM

## 2020-07-11 RX ORDER — ACETAMINOPHEN 325 MG/1
650 TABLET ORAL EVERY 6 HOURS PRN
Status: DISCONTINUED | OUTPATIENT
Start: 2020-07-11 | End: 2020-07-13 | Stop reason: HOSPADM

## 2020-07-11 RX ORDER — ONDANSETRON 2 MG/ML
4 INJECTION INTRAMUSCULAR; INTRAVENOUS EVERY 4 HOURS PRN
Status: DISCONTINUED | OUTPATIENT
Start: 2020-07-11 | End: 2020-07-13 | Stop reason: HOSPADM

## 2020-07-11 RX ORDER — AMLODIPINE BESYLATE 10 MG/1
10 TABLET ORAL DAILY
Status: DISCONTINUED | OUTPATIENT
Start: 2020-07-11 | End: 2020-07-13 | Stop reason: HOSPADM

## 2020-07-11 RX ORDER — OXYCODONE HYDROCHLORIDE 5 MG/1
5 TABLET ORAL
Status: DISCONTINUED | OUTPATIENT
Start: 2020-07-11 | End: 2020-07-13 | Stop reason: HOSPADM

## 2020-07-11 RX ORDER — SODIUM CHLORIDE 9 MG/ML
INJECTION, SOLUTION INTRAVENOUS
Status: COMPLETED
Start: 2020-07-11 | End: 2020-07-11

## 2020-07-11 RX ORDER — BISACODYL 10 MG
10 SUPPOSITORY, RECTAL RECTAL
Status: DISCONTINUED | OUTPATIENT
Start: 2020-07-11 | End: 2020-07-13 | Stop reason: HOSPADM

## 2020-07-11 RX ADMIN — SODIUM CHLORIDE: 9 INJECTION, SOLUTION INTRAVENOUS at 00:20

## 2020-07-11 RX ADMIN — GABAPENTIN 100 MG: 100 CAPSULE ORAL at 05:50

## 2020-07-11 RX ADMIN — SODIUM CHLORIDE: 9 INJECTION, SOLUTION INTRAVENOUS at 12:36

## 2020-07-11 RX ADMIN — PANTOPRAZOLE SODIUM 40 MG: 40 INJECTION, POWDER, LYOPHILIZED, FOR SOLUTION INTRAVENOUS at 17:54

## 2020-07-11 RX ADMIN — POLYETHYLENE GLYCOL 3350, SODIUM SULFATE, SODIUM CHLORIDE, POTASSIUM CHLORIDE, ASCORBIC ACID, SODIUM ASCORBATE 100 G: KIT at 17:54

## 2020-07-11 RX ADMIN — ALPRAZOLAM 0.25 MG: 0.25 TABLET ORAL at 00:47

## 2020-07-11 RX ADMIN — AMLODIPINE BESYLATE 10 MG: 10 TABLET ORAL at 05:51

## 2020-07-11 RX ADMIN — PANTOPRAZOLE SODIUM 40 MG: 40 INJECTION, POWDER, LYOPHILIZED, FOR SOLUTION INTRAVENOUS at 05:51

## 2020-07-11 RX ADMIN — ALPRAZOLAM 0.25 MG: 0.25 TABLET ORAL at 21:55

## 2020-07-11 RX ADMIN — GABAPENTIN 100 MG: 100 CAPSULE ORAL at 21:55

## 2020-07-11 ASSESSMENT — ENCOUNTER SYMPTOMS
NERVOUS/ANXIOUS: 0
DIZZINESS: 0
SPEECH CHANGE: 0
FLANK PAIN: 0
FEVER: 0
FOCAL WEAKNESS: 0
BACK PAIN: 0
WEAKNESS: 1
MEMORY LOSS: 0
CHILLS: 0
SHORTNESS OF BREATH: 0
DIAPHORESIS: 0
MYALGIAS: 0
SENSORY CHANGE: 0
VOMITING: 0
BLOOD IN STOOL: 1
NAUSEA: 0
ABDOMINAL PAIN: 0
PALPITATIONS: 0
DOUBLE VISION: 0
BLURRED VISION: 0
COUGH: 0
DEPRESSION: 0
HEADACHES: 0

## 2020-07-11 ASSESSMENT — FIBROSIS 4 INDEX
FIB4 SCORE: 0.75
FIB4 SCORE: 0.75

## 2020-07-11 NOTE — PROGRESS NOTES
Received report from NOC shift RN. Patient is A&Ox4, no complaints of pain, VSS, no signs of distress. Patient currently NPO, will talk to MD in rounds about diet order. All questions and concerns answered, bed in lowest and locked position, call light in reach, will continue to monitor.

## 2020-07-11 NOTE — ASSESSMENT & PLAN NOTE
He had multiple bouts of bright red and dark tarry stool starting 2 days prior  This is seemingly resolved at this point but he is on a Protonix was placed on a Protonix drip in [place] and I have continued twice daily IV Protonix  Dr. Ocasio with [facility] agreed to consult on the patient so I will keep patient n.p.o. in the case that he needs endoscopy    7/11   twice daily IV Protonix  Checking every 8 hour H&H's for 48 hours  Dr. Ocasio with [facility]   CLD per GI  Plan for scope per gI    7/12 h/h stable  Npo for egd today

## 2020-07-11 NOTE — PROGRESS NOTES
Monitor Summary  Sinus Rhythm 62-64  Rare PVC; 2nd * type II HR (x1 beat dropped)  .20/.12/.38

## 2020-07-11 NOTE — PROGRESS NOTES
69yo male with hx of HTN DM cad afib  On plavix  Presented with ugi bleed with melena and hematochezia symptoms started on Wednesday last episode of bleeding yesterday  Hemoglobin 11.9 vital signs are stable  Patient started on IV Protonix they spoke with Dr Ocasio who agrees to consult     Patient has prior history of GI bleed secondary to peptic ulcer and was treated at that time for H. pylori

## 2020-07-11 NOTE — PROGRESS NOTES
Received bedside report from transport  RN, pt care assumed, VSS, pt assessment complete. Pt AAOx4, 5/10 pain at this time. No signs of acute distress noted at this time. POC discussed with pt and verbalizes no questions. Pt denies any additional needs at this time. Bed in lowest position, pt educated on fall risk and verbalized understanding, call light within reach, hourly rounding initiated.

## 2020-07-11 NOTE — PROGRESS NOTES
Received ED to inpatient transfer request from [facility]   Sending Physician: Daron   Specialist consulted: Amarjit   Diagnosis GI bleed   Patient Accepted by: Dr. Andrez Warner     Patient coming via: TBD  ETA: TBD   Nursing to notify Direct Admit On-Call hospitalist when patient arrives

## 2020-07-11 NOTE — ASSESSMENT & PLAN NOTE
He has on aspirin and Plavix both of which I am holding in light of his GI bleed  He is followed by [facility] so we will need to discern when these medications should be restarted  CABG was most 2 years ago    7/11 to f/u with cardiology as outpatient  Consult prn

## 2020-07-11 NOTE — PROGRESS NOTES
2 RN skin check complete.   Devices in place: tele monitor.  Skin assessed under devices: clean dry intact with no sign of skin damage or breakdown.  Confirmed pressure ulcers found on: n/a.  New potential pressure ulcers noted on: n/a   Wound consult placed: n/a.  The following interventions in place: atmosair mattress     Bilateral ears clean dry intact with no sign of skin damage or breakdown  Bilateral elbows clean dry intact with no sign of skin damage or breakdown  Bilateral heels clean dry intact with no sign of skin damage or breakdown  Sacrum clean dry intact with no sign of skin damage or breakdown

## 2020-07-11 NOTE — H&P
Hospital Medicine History & Physical Note    Date of Service  7/10/2020    Primary Care Physician  Pcp Pt States None    Code Status  Full Code    Chief Complaint  Direct admit from [place] for GI bleed    History of Presenting Illness  70 y.o. male who presented 7/10/2020 as a direct admit from [place] for bloody/dark tarry stools.  This started Wednesday night and he had about 20 episodes, though he has not had any since about 5 PM Thursday.  He said he was treated for H pylori in the past, and at the end of June he was treated for Campylobacter with azithromycin.  He does have history of GI bleed in 2007 which required a transfusion.  With the Campylobacter episode he had significant abdominal pain and cramping, though this resolved, as did the bleeding at that time.    Review of Systems  Review of Systems   Constitutional: Negative for chills and fever.   Eyes: Negative for blurred vision.   Respiratory: Negative for cough and shortness of breath.    Cardiovascular: Negative for chest pain and palpitations.   Gastrointestinal: Positive for blood in stool (Though none now). Negative for abdominal pain, nausea and vomiting.   Genitourinary: Negative for dysuria.   Musculoskeletal: Negative for myalgias.   Neurological: Negative for dizziness and headaches.   All other systems reviewed and are negative.      Past Medical History   has a past medical history of Diabetes (HCC), Hypertension, and Upper GI bleed (2007).    Surgical History   has a past surgical history that includes cervical laminectomy posterior; lumbar laminectomy diskectomy; carpal tunnel release (Bilateral); nerve ulnar transfer (Bilateral); multiple coronary artery bypass endo vein harvest (11/21/2018); syd (11/21/2018); and achilles tendon repair.     Family History  family history includes Dementia in his father and mother; Heart Attack (age of onset: 86) in his father.     Social History   reports that he has never smoked. He has never used  smokeless tobacco. He reports current alcohol use. He reports that he does not use drugs.    Allergies  No Known Allergies    Medications  Prior to Admission Medications   Prescriptions Last Dose Informant Patient Reported? Taking?   ALPRAZolam (XANAX) 0.25 MG Tab 7/9/2020 at Unknown time  Yes Yes   Sig: Take 0.25 mg by mouth at bedtime as needed for Sleep.   albuterol 108 (90 Base) MCG/ACT Aero Soln inhalation aerosol   No No   Sig: Inhale 2 Puffs by mouth every four hours as needed for Shortness of Breath.   amLODIPine (NORVASC) 10 MG Tab 7/10/2020 at Unknown time  No No   Sig: Take 1 Tab by mouth every day.   artificial tears 1.4 % Solution   No No   Sig: Place 1 Drop in both eyes every 2 hours as needed (eye irritation).   aspirin 81 MG tablet 7/10/2020 at Unknown time  Yes No   Sig: Take 81 mg by mouth every day.   azithromycin (ZITHROMAX) 250 MG Tab   No No   Sig: Take 2 tabs on day one and one tab days 2-5.   clopidogrel (PLAVIX) 75 MG Tab 7/10/2020 at Unknown time  No No   Sig: Take 1 Tab by mouth every day.   furosemide (LASIX) 40 MG Tab 7/1/2020  Yes No   Sig: Take 0.5 Tabs by mouth every day.   gabapentin (NEURONTIN) 100 MG Cap 7/10/2020 at Unknown time  Yes No   Sig: Take 100 mg by mouth 2 Times a Day.   gabapentin (NEURONTIN) 100 MG Cap 7/10/2020 at Unknown time  Yes No   Sig: Take 100 mg by mouth.   pantoprazole (PROTONIX) 40 MG Tablet Delayed Response   Yes Yes   Sig: Take 20 mg by mouth every day.   potassium chloride SA (K-DUR) 10 MEQ Tab CR 7/1/2020  No No   Sig: Take 1 Tab by mouth every day.   predniSONE (DELTASONE) 20 MG Tab   No No   Sig: Take one tab daily for five days.   traZODone (DESYREL) 50 MG Tab   No No   Sig: Take 1 Tab by mouth at bedtime as needed for Sleep.   Patient not taking: Reported on 11/26/2019      Facility-Administered Medications: None       Physical Exam       Physical Exam  Vitals signs and nursing note reviewed.   Constitutional:       Appearance: He is well-developed.    HENT:      Head: Normocephalic and atraumatic.   Cardiovascular:      Rate and Rhythm: Normal rate and regular rhythm.      Heart sounds: Normal heart sounds. No murmur.   Pulmonary:      Effort: Pulmonary effort is normal. No respiratory distress.      Breath sounds: Normal breath sounds. No wheezing or rales.   Abdominal:      General: Bowel sounds are normal. There is no distension.      Palpations: Abdomen is soft.      Tenderness: There is no abdominal tenderness.   Musculoskeletal: Normal range of motion.   Skin:     General: Skin is warm and dry.      Findings: No erythema.         Laboratory:  Recent Labs     07/11/20  0012   HEMOGLOBIN 11.7*       Assessment/Plan:  Patient is admitted under observation status    * Gastrointestinal hemorrhage with melena- (present on admission)  Assessment & Plan  He had multiple bouts of bright red and dark tarry stool starting 2 days prior  This is seemingly resolved at this point but he is on a Protonix was placed on a Protonix drip in [place] and I have continued twice daily IV Protonix  [facility] Ocasio [facility] agreed to consult on the patient so I will keep patient n.p.o. in the case that he needs endoscopy  Checking every 8 hour H&H's for 48 hours    Hx of CABG- (present on admission)  Assessment & Plan  He has on aspirin and Plavix both of which I am holding in light of his GI bleed  He is followed by [facility] so we will need to discern when these medications should be restarted  CABG was most 2 years ago    Essential hypertension- (present on admission)  Assessment & Plan  I will continue his amlodipine and he has PRN labetalol if needed    DVT prophylaxis SCDs

## 2020-07-11 NOTE — PROGRESS NOTES
[facility] Medicine Daily Progress Note    Date of Service  7/11/2020    Chief Complaint  70 y.o. male admitted 7/10/2020 with melena, GI bleed and h/o CABG.    Hospital Course    70 y.o. male who presented 7/10/2020 as a direct admit from [place] for bloody/dark tarry stools.  This started Wednesday night and he had about 20 episodes, though he has not had any since about 5 PM Thursday.  He said he was treated for H pylori in the past, and at the end of June he was treated for Campylobacter with azithromycin.  He does have history of GI bleed in 2007 which required a transfusion.  With the Campylobacter episode he had significant abdominal pain and cramping, though this resolved, as did the bleeding at that time.      Interval Problem Update    Generalized weakness  Denies abd pain  BM today, blood streaked per pt      Consultants/Specialty  GI    Code Status  Full    Disposition  TBD    Review of Systems  Review of Systems   Constitutional: Negative for chills, diaphoresis, fever and malaise/fatigue.   HENT: Negative for congestion and hearing loss.    Eyes: Negative for blurred vision and double vision.   Respiratory: Negative for cough and shortness of breath.    Cardiovascular: Negative for chest pain, palpitations and leg swelling.   Gastrointestinal: Positive for blood in stool and melena. Negative for abdominal pain, nausea and vomiting.   Genitourinary: Negative for dysuria and flank pain.   Musculoskeletal: Negative for back pain, joint pain and myalgias.   Neurological: Positive for weakness. Negative for dizziness, sensory change, speech change, focal weakness and headaches.   Psychiatric/Behavioral: Negative for depression and memory loss. The patient is not nervous/anxious.         Physical Exam  Temp:  [36.6 °C (97.8 °F)-36.8 °C (98.3 °F)] 36.7 °C (98.1 °F)  Pulse:  [67-83] 83  Resp:  [16-18] 16  BP: (103-125)/(56-80) 123/72  SpO2:  [92 %-94 %] 94 %    Physical Exam  Vitals signs and nursing note  reviewed.   Constitutional:       General: He is not in acute distress.     Appearance: He is ill-appearing. He is not toxic-appearing or diaphoretic.   HENT:      Head: Normocephalic and atraumatic.      Nose: Nose normal.   Eyes:      General:         Right eye: No discharge.         Left eye: No discharge.      Pupils: Pupils are equal, round, and reactive to light.   Neck:      Musculoskeletal: Neck supple.      Thyroid: No thyromegaly.      Vascular: No JVD.   Cardiovascular:      Rate and Rhythm: Normal rate.      Heart sounds: No murmur.   Pulmonary:      Effort: No respiratory distress.      Breath sounds: Normal breath sounds. No stridor. No wheezing or rhonchi.   Abdominal:      General: Bowel sounds are normal. There is no distension.      Palpations: Abdomen is soft. There is no mass.      Tenderness: There is no abdominal tenderness. There is no guarding.   Musculoskeletal:         General: No swelling or tenderness.   Skin:     General: Skin is warm and dry.      Coloration: Skin is pale.      Findings: No erythema or rash.   Neurological:      Mental Status: He is alert and oriented to person, place, and time.      Cranial Nerves: No cranial nerve deficit.   Psychiatric:         Behavior: Behavior normal.         Thought Content: Thought content normal.         Fluids    Intake/Output Summary (Last 24 hours) at 7/11/2020 1334  Last data filed at 7/11/2020 1236  Gross per 24 hour   Intake 120 ml   Output 700 ml   Net -580 ml       Laboratory  Recent Labs     07/11/20  0012 07/11/20  0810   HEMOGLOBIN 11.7* 11.1*                       Imaging  No orders to display        Assessment/Plan  * Gastrointestinal hemorrhage with melena- (present on admission)  Assessment & Plan  He had multiple bouts of bright red and dark tarry stool starting 2 days prior  This is seemingly resolved at this point but he is on a Protonix was placed on a Protonix drip in [place] and I have continued twice daily IV Protonix  Dr.  Amarjit with [facility] agreed to consult on the patient so I will keep patient n.p.o. in the case that he needs endoscopy    7/11   twice daily IV Protonix  Checking every 8 hour H&H's for 48 hours  Dr. Ocasio with [facility]   CLD per GI  Plan for scope per gI    Hx of CABG- (present on admission)  Assessment & Plan  He has on aspirin and Plavix both of which I am holding in light of his GI bleed  He is followed by [facility] so we will need to discern when these medications should be restarted  CABG was most 2 years ago    7/11 to f/u with cardiology as outpatient  Consult prn    Essential hypertension- (present on admission)  Assessment & Plan  I will continue his amlodipine and he has PRN labetalol if needed       VTE prophylaxis: SCD
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
## 2020-07-12 ENCOUNTER — ANESTHESIA (OUTPATIENT)
Dept: SURGERY | Facility: MEDICAL CENTER | Age: 70
DRG: 378 | End: 2020-07-12
Payer: MEDICARE

## 2020-07-12 ENCOUNTER — ANESTHESIA EVENT (OUTPATIENT)
Dept: SURGERY | Facility: MEDICAL CENTER | Age: 70
DRG: 378 | End: 2020-07-12
Payer: MEDICARE

## 2020-07-12 LAB
ANION GAP SERPL CALC-SCNC: 14 MMOL/L (ref 7–16)
BUN SERPL-MCNC: 14 MG/DL (ref 8–22)
CALCIUM SERPL-MCNC: 8.2 MG/DL (ref 8.5–10.5)
CHLORIDE SERPL-SCNC: 103 MMOL/L (ref 96–112)
CO2 SERPL-SCNC: 19 MMOL/L (ref 20–33)
CREAT SERPL-MCNC: 0.91 MG/DL (ref 0.5–1.4)
EKG IMPRESSION: NORMAL
ERYTHROCYTE [DISTWIDTH] IN BLOOD BY AUTOMATED COUNT: 58.8 FL (ref 35.9–50)
GLUCOSE SERPL-MCNC: 92 MG/DL (ref 65–99)
HCT VFR BLD AUTO: 34.6 % (ref 42–52)
HGB BLD-MCNC: 10.6 G/DL (ref 14–18)
HGB BLD-MCNC: 11.1 G/DL (ref 14–18)
HGB BLD-MCNC: 11.7 G/DL (ref 14–18)
MCH RBC QN AUTO: 33.3 PG (ref 27–33)
MCHC RBC AUTO-ENTMCNC: 32.1 G/DL (ref 33.7–35.3)
MCV RBC AUTO: 103.9 FL (ref 81.4–97.8)
PLATELET # BLD AUTO: 439 K/UL (ref 164–446)
PMV BLD AUTO: 8.7 FL (ref 9–12.9)
POTASSIUM SERPL-SCNC: 3.7 MMOL/L (ref 3.6–5.5)
RBC # BLD AUTO: 3.33 M/UL (ref 4.7–6.1)
SODIUM SERPL-SCNC: 136 MMOL/L (ref 135–145)
WBC # BLD AUTO: 8.4 K/UL (ref 4.8–10.8)

## 2020-07-12 PROCEDURE — 700105 HCHG RX REV CODE 258: Performed by: HOSPITALIST

## 2020-07-12 PROCEDURE — 160203 HCHG ENDO MINUTES - 1ST 30 MINS LEVEL 4: Performed by: INTERNAL MEDICINE

## 2020-07-12 PROCEDURE — 0DB48ZX EXCISION OF ESOPHAGOGASTRIC JUNCTION, VIA NATURAL OR ARTIFICIAL OPENING ENDOSCOPIC, DIAGNOSTIC: ICD-10-PCS | Performed by: INTERNAL MEDICINE

## 2020-07-12 PROCEDURE — 160048 HCHG OR STATISTICAL LEVEL 1-5: Performed by: INTERNAL MEDICINE

## 2020-07-12 PROCEDURE — 96376 TX/PRO/DX INJ SAME DRUG ADON: CPT

## 2020-07-12 PROCEDURE — 88342 IMHCHEM/IMCYTCHM 1ST ANTB: CPT

## 2020-07-12 PROCEDURE — 500066 HCHG BITE BLOCK, ECT: Performed by: INTERNAL MEDICINE

## 2020-07-12 PROCEDURE — 99232 SBSQ HOSP IP/OBS MODERATE 35: CPT | Performed by: INTERNAL MEDICINE

## 2020-07-12 PROCEDURE — 700105 HCHG RX REV CODE 258: Performed by: ANESTHESIOLOGY

## 2020-07-12 PROCEDURE — 700102 HCHG RX REV CODE 250 W/ 637 OVERRIDE(OP): Performed by: INTERNAL MEDICINE

## 2020-07-12 PROCEDURE — C9113 INJ PANTOPRAZOLE SODIUM, VIA: HCPCS | Performed by: HOSPITALIST

## 2020-07-12 PROCEDURE — 88341 IMHCHEM/IMCYTCHM EA ADD ANTB: CPT

## 2020-07-12 PROCEDURE — 770020 HCHG ROOM/CARE - TELE (206)

## 2020-07-12 PROCEDURE — 93005 ELECTROCARDIOGRAM TRACING: CPT | Performed by: INTERNAL MEDICINE

## 2020-07-12 PROCEDURE — 0DBH8ZZ EXCISION OF CECUM, VIA NATURAL OR ARTIFICIAL OPENING ENDOSCOPIC: ICD-10-PCS | Performed by: INTERNAL MEDICINE

## 2020-07-12 PROCEDURE — 85018 HEMOGLOBIN: CPT

## 2020-07-12 PROCEDURE — 160009 HCHG ANES TIME/MIN: Performed by: INTERNAL MEDICINE

## 2020-07-12 PROCEDURE — 85027 COMPLETE CBC AUTOMATED: CPT

## 2020-07-12 PROCEDURE — 501629 HCHG TUBE, LUKI TRAP STERILE DISP: Performed by: INTERNAL MEDICINE

## 2020-07-12 PROCEDURE — 0DB78ZX EXCISION OF STOMACH, PYLORUS, VIA NATURAL OR ARTIFICIAL OPENING ENDOSCOPIC, DIAGNOSTIC: ICD-10-PCS | Performed by: INTERNAL MEDICINE

## 2020-07-12 PROCEDURE — 88312 SPECIAL STAINS GROUP 1: CPT

## 2020-07-12 PROCEDURE — 700102 HCHG RX REV CODE 250 W/ 637 OVERRIDE(OP): Performed by: HOSPITALIST

## 2020-07-12 PROCEDURE — 93010 ELECTROCARDIOGRAM REPORT: CPT | Performed by: INTERNAL MEDICINE

## 2020-07-12 PROCEDURE — 700101 HCHG RX REV CODE 250: Performed by: ANESTHESIOLOGY

## 2020-07-12 PROCEDURE — 160002 HCHG RECOVERY MINUTES (STAT): Performed by: INTERNAL MEDICINE

## 2020-07-12 PROCEDURE — A9270 NON-COVERED ITEM OR SERVICE: HCPCS | Performed by: INTERNAL MEDICINE

## 2020-07-12 PROCEDURE — 700111 HCHG RX REV CODE 636 W/ 250 OVERRIDE (IP): Performed by: ANESTHESIOLOGY

## 2020-07-12 PROCEDURE — 36415 COLL VENOUS BLD VENIPUNCTURE: CPT

## 2020-07-12 PROCEDURE — 160035 HCHG PACU - 1ST 60 MINS PHASE I: Performed by: INTERNAL MEDICINE

## 2020-07-12 PROCEDURE — 88305 TISSUE EXAM BY PATHOLOGIST: CPT | Mod: 59

## 2020-07-12 PROCEDURE — 700111 HCHG RX REV CODE 636 W/ 250 OVERRIDE (IP): Performed by: HOSPITALIST

## 2020-07-12 PROCEDURE — 80048 BASIC METABOLIC PNL TOTAL CA: CPT

## 2020-07-12 PROCEDURE — A9270 NON-COVERED ITEM OR SERVICE: HCPCS | Performed by: HOSPITALIST

## 2020-07-12 PROCEDURE — 160208 HCHG ENDO MINUTES - EA ADDL 1 MIN LEVEL 4: Performed by: INTERNAL MEDICINE

## 2020-07-12 RX ORDER — HALOPERIDOL 5 MG/ML
1 INJECTION INTRAMUSCULAR
Status: DISCONTINUED | OUTPATIENT
Start: 2020-07-12 | End: 2020-07-12 | Stop reason: HOSPADM

## 2020-07-12 RX ORDER — HYDROMORPHONE HYDROCHLORIDE 1 MG/ML
0.4 INJECTION, SOLUTION INTRAMUSCULAR; INTRAVENOUS; SUBCUTANEOUS
Status: DISCONTINUED | OUTPATIENT
Start: 2020-07-12 | End: 2020-07-12 | Stop reason: HOSPADM

## 2020-07-12 RX ORDER — LIDOCAINE HYDROCHLORIDE 20 MG/ML
INJECTION, SOLUTION EPIDURAL; INFILTRATION; INTRACAUDAL; PERINEURAL PRN
Status: DISCONTINUED | OUTPATIENT
Start: 2020-07-12 | End: 2020-07-12 | Stop reason: SURG

## 2020-07-12 RX ORDER — ONDANSETRON 2 MG/ML
4 INJECTION INTRAMUSCULAR; INTRAVENOUS
Status: DISCONTINUED | OUTPATIENT
Start: 2020-07-12 | End: 2020-07-12 | Stop reason: HOSPADM

## 2020-07-12 RX ORDER — SODIUM CHLORIDE, SODIUM LACTATE, POTASSIUM CHLORIDE, CALCIUM CHLORIDE 600; 310; 30; 20 MG/100ML; MG/100ML; MG/100ML; MG/100ML
INJECTION, SOLUTION INTRAVENOUS CONTINUOUS
Status: DISCONTINUED | OUTPATIENT
Start: 2020-07-12 | End: 2020-07-12 | Stop reason: HOSPADM

## 2020-07-12 RX ORDER — HYDROMORPHONE HYDROCHLORIDE 1 MG/ML
0.1 INJECTION, SOLUTION INTRAMUSCULAR; INTRAVENOUS; SUBCUTANEOUS
Status: DISCONTINUED | OUTPATIENT
Start: 2020-07-12 | End: 2020-07-12 | Stop reason: HOSPADM

## 2020-07-12 RX ORDER — LABETALOL HYDROCHLORIDE 5 MG/ML
5 INJECTION, SOLUTION INTRAVENOUS
Status: DISCONTINUED | OUTPATIENT
Start: 2020-07-12 | End: 2020-07-12 | Stop reason: HOSPADM

## 2020-07-12 RX ORDER — HYDROMORPHONE HYDROCHLORIDE 1 MG/ML
0.2 INJECTION, SOLUTION INTRAMUSCULAR; INTRAVENOUS; SUBCUTANEOUS
Status: DISCONTINUED | OUTPATIENT
Start: 2020-07-12 | End: 2020-07-12 | Stop reason: HOSPADM

## 2020-07-12 RX ORDER — SODIUM CHLORIDE, SODIUM LACTATE, POTASSIUM CHLORIDE, CALCIUM CHLORIDE 600; 310; 30; 20 MG/100ML; MG/100ML; MG/100ML; MG/100ML
INJECTION, SOLUTION INTRAVENOUS
Status: DISCONTINUED | OUTPATIENT
Start: 2020-07-12 | End: 2020-07-12 | Stop reason: SURG

## 2020-07-12 RX ORDER — HYDRALAZINE HYDROCHLORIDE 20 MG/ML
5 INJECTION INTRAMUSCULAR; INTRAVENOUS
Status: DISCONTINUED | OUTPATIENT
Start: 2020-07-12 | End: 2020-07-12 | Stop reason: HOSPADM

## 2020-07-12 RX ORDER — METOPROLOL TARTRATE 1 MG/ML
1 INJECTION, SOLUTION INTRAVENOUS
Status: DISCONTINUED | OUTPATIENT
Start: 2020-07-12 | End: 2020-07-12 | Stop reason: HOSPADM

## 2020-07-12 RX ADMIN — PANTOPRAZOLE SODIUM 40 MG: 40 INJECTION, POWDER, LYOPHILIZED, FOR SOLUTION INTRAVENOUS at 05:57

## 2020-07-12 RX ADMIN — ALPRAZOLAM 0.25 MG: 0.25 TABLET ORAL at 21:12

## 2020-07-12 RX ADMIN — PROPOFOL 25 MG: 10 INJECTION, EMULSION INTRAVENOUS at 11:07

## 2020-07-12 RX ADMIN — PROPOFOL 25 MG: 10 INJECTION, EMULSION INTRAVENOUS at 11:04

## 2020-07-12 RX ADMIN — PANTOPRAZOLE SODIUM 40 MG: 40 INJECTION, POWDER, LYOPHILIZED, FOR SOLUTION INTRAVENOUS at 16:59

## 2020-07-12 RX ADMIN — PROPOFOL 25 MG: 10 INJECTION, EMULSION INTRAVENOUS at 11:00

## 2020-07-12 RX ADMIN — GABAPENTIN 100 MG: 100 CAPSULE ORAL at 21:12

## 2020-07-12 RX ADMIN — GABAPENTIN 100 MG: 100 CAPSULE ORAL at 05:57

## 2020-07-12 RX ADMIN — POLYETHYLENE GLYCOL 3350, SODIUM SULFATE, SODIUM CHLORIDE, POTASSIUM CHLORIDE, ASCORBIC ACID, SODIUM ASCORBATE 100 G: KIT at 05:57

## 2020-07-12 RX ADMIN — SODIUM CHLORIDE, POTASSIUM CHLORIDE, SODIUM LACTATE AND CALCIUM CHLORIDE: 600; 310; 30; 20 INJECTION, SOLUTION INTRAVENOUS at 10:49

## 2020-07-12 RX ADMIN — EPHEDRINE SULFATE 10 MG: 50 INJECTION, SOLUTION INTRAVENOUS at 11:07

## 2020-07-12 RX ADMIN — AMLODIPINE BESYLATE 10 MG: 10 TABLET ORAL at 05:57

## 2020-07-12 RX ADMIN — FENTANYL CITRATE 100 MCG: 50 INJECTION INTRAMUSCULAR; INTRAVENOUS at 10:56

## 2020-07-12 RX ADMIN — PROPOFOL 25 MG: 10 INJECTION, EMULSION INTRAVENOUS at 11:16

## 2020-07-12 RX ADMIN — PROPOFOL 50 MG: 10 INJECTION, EMULSION INTRAVENOUS at 10:56

## 2020-07-12 RX ADMIN — PROPOFOL 25 MG: 10 INJECTION, EMULSION INTRAVENOUS at 11:08

## 2020-07-12 RX ADMIN — LIDOCAINE HYDROCHLORIDE 100 MG: 20 INJECTION, SOLUTION EPIDURAL; INFILTRATION; INTRACAUDAL at 10:56

## 2020-07-12 RX ADMIN — PROPOFOL 25 MG: 10 INJECTION, EMULSION INTRAVENOUS at 11:02

## 2020-07-12 RX ADMIN — EPHEDRINE SULFATE 10 MG: 50 INJECTION, SOLUTION INTRAVENOUS at 11:17

## 2020-07-12 RX ADMIN — PROPOFOL 25 MG: 10 INJECTION, EMULSION INTRAVENOUS at 11:13

## 2020-07-12 RX ADMIN — SODIUM CHLORIDE: 9 INJECTION, SOLUTION INTRAVENOUS at 03:19

## 2020-07-12 RX ADMIN — PROPOFOL 25 MG: 10 INJECTION, EMULSION INTRAVENOUS at 11:10

## 2020-07-12 ASSESSMENT — COGNITIVE AND FUNCTIONAL STATUS - GENERAL
MOBILITY SCORE: 24
SUGGESTED CMS G CODE MODIFIER MOBILITY: CH
DAILY ACTIVITIY SCORE: 24
SUGGESTED CMS G CODE MODIFIER DAILY ACTIVITY: CH

## 2020-07-12 ASSESSMENT — ENCOUNTER SYMPTOMS
FOCAL WEAKNESS: 0
SHORTNESS OF BREATH: 0
DEPRESSION: 0
NAUSEA: 0
FLANK PAIN: 0
ABDOMINAL PAIN: 0
SPEECH CHANGE: 0
COUGH: 0
CARDIOVASCULAR NEGATIVE: 1
HEADACHES: 0
SENSORY CHANGE: 0
BLOOD IN STOOL: 1
MEMORY LOSS: 0
PALPITATIONS: 0
RESPIRATORY NEGATIVE: 1
WEAKNESS: 1
CHILLS: 0
MYALGIAS: 0
CONSTITUTIONAL NEGATIVE: 1
BACK PAIN: 0
DOUBLE VISION: 0
GASTROINTESTINAL NEGATIVE: 1
FEVER: 0
NERVOUS/ANXIOUS: 0

## 2020-07-12 ASSESSMENT — PAIN SCALES - GENERAL: PAIN_LEVEL: 0

## 2020-07-12 NOTE — ANESTHESIA TIME REPORT
Anesthesia Start and Stop Event Times     Date Time Event    7/12/2020 1035 Ready for Procedure     1049 Anesthesia Start     1128 Anesthesia Stop        Responsible Staff  07/12/20    Name Role Begin End    Gilles Ferro D.O. Anesth 1049 1128        Preop Diagnosis (Free Text):  Pre-op Diagnosis     gi bleed        Preop Diagnosis (Codes):    Post op Diagnosis  GI bleed      Premium Reason  E. Weekend    Comments:

## 2020-07-12 NOTE — OR NURSING
"Pt A&OX4. VSS. Pt on 2 L of oxygen while sleeping in PACU, via nasal cannula. Pt states having a \"sore stomach,\" declined pain medication. No complaints of nausea. Per MD, okay to advance diet as tolerated. Report called to SANKET Quintanilla. Pt out of PACU via bed, transported on monitor. Transported by this RN and SANKET Ríos.   "
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
## 2020-07-12 NOTE — PROGRESS NOTES
Patient back to unit from colonoscopy. Patient is A&Ox4, no complaints of pain, VSS, no signs of distress. Monitor room notified. All questions and concerns answered, bed in lowest and locked position, call light in pace, will continue to monitor.

## 2020-07-12 NOTE — ANESTHESIA PREPROCEDURE EVALUATION
Relevant Problems   CARDIAC   (+) Essential hypertension   (+) Hx of CABG   (+) NSTEMI (non-ST elevated myocardial infarction) (HCC)      ENDO   (+) Type 2 diabetes mellitus without complication, without long-term current use of insulin (HCC)      Other   (+) Gastrointestinal hemorrhage with melena       Physical Exam    Airway   Mallampati: II  TM distance: >3 FB  Neck ROM: full       Cardiovascular - normal exam  Rhythm: regular  Rate: normal  (-) murmur     Dental - normal exam           Pulmonary - normal exam  Breath sounds clear to auscultation     Abdominal    Neurological - normal exam                 Anesthesia Plan    ASA 3- EMERGENT   ASA physical status 3 criteria: CAD/stents (> 3 months)ASA physical status emergent criteria: acute hemorrhage    Plan - MAC             Induction: intravenous      Pertinent diagnostic labs and testing reviewed    Informed Consent:    Anesthetic plan and risks discussed with patient.    Use of blood products discussed with: patient whom consented to blood products.

## 2020-07-12 NOTE — PROGRESS NOTES
Received report from NOC shift RN. Patient is A&Ox4, no complaints of pain, updated on procedure scheduled for 0930. All questions and concerns answered, bed in lowest and locked position, call light in reach, will continue to monitor.

## 2020-07-12 NOTE — PROGRESS NOTES
[facility] Medicine Daily Progress Note    Date of Service  7/12/2020    Chief Complaint  70 y.o. male admitted 7/10/2020 with melena, GI bleed and h/o CABG.    Hospital Course    70 y.o. male who presented 7/10/2020 as a direct admit from [place] for bloody/dark tarry stools.  This started Wednesday night and he had about 20 episodes, though he has not had any since about 5 PM Thursday.  He said he was treated for H pylori in the past, and at the end of June he was treated for Campylobacter with azithromycin.  He does have history of GI bleed in 2007 which required a transfusion.  With the Campylobacter episode he had significant abdominal pain and cramping, though this resolved, as did the bleeding at that time.      Interval Problem Update    + BM with bowel prep, liquid output  H/h stable  + weakness    F/u scope report      Consultants/Specialty  GI    Code Status  Full    Disposition  TBD    Review of Systems  Review of Systems   Constitutional: Negative for chills, fever and malaise/fatigue.   Eyes: Negative for double vision.   Respiratory: Negative for cough and shortness of breath.    Cardiovascular: Negative for chest pain, palpitations and leg swelling.   Gastrointestinal: Positive for blood in stool and melena. Negative for abdominal pain and nausea.   Genitourinary: Negative for dysuria and flank pain.   Musculoskeletal: Negative for back pain and myalgias.   Neurological: Positive for weakness. Negative for sensory change, speech change, focal weakness and headaches.   Psychiatric/Behavioral: Negative for depression and memory loss. The patient is not nervous/anxious.         Physical Exam  Temp:  [36.3 °C (97.3 °F)-36.7 °C (98.1 °F)] 36.3 °C (97.3 °F)  Pulse:  [59-83] 70  Resp:  [16-18] 18  BP: (102-130)/(58-74) 118/69  SpO2:  [94 %-97 %] 96 %    Physical Exam  Vitals signs and nursing note reviewed.   Constitutional:       General: He is not in acute distress.     Appearance: He is ill-appearing.    HENT:      Head: Normocephalic and atraumatic.      Nose: Nose normal.   Eyes:      General:         Right eye: No discharge.         Left eye: No discharge.      Pupils: Pupils are equal, round, and reactive to light.   Neck:      Musculoskeletal: Neck supple.      Thyroid: No thyromegaly.      Vascular: No JVD.   Cardiovascular:      Rate and Rhythm: Normal rate.      Pulses: Normal pulses.   Pulmonary:      Effort: No respiratory distress.      Breath sounds: Normal breath sounds. No stridor. No wheezing or rhonchi.   Abdominal:      General: Bowel sounds are normal. There is no distension.      Palpations: Abdomen is soft.      Tenderness: There is no abdominal tenderness. There is no guarding.   Musculoskeletal:         General: No swelling or tenderness.   Skin:     General: Skin is warm and dry.      Coloration: Skin is pale.   Neurological:      Mental Status: He is alert and oriented to person, place, and time.      Cranial Nerves: No cranial nerve deficit.      Sensory: No sensory deficit.   Psychiatric:         Behavior: Behavior normal.         Thought Content: Thought content normal.         Fluids    Intake/Output Summary (Last 24 hours) at 7/12/2020 1131  Last data filed at 7/12/2020 1122  Gross per 24 hour   Intake 1560 ml   Output 1700 ml   Net -140 ml       Laboratory  Recent Labs     07/11/20  1551 07/12/20  0006 07/12/20  0826   WBC  --  8.4  --    RBC  --  3.33*  --    HEMOGLOBIN 11.0* 11.1* 11.7*   HEMATOCRIT  --  34.6*  --    MCV  --  103.9*  --    MCH  --  33.3*  --    MCHC  --  32.1*  --    RDW  --  58.8*  --    PLATELETCT  --  439  --    MPV  --  8.7*  --      Recent Labs     07/12/20  0006   SODIUM 136   POTASSIUM 3.7   CHLORIDE 103   CO2 19*   GLUCOSE 92   BUN 14   CREATININE 0.91   CALCIUM 8.2*                   Imaging  No orders to display        Assessment/Plan  * Gastrointestinal hemorrhage with melena- (present on admission)  Assessment & Plan  He had multiple bouts of bright red  and dark tarry stool starting 2 days prior  This is seemingly resolved at this point but he is on a Protonix was placed on a Protonix drip in [place] and I have continued twice daily IV Protonix  Dr. Ocasio with [facility] agreed to consult on the patient so I will keep patient n.p.o. in the case that he needs endoscopy    7/11   twice daily IV Protonix  Checking every 8 hour H&H's for 48 hours  Dr. Ocasio with [facility]   CLD per GI  Plan for scope per gI    7/12 h/h stable  Npo for egd today    Hx of CABG- (present on admission)  Assessment & Plan  He has on aspirin and Plavix both of which I am holding in light of his GI bleed  He is followed by [facility] so we will need to discern when these medications should be restarted  CABG was most 2 years ago    7/11 to f/u with cardiology as outpatient  Consult prn    Essential hypertension- (present on admission)  Assessment & Plan  I will continue his amlodipine and he has PRN labetalol if needed       VTE prophylaxis: SCD

## 2020-07-12 NOTE — CARE PLAN
Problem: Safety  Goal: Will remain free from falls  Intervention: Implement fall precautions  Note: Safety precautions and fall prevention in place. Fall prevention education provided. Patient verbalized understanding. Bed in low locked position, bed alarm on, treaded socks on patient, call bell within reach. Patient calls appropriately as needed.      Problem: Knowledge Deficit  Goal: Knowledge of disease process/condition, treatment plan, diagnostic tests, and medications will improve  Intervention: Explain information regarding disease process/condition, treatment plan, diagnostic tests, and medications and document in education  Note: Educated patient on hemoglobin status and procedure. Patient verbalizes understanding.

## 2020-07-12 NOTE — CARE PLAN
Problem: Communication  Goal: The ability to communicate needs accurately and effectively will improve  Outcome: PROGRESSING AS EXPECTED     Problem: Safety  Goal: Will remain free from injury  Outcome: PROGRESSING AS EXPECTED  Goal: Will remain free from falls  Outcome: PROGRESSING AS EXPECTED     Problem: Bowel/Gastric:  Goal: Normal bowel function is maintained or improved  Outcome: PROGRESSING AS EXPECTED  Goal: Will not experience complications related to bowel motility  Outcome: PROGRESSING AS EXPECTED     Problem: Pain Management  Goal: Pain level will decrease to patient's comfort goal  Outcome: PROGRESSING AS EXPECTED     Problem: Respiratory:  Goal: Respiratory status will improve  Outcome: PROGRESSING AS EXPECTED

## 2020-07-12 NOTE — ANESTHESIA POSTPROCEDURE EVALUATION
Patient: Romario Chaudhari    Procedure Summary     Date:  07/12/20 Room / Location:  [facility] OR 08 / SURGERY [facility]    Anesthesia Start:  1049 Anesthesia Stop:  1128    Procedures:       COLONOSCOPY, WITH POLYPECTOMY (N/A Anus)      GASTROSCOPY, WITH BIOPSY (N/A Mouth) Diagnosis:  ( irregular Z line/ colon pylop,)    Surgeon:  Wyatt Ocasio M.D. Responsible Provider:  Gilles Ferro D.O.    Anesthesia Type:  MAC ASA Status:  3 - Emergent          Final Anesthesia Type: MAC  Last vitals  BP   Blood Pressure : 106/59    Temp   36.6 °C (97.8 °F)    Pulse   Pulse: 70   Resp   14    SpO2   94 %      Anesthesia Post Evaluation    Patient location during evaluation: PACU  Patient participation: complete - patient participated  Level of consciousness: awake and alert  Pain score: 0    Airway patency: patent  Anesthetic complications: no  Cardiovascular status: hemodynamically stable  Respiratory status: acceptable  Hydration status: euvolemic    PONV: none           Nurse Pain Score: 0 (NPRS)

## 2020-07-12 NOTE — ANESTHESIA QCDR
2019 [facility] Clinical Data Registry (for Quality Improvement)     Postoperative nausea/vomiting risk protocol (Adult = 18 yrs and Pediatric 3-17 yrs)- (430 and 463)  General inhalation anesthetic (NOT TIVA) with PONV risk factors: No  Provision of anti-emetic therapy with at least 2 different classes of agents: N/A  Patient DID NOT receive anti-emetic therapy and reason is documented in Medical Record: N/A    Multimodal Pain Management- (477)  Non-emergent surgery AND patient age >= 18: No  Use of Multimodal Pain Management, two or more drugs and/or interventions, NOT including systemic opioids:   Exception: Documented allergy to multiple classes of analgesics:     Smoking Abstinence (404)  Patient is current smoker (cigarette, pipe, e-cig, marijuanna): No  Elective Surgery:   Abstinence instructions provided prior to day of surgery:   Patient abstained from smoking on day of surgery:     Pre-Op Beta-Blocker in Isolated CABG (44)  Isolated CABG AND patient age >= 18: No  Beta-blocker admin within 24 hours of surgical incision:   Exception:of medical reason(s) for not administering beta blocker within 24 hours prior to surgical incision (e.g., not  indicated,other medical reason):     PACU assessment of acute postoperative pain prior to Anesthesia Care End- Applies to Patients Age = 18- (ABG7)  Initial PACU pain score is which of the following: < 7/10  Patient unable to report pain score: N/A    Post-anesthetic transfer of care checklist/protocol to PACU/ICU- (426 and 427)  Upon conclusion of case, patient transferred to which of the following locations: PACU/Non-ICU  Use of transfer checklist/protocol: Yes  Exclusion: Service Performed in Patient Hospital Room (and thus did not require transfer): N/A  Unplanned admission to ICU related to anesthesia service up through end of PACU care- (MD51)  Unplanned admission to ICU (not initially anticipated at anesthesia start time): No

## 2020-07-12 NOTE — OR SURGEON
Post OP Note    PreOp Diagnosis: anemia    PostOp Diagnosis:     EGD   1/ Irregular Z line biopsied at 38 cm, otherwise normal Esophagus   2/ otherwise normal EGD   3/ biopsies of the antrum   4/normal small bowel    Colonoscopy   1/ diverticulosis in the sigmoid colon   2/ cecal polyp 5 mm snared removed retrieved with hot cautery   3/ otherwise normal colonoscopy    Procedure(s):  COLONOSCOPY, WITH POLYPECTOMY - Wound Class: Clean Contaminated  GASTROSCOPY, WITH BIOPSY - Wound Class: Clean Contaminated    Surgeon(s):  Wyatt Ocasio M.D.    Anesthesiologist/Type of Anesthesia:  Anesthesiologist: Gilles Ferro D.O./MAC    Surgical Staff:  Endoscopy Technician: Alma Malloy C.N.A.  Endoscopy Nurse: Isaias Sotomayor R.N.    Specimens removed if any:  ID Type Source Tests Collected by Time Destination   A : antrum bx Tissue Gastric PATHOLOGY SPECIMEN Wyatt Ocasio M.D. 7/12/2020 10:59 AM    B : distal Tissue Esophagus PATHOLOGY SPECIMEN Wyatt Ocasio M.D. 7/12/2020 11:01 AM    C :  Polyp Colon PATHOLOGY SPECIMEN Wyatt Ocasio M.D. 7/12/2020 11:15 AM        Estimated Blood Loss: none    Findings:     Prior to the procedure the patient was informed the risks and benefits of the procedure freely signed the consent form was monitored under standard monitoring blood pressure oxygen heart monitor no appreciable abnormality noted during the procedure.  He was placed in left lateral decubitus position bite-block was placed.  Using the standard Olympus gastroscope just under direct visualization through the oropharynx which appeared normal.  Intubation was achieved easily the entire length the esophagus appeared normal.  At the GE junction at 38 cm there is an irregular Z line concerning for short segment Gan's subsequently steroids biopsied.  Intubation the gastric cavity allowed good insufflation for panoramic view entire gas mucosa which appeared normal antrum pylorus appeared normal.  Biopsies  of the antrum were performed.  Intubation of the pylorus was performed and evaluation of the duodenal bulb second third portion of duodenum all the structures appeared normal.  Extubation the pylorus allowed retroflexion to performed the antrum about the incisura body fundus cardia and again no overt mucosal abnormalities were noted.  Gastric was straightened excess air was removed patient tolerated the procedure well.    Colonoscopy  Digital rectal exam was nontender no internal or external hemorrhoids were appreciated.  Using a standard Olympus variable stiffness colonoscope introduced under manual insertion passed the cecum identified by appendiceal orifice ileocecal valve and cecal cap.  Preparation was good.  In the cecum there is a 5 mm sessile polyp that was snared removed and retrieved using hot cautery.  The rest of the ascending colon appeared normal as did the transverse and descending colons.  Sigmoid colon there is diverticulosis appreciated.  Rectum appeared normal rectal anal verge appeared normal.  Excess air was removed patient tolerated the procedure well.    Complications: none    Recommendations    Advance diet as tolerated  Await pathology findings  Follow-up with digestive health upon discharge  Repeat colonoscopy pending pathology findings, repeat upper endoscopy depending on pathology findings.  Omeprazole 40 mg po qd  Consider outpatient capsule endoscopy for further evaluation patient's anemia  Any further questions or concerns please feel free to give us call at [phone].        7/12/2020 11:27 AM Wyatt Ocasio M.D.

## 2020-07-13 ENCOUNTER — PATIENT OUTREACH (OUTPATIENT)
Dept: HEALTH INFORMATION MANAGEMENT | Facility: OTHER | Age: 70
End: 2020-07-13

## 2020-07-13 VITALS
HEART RATE: 78 BPM | BODY MASS INDEX: 29.6 KG/M2 | WEIGHT: 223.33 LBS | DIASTOLIC BLOOD PRESSURE: 73 MMHG | SYSTOLIC BLOOD PRESSURE: 113 MMHG | TEMPERATURE: 98 F | HEIGHT: 73 IN | RESPIRATION RATE: 18 BRPM | OXYGEN SATURATION: 90 %

## 2020-07-13 LAB
ANION GAP SERPL CALC-SCNC: 13 MMOL/L (ref 7–16)
BASOPHILS # BLD AUTO: 1.1 % (ref 0–1.8)
BASOPHILS # BLD: 0.09 K/UL (ref 0–0.12)
BUN SERPL-MCNC: 11 MG/DL (ref 8–22)
CALCIUM SERPL-MCNC: 8.6 MG/DL (ref 8.5–10.5)
CHLORIDE SERPL-SCNC: 103 MMOL/L (ref 96–112)
CO2 SERPL-SCNC: 24 MMOL/L (ref 20–33)
CREAT SERPL-MCNC: 1.02 MG/DL (ref 0.5–1.4)
EOSINOPHIL # BLD AUTO: 0.3 K/UL (ref 0–0.51)
EOSINOPHIL NFR BLD: 3.8 % (ref 0–6.9)
ERYTHROCYTE [DISTWIDTH] IN BLOOD BY AUTOMATED COUNT: 56.6 FL (ref 35.9–50)
GLUCOSE SERPL-MCNC: 117 MG/DL (ref 65–99)
HCT VFR BLD AUTO: 32.7 % (ref 42–52)
HGB BLD-MCNC: 10.8 G/DL (ref 14–18)
IMM GRANULOCYTES # BLD AUTO: 0.03 K/UL (ref 0–0.11)
IMM GRANULOCYTES NFR BLD AUTO: 0.4 % (ref 0–0.9)
LYMPHOCYTES # BLD AUTO: 2.24 K/UL (ref 1–4.8)
LYMPHOCYTES NFR BLD: 28.6 % (ref 22–41)
MCH RBC QN AUTO: 33.2 PG (ref 27–33)
MCHC RBC AUTO-ENTMCNC: 33 G/DL (ref 33.7–35.3)
MCV RBC AUTO: 100.6 FL (ref 81.4–97.8)
MONOCYTES # BLD AUTO: 0.95 K/UL (ref 0–0.85)
MONOCYTES NFR BLD AUTO: 12.1 % (ref 0–13.4)
NEUTROPHILS # BLD AUTO: 4.23 K/UL (ref 1.82–7.42)
NEUTROPHILS NFR BLD: 54 % (ref 44–72)
NRBC # BLD AUTO: 0.08 K/UL
NRBC BLD-RTO: 1 /100 WBC
PATHOLOGY CONSULT NOTE: NORMAL
PLATELET # BLD AUTO: 451 K/UL (ref 164–446)
PMV BLD AUTO: 8.5 FL (ref 9–12.9)
POTASSIUM SERPL-SCNC: 3.6 MMOL/L (ref 3.6–5.5)
RBC # BLD AUTO: 3.25 M/UL (ref 4.7–6.1)
SODIUM SERPL-SCNC: 140 MMOL/L (ref 135–145)
WBC # BLD AUTO: 7.8 K/UL (ref 4.8–10.8)

## 2020-07-13 PROCEDURE — 700102 HCHG RX REV CODE 250 W/ 637 OVERRIDE(OP): Performed by: HOSPITALIST

## 2020-07-13 PROCEDURE — 700102 HCHG RX REV CODE 250 W/ 637 OVERRIDE(OP): Performed by: INTERNAL MEDICINE

## 2020-07-13 PROCEDURE — A9270 NON-COVERED ITEM OR SERVICE: HCPCS | Performed by: INTERNAL MEDICINE

## 2020-07-13 PROCEDURE — C9113 INJ PANTOPRAZOLE SODIUM, VIA: HCPCS | Performed by: HOSPITALIST

## 2020-07-13 PROCEDURE — 85025 COMPLETE CBC W/AUTO DIFF WBC: CPT

## 2020-07-13 PROCEDURE — A9270 NON-COVERED ITEM OR SERVICE: HCPCS | Performed by: HOSPITALIST

## 2020-07-13 PROCEDURE — 99239 HOSP IP/OBS DSCHRG MGMT >30: CPT | Performed by: INTERNAL MEDICINE

## 2020-07-13 PROCEDURE — 36415 COLL VENOUS BLD VENIPUNCTURE: CPT

## 2020-07-13 PROCEDURE — 700111 HCHG RX REV CODE 636 W/ 250 OVERRIDE (IP): Performed by: HOSPITALIST

## 2020-07-13 PROCEDURE — 80048 BASIC METABOLIC PNL TOTAL CA: CPT

## 2020-07-13 RX ORDER — OMEPRAZOLE 20 MG/1
40 CAPSULE, DELAYED RELEASE ORAL DAILY
Status: DISCONTINUED | OUTPATIENT
Start: 2020-07-13 | End: 2020-07-13 | Stop reason: HOSPADM

## 2020-07-13 RX ORDER — OMEPRAZOLE 40 MG/1
40 CAPSULE, DELAYED RELEASE ORAL DAILY
Qty: 30 CAP | Refills: 0 | Status: SHIPPED | OUTPATIENT
Start: 2020-07-13 | End: 2020-07-13

## 2020-07-13 RX ORDER — PANTOPRAZOLE SODIUM 40 MG/1
40 TABLET, DELAYED RELEASE ORAL DAILY
Qty: 30 TAB | Refills: 0 | Status: SHIPPED | OUTPATIENT
Start: 2020-07-13

## 2020-07-13 RX ADMIN — GABAPENTIN 100 MG: 100 CAPSULE ORAL at 05:54

## 2020-07-13 RX ADMIN — OMEPRAZOLE 40 MG: 20 CAPSULE, DELAYED RELEASE ORAL at 09:30

## 2020-07-13 RX ADMIN — AMLODIPINE BESYLATE 10 MG: 10 TABLET ORAL at 05:54

## 2020-07-13 RX ADMIN — PANTOPRAZOLE SODIUM 40 MG: 40 INJECTION, POWDER, LYOPHILIZED, FOR SOLUTION INTRAVENOUS at 05:55

## 2020-07-13 ASSESSMENT — ENCOUNTER SYMPTOMS
BLURRED VISION: 0
MYALGIAS: 0
COUGH: 0
FEVER: 0
CHILLS: 0
DEPRESSION: 0

## 2020-07-13 NOTE — PROGRESS NOTES
Gastroenterology Progress Note     Author: Karen Kelly M.D.   Date & Time Created: 7/13/2020 12:19 PM    Chief Complaint:   Anemia and GI bleed   Interval History:    Romario Chaudhari is a 70 y.o. 7/10/2020 as a direct admit from [place] for bloody/dark tarry stools.  This started Wednesday night and he had about 20 episodes, though he has not had any since about 5 PM Thursday.  He said he was treated for H pylori in the past, and at the end of June he was treated for Campylobacter with azithromycin.  He does have history of GI bleed in 2007 which required a transfusion.  With the Campylobacter episode he had significant abdominal pain and cramping, though this resolved, as did the bleeding at that time.      EGD 7/12/2020 GE junction 38 cm with irregular z line short segment Gan's biopsied.     Colonoscopy 7/12/2020: Sigmoid diverticulosis with cecal polyps 5 mm snared removed.     7/13: Patient denies N,V, Abdominal pain, no bloody bowel movement, stable HGB.     Review of Systems:  Review of Systems   Constitutional: Negative for chills and fever.   Eyes: Negative for blurred vision.   Respiratory: Negative for cough.    Cardiovascular: Negative for chest pain.   Genitourinary: Negative for dysuria.   Musculoskeletal: Negative for myalgias.   Psychiatric/Behavioral: Negative for depression.       Physical Exam:  Physical Exam   Constitutional: He appears well-developed.   HENT:   Head: Normocephalic.   Neck: Normal range of motion.   Cardiovascular: Normal rate.   Pulmonary/Chest: No respiratory distress.   Abdominal: Soft. Bowel sounds are normal. He exhibits no distension. There is no abdominal tenderness. There is no rebound.       Labs:          Recent Labs     07/12/20  0006 07/13/20  0407   SODIUM 136 140   POTASSIUM 3.7 3.6   CHLORIDE 103 103   CO2 19* 24   BUN 14 11   CREATININE 0.91 1.02   CALCIUM 8.2* 8.6     Recent Labs     07/12/20  0006 07/13/20  0407   GLUCOSE 92 117*     Recent Labs      07/12/20  0006 07/12/20  0826 07/12/20  1632 07/13/20  0407   RBC 3.33*  --   --  3.25*   HEMOGLOBIN 11.1* 11.7* 10.6* 10.8*   HEMATOCRIT 34.6*  --   --  32.7*   PLATELETCT 439  --   --  451*     Recent Labs     07/12/20  0006 07/13/20  0407   WBC 8.4 7.8   NEUTSPOLYS  --  54.00   LYMPHOCYTES  --  28.60   MONOCYTES  --  12.10   EOSINOPHILS  --  3.80   BASOPHILS  --  1.10       Imaging:    Imaging reviewed.    Assessment and plan:     # Patient with Macrocytic anemia presented with Melena.   EGD 7/12/2020 GE junction 38 cm with irregular z line short segment Gan's biopsied.   Colonoscopy 7/12/2020: Sigmoid diverticulosis with cecal polyps 5 mm snared removed.   Stable HGB since admission, no bloody stool, denies GI symptoms    - Outpatient Capsule endoscopy ([facility] per patient preference as he lives in [place])  - Follow up with [facility]  - Omeprazole 40 mg po daily   - Follow up on biopsy results.   - Macrocytic anemia work up as outpatient     GI will sign off, thank you for your consult, please re-consult if in need.     Quality-Core Measures   Reviewed items::  Labs reviewed  Munoz catheter::  No Munoz  DVT prophylaxis - mechanical:  SCDs

## 2020-07-13 NOTE — DISCHARGE INSTRUCTIONS
Discharge Instructions    Discharged to home by car with relative. Discharged via wheelchair, hospital escort: Yes.  Special equipment needed: Not Applicable    Be sure to schedule a follow-up appointment with your primary care doctor or any specialists as instructed.     Discharge Plan:   Diet Plan: Discussed  Activity Level: Discussed  Confirmed Follow up Appointment: Patient to Call and Schedule Appointment  Confirmed Symptoms Management: Discussed  Medication Reconciliation Updated: Yes    I understand that a diet low in cholesterol, fat, and sodium is recommended for good health. Unless I have been given specific instructions below for another diet, I accept this instruction as my diet prescription.       Special Instructions: None    · Is patient discharged on Warfarin / Coumadin?   No     Depression / Suicide Risk    As you are discharged from this [facility] facility, it is important to learn how to keep safe from harming yourself.    Recognize the warning signs:  · Abrupt changes in personality, positive or negative- including increase in energy   · Giving away possessions  · Change in eating patterns- significant weight changes-  positive or negative  · Change in sleeping patterns- unable to sleep or sleeping all the time   · Unwillingness or inability to communicate  · Depression  · Unusual sadness, discouragement and loneliness  · Talk of wanting to die  · Neglect of personal appearance   · Rebelliousness- reckless behavior  · Withdrawal from people/activities they love  · Confusion- inability to concentrate     If you or a loved one observes any of these behaviors or has concerns about self-harm, here's what you can do:  · Talk about it- your feelings and reasons for harming yourself  · Remove any means that you might use to hurt yourself (examples: pills, rope, extension cords, firearm)  · Get professional help from the community (Mental Health, Substance Abuse, psychological counseling)  · Do not  be alone:Call your Safe Contact- someone whom you trust who will be there for you.  · Call your local CRISIS HOTLINE 528-6599 or 776-136-7254  · Call your local Children's Mobile Crisis Response Team [place] (147) 385-2915 or www.[employer]  · Call the toll free National Suicide Prevention Hotlines   · National Suicide Prevention Lifeline 630-508-HZXP (9291)  · [employer] Hope Line Network 800-SUICIDE (633-2104)          Gastrointestinal Bleeding  Gastrointestinal (GI) bleeding is bleeding somewhere along the path that food travels through the body (digestive tract). This path is anywhere between the mouth and the opening of the butt (anus). You may have blood in your poop (stool) or have black poop. If you throw up (vomit), there may be blood in it.  This condition can be mild, serious, or even life-threatening. If you have a lot of bleeding, you may need to stay in the hospital.  What are the causes?  This condition may be caused by:  · Irritation and swelling of the esophagus (esophagitis). The esophagus is part of the body that moves food from your mouth to your stomach.  · Swollen veins in the butt (hemorrhoids).  · Areas of painful tearing in the opening of the butt (anal fissures). These are often caused by passing hard poop.  · Pouches that form on the colon over time (diverticulosis).  · Irritation and swelling (diverticulitis) in areas where pouches have formed on the colon.  · Growths (polyps) or cancer. Colon cancer often starts out as growths that are not cancer.  · Irritation of the stomach lining (gastritis).  · Sores (ulcers) in the stomach.  What increases the risk?  You are more likely to develop this condition if you:  · Have a certain type of infection in your stomach (Helicobacter pylori infection).  · Take certain medicines.  · Smoke.  · Drink alcohol.  What are the signs or symptoms?  Common symptoms of this condition include:  · Throwing up (vomiting) material that has bright red  blood in it. It may look like coffee grounds.  · Changes in your poop. The poop may:  ? Have red blood in it.  ? Be black, look like tar, and smell stronger than normal.  ? Be red.  · Pain or cramping in the belly (abdomen).  How is this treated?  Treatment for this condition depends on the cause of the bleeding. For example:  · Sometimes, the bleeding can be stopped during a procedure that is done to find the problem (endoscopy or colonoscopy).  · Medicines can be used to:  ? Help control irritation, swelling, or infection.  ? Reduce acid in your stomach.  · Certain problems can be treated with:  ? Creams.  ? Medicines that are put in the butt (suppositories).  ? Warm baths.  · Surgery is sometimes needed.  · If you lose a lot of blood, you may need a blood transfusion.  If bleeding is mild, you may be allowed to go home. If there is a lot of bleeding, you will need to stay in the hospital.  Follow these instructions at home:    · Take over-the-counter and prescription medicines only as told by your doctor.  · Eat foods that have a lot of fiber in them. These foods include beans, whole grains, and fresh fruits and vegetables. You can also try eating 1-3 prunes each day.  · Drink enough fluid to keep your pee (urine) pale yellow.  · Keep all follow-up visits as told by your doctor. This is important.  Contact a doctor if:  · Your symptoms do not get better.  Get help right away if:  · Your bleeding does not stop.  · You feel dizzy or you pass out (faint).  · You feel weak.  · You have very bad cramps in your back or belly.  · You pass large clumps of blood (clots) in your poop.  · Your symptoms are getting worse.  · You have chest pain or fast heartbeats.  Summary  · GI bleeding is bleeding somewhere along the path that food travels through the body (digestive tract).  · This bleeding can be caused by many things. Treatment depends on the cause of the bleeding.  · Take medicines only as told by your doctor.  · Keep  all follow-up visits as told by your doctor. This is important.  This information is not intended to replace advice given to you by your health care provider. Make sure you discuss any questions you have with your health care provider.  Document Released: 09/26/2009 Document Revised: 07/31/2019 Document Reviewed: 07/31/2019  [email or web address]) Patient Education © 2020 [employer]      Pantoprazole tablets  What is this medicine?  PANTOPRAZOLE (pan TOE pra zole) prevents the production of acid in the stomach. It is used to treat gastroesophageal reflux disease (GERD), inflammation of the esophagus, and Zollinger-Robbins syndrome.  This medicine may be used for other purposes; ask your health care provider or pharmacist if you have questions.  COMMON BRAND NAME(S): Protonix  What should I tell my health care provider before I take this medicine?  They need to know if you have any of these conditions:  · liver disease  · low levels of magnesium in the blood  · lupus  · an unusual or allergic reaction to omeprazole, lansoprazole, pantoprazole, rabeprazole, other medicines, foods, dyes, or preservatives  · pregnant or trying to get pregnant  · breast-feeding  How should I use this medicine?  Take this medicine by mouth. Swallow the tablets whole with a drink of water. Follow the directions on the prescription label. Do not crush, break, or chew. Take your medicine at regular intervals. Do not take your medicine more often than directed.  Talk to your pediatrician regarding the use of this medicine in children. While this drug may be prescribed for children as young as 5 years for selected conditions, precautions do apply.  Overdosage: If you think you have taken too much of this medicine contact a poison control center or emergency room at once.  NOTE: This medicine is only for you. Do not share this medicine with others.  What if I miss a dose?  If you miss a dose, take it as soon as you can. If it is almost time for your next  dose, take only that dose. Do not take double or extra doses.  What may interact with this medicine?  Do not take this medicine with any of the following medications:  · atazanavir  · nelfinavir  This medicine may also interact with the following medications:  · ampicillin  · delavirdine  · erlotinib  · iron salts  · medicines for fungal infections like ketoconazole, itraconazole and voriconazole  · methotrexate  · mycophenolate mofetil  · warfarin  This list may not describe all possible interactions. Give your health care provider a list of all the medicines, herbs, non-prescription drugs, or dietary supplements you use. Also tell them if you smoke, drink alcohol, or use illegal drugs. Some items may interact with your medicine.  What should I watch for while using this medicine?  It can take several days before your stomach pain gets better. Check with your doctor or health care provider if your condition does not start to get better, or if it gets worse.  This medicine may cause serious skin reactions. They can happen weeks to months after starting the medicine. Contact your health care provider right away if you notice fevers or flu-like symptoms with a rash. The rash may be red or purple and then turn into blisters or peeling of the skin. Or, you might notice a red rash with swelling of the face, lips or lymph nodes in your neck or under your arms.  You may need blood work done while you are taking this medicine.  This medicine may cause a decrease in vitamin B12. You should make sure that you get enough vitamin B12 while you are taking this medicine. Discuss the foods you eat and the vitamins you take with your health care provider.  What side effects may I notice from receiving this medicine?  Side effects that you should report to your doctor or health care professional as soon as possible:  ·   allergic reactions like skin rash, itching or hives, swelling of the face, lips, or tongue  ·   bone, muscle or  joint pain  ·   breathing problems  ·   chest pain or chest tightness  ·   dark yellow or brown urine  ·   dizziness  ·   fast, irregular heartbeat  ·   feeling faint or lightheaded  ·   fever or sore throat  ·   muscle spasm  ·   palpitations  ·   rash on cheeks or arms that gets worse in the sun  ·   redness, blistering, peeling or loosening of the skin, including inside the mouth  ·   seizures  · stomach polyps  ·   tremors  ·   unusual bleeding or bruising  ·   unusually weak or tired  ·   yellowing of the eyes or skin  Side effects that usually do not require medical attention (report to your doctor or health care professional if they continue or are bothersome):  ·   constipation  ·   diarrhea  ·   dry mouth  ·   headache  ·   nausea  This list may not describe all possible side effects. Call your doctor for medical advice about side effects. You may report side effects to FDA at 1-737-FDA-8006.  Where should I keep my medicine?  Keep out of the reach of children.  Store at room temperature between 15 and 30 degrees C (59 and 86 degrees F). Protect from light and moisture. Throw away any unused medicine after the expiration date.  NOTE: This sheet is a summary. It may not cover all possible information. If you have questions about this medicine, talk to your doctor, pharmacist, or health care provider.  © 2020 [employer] (2020-03-27 13:18:32)

## 2020-07-13 NOTE — DISCHARGE SUMMARY
Discharge Summary    CHIEF COMPLAINT ON ADMISSION  No chief complaint on file.      Reason for Admission  GI Bleed     Admission Date  7/10/2020    CODE STATUS  Full Code    HPI & HOSPITAL COURSE     70 y.o. male who presented 7/10/2020 as a direct admit from [facility] for bloody/dark tarry stools.  This started Wednesday night and he had about 20 episodes, though he has not had any since about 5 PM Thursday.  He said he was treated for H pylori in the past, and at the end of June he was treated for Campylobacter with azithromycin.  He does have history of GI bleed in 2007 which required a transfusion.  With the Campylobacter episode he had significant abdominal pain and cramping, though this resolved, as did the bleeding at that time.     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  He was admitted for GI bleed on aspirin and Plavix which have now been held.  H&H remained stable during this admission.  No further episodes of bleeding noted.  He did have an EGD as well as colonoscopy and had biopsies completed.  As per GI would recommend continued use of PPI daily.    Patient remained in sinus rhythm with no cardiac complaints.  We have advised the patient to continue to hold aspirin and Plavix until he follows up with cardiology for further recommendations.  He will need clearance from GI prior to reinitiation.    As per GI recommendations, PPI daily.  If recurrent bleed would consider capsule endoscopy.  He is advised to follow-up with GI for pathology results.  He will follow[facility] with [facility] as recommended.     [facility] as well as [facility].    Therefore, he is discharged in good and stable condition to home with close outpatient follow-up.    The patient met 2-midnight criteria for an inpatient stay at the time of discharge.    Discharge Date  7/13    FOLLOW UP ITEMS POST DISCHARGE    F/u with pcp/dc clinic 1 week  DHA as scheduled  for biopsy results  -Cardiology in 4-6 weeks    DISCHARGE DIAGNOSES  Principal Problem:    Gastrointestinal hemorrhage with melena POA: Yes  Active Problems:    Essential hypertension POA: Yes    Hx of CABG POA: Yes  Resolved Problems:    * No resolved hospital problems. *      FOLLOW UP  No future appointments.  [place] [id number] [place] [id number] [phone]    Please call to establish with a Primary Care Physician and schedule a hospital follow up with your Primary Care Physician. Thank you       MEDICATIONS ON DISCHARGE     Medication List      CHANGE how you take these medications      Instructions   pantoprazole 40 MG Tbec  What changed:  how much to take  Commonly known as:  PROTONIX   Take 1 Tab by mouth every day.  Dose:  40 mg        CONTINUE taking these medications      Instructions   albuterol 108 (90 Base) MCG/ACT Aers inhalation aerosol   Inhale 2 Puffs by mouth every four hours as needed for Shortness of Breath.  Dose:  2 Puff     ALPRAZolam 0.25 MG Tabs  Commonly known as:  XANAX   Take 0.25 mg by mouth at bedtime as needed for Sleep.  Dose:  0.25 mg     amLODIPine 10 MG Tabs  Commonly known as:  NORVASC   Take 1 Tab by mouth every day.  Dose:  10 mg     artificial tears 1.4 % Soln   Place 1 Drop in both eyes every 2 hours as needed (eye irritation).  Dose:  1 Drop     * gabapentin 100 MG Caps  Commonly known as:  NEURONTIN   Take 100 mg by mouth 2 Times a Day.  Dose:  100 mg     * gabapentin 100 MG Caps  Commonly known as:  NEURONTIN   Take 100 mg by mouth.  Dose:  100 mg         * This list has 2 medication(s) that are the same as other medications prescribed for you. Read the directions carefully, and ask your doctor or other care provider to review them with you.            STOP taking these medications    aspirin 81 MG tablet     azithromycin 250 MG Tabs  Commonly known as:  ZITHROMAX     clopidogrel 75 MG Tabs  Commonly known as:  PLAVIX     furosemide 40 MG  Tabs  Commonly known as:  LASIX     potassium chloride SA 10 MEQ Tbcr  Commonly known as:  K-DUR     predniSONE 20 MG Tabs  Commonly known as:  DELTASONE            Allergies  No Known Allergies    DIET  Orders Placed This Encounter   Procedures   • Diet Order Regular     Standing Status:   Standing     Number of Occurrences:   1     Order Specific Question:   Diet:     Answer:   Regular [1]       ACTIVITY  As tolerated.  Weight bearing as tolerated    CONSULTATIONS  GI    PROCEDURES  egd/colonoscopy    LABORATORY  Lab Results   Component Value Date    SODIUM 140 07/13/2020    POTASSIUM 3.6 07/13/2020    CHLORIDE 103 07/13/2020    CO2 24 07/13/2020    GLUCOSE 117 (H) 07/13/2020    BUN 11 07/13/2020    CREATININE 1.02 07/13/2020        Lab Results   Component Value Date    WBC 7.8 07/13/2020    HEMOGLOBIN 10.8 (L) 07/13/2020    HEMATOCRIT 32.7 (L) 07/13/2020    PLATELETCT 451 (H) 07/13/2020        Total time of the discharge process exceeds 45 minutes.

## 2020-07-13 NOTE — PROGRESS NOTES
Patient discharged from unit to home, via car, with family, via self. Discharge instructions reviewed with patient, all questions answered, paperwork signed. Tele box removed, IV removed. Monitor room notified. Patient escorted from unit with RN to home.

## 2020-07-13 NOTE — PROGRESS NOTES
Assumed patient care. Bedside report received. Patient's plan of care reviewed. Patient found sitting up in bed and at edge of bed, A&Ox 4. On RA. VSS. No signs of distress, declines pain at this time. All needs met. Safety precautions in place. Tele box on. Bed in lowest/locked position. Bed alarm on. Call light and personal belongings within reach. Will continue to monitor.

## 2021-03-19 NOTE — DISCHARGE PLANNING
Anticipated Discharge Disposition: d/c w/ [facility] and o2     Action: [facility] indicates they cant accept pt because they dont accept their payor.    LSw left VM w/ [facility] explaining as above.     LSW spoke to Rea @ phone [phone] & fax [phone] w/ pt's work fund payor. Rea is surprised the o2 was not delivered last night. She worked with [facility] and they stated they would deliver everything last night.    Rea will call [facility] this AM to acquire information.   [facility]  [address] [place] [address] [address]  [place] [address]  Phone:  [phone]    LSw updated dme o2 choice for [facility] and provided to [facility].    lsw attempted to call [facility] again but phone rang w/o answer.    Barriers to Discharge: receipt of DME o2 equipment     Plan: f/u w/ [facility] and medical team     No

## 2025-07-15 ENCOUNTER — APPOINTMENT (OUTPATIENT)
Dept: RADIOLOGY | Facility: MEDICAL CENTER | Age: 75
DRG: 065 | End: 2025-07-15
Attending: STUDENT IN AN ORGANIZED HEALTH CARE EDUCATION/TRAINING PROGRAM
Payer: COMMERCIAL

## 2025-07-15 ENCOUNTER — HOSPITAL ENCOUNTER (INPATIENT)
Facility: MEDICAL CENTER | Age: 75
LOS: 2 days | DRG: 065 | End: 2025-07-17
Attending: STUDENT IN AN ORGANIZED HEALTH CARE EDUCATION/TRAINING PROGRAM | Admitting: INTERNAL MEDICINE
Payer: COMMERCIAL

## 2025-07-15 DIAGNOSIS — R53.1 ACUTE LEFT-SIDED WEAKNESS: Primary | ICD-10-CM

## 2025-07-15 PROBLEM — I63.9 ACUTE CVA (CEREBROVASCULAR ACCIDENT) (HCC): Status: ACTIVE | Noted: 2025-07-15

## 2025-07-15 PROBLEM — G20.A1 PARKINSON DISEASE (HCC): Status: ACTIVE | Noted: 2025-07-15

## 2025-07-15 LAB
ABO GROUP BLD: NORMAL
ALBUMIN SERPL BCP-MCNC: 4.3 G/DL (ref 3.2–4.9)
ALBUMIN/GLOB SERPL: 1.5 G/DL
ALP SERPL-CCNC: 76 U/L (ref 30–99)
ALT SERPL-CCNC: <5 U/L (ref 2–50)
ANION GAP SERPL CALC-SCNC: 14 MMOL/L (ref 7–16)
APTT PPP: 26.3 SEC (ref 24.7–36)
AST SERPL-CCNC: 18 U/L (ref 12–45)
BASOPHILS # BLD AUTO: 0.9 % (ref 0–1.8)
BASOPHILS # BLD: 0.08 K/UL (ref 0–0.12)
BILIRUB SERPL-MCNC: 1.1 MG/DL (ref 0.1–1.5)
BLD GP AB SCN SERPL QL: NORMAL
BUN SERPL-MCNC: 15 MG/DL (ref 8–22)
BURR CELLS BLD QL SMEAR: NORMAL
CALCIUM ALBUM COR SERPL-MCNC: 8.9 MG/DL (ref 8.5–10.5)
CALCIUM SERPL-MCNC: 9.1 MG/DL (ref 8.5–10.5)
CHLORIDE SERPL-SCNC: 106 MMOL/L (ref 96–112)
CO2 SERPL-SCNC: 19 MMOL/L (ref 20–33)
CREAT SERPL-MCNC: 0.91 MG/DL (ref 0.5–1.4)
EKG IMPRESSION: NORMAL
EOSINOPHIL # BLD AUTO: 0 K/UL (ref 0–0.51)
EOSINOPHIL NFR BLD: 0 % (ref 0–6.9)
ERYTHROCYTE [DISTWIDTH] IN BLOOD BY AUTOMATED COUNT: 53.6 FL (ref 35.9–50)
EST. AVERAGE GLUCOSE BLD GHB EST-MCNC: 128 MG/DL
GFR SERPLBLD CREATININE-BSD FMLA CKD-EPI: 88 ML/MIN/1.73 M 2
GLOBULIN SER CALC-MCNC: 2.8 G/DL (ref 1.9–3.5)
GLUCOSE SERPL-MCNC: 109 MG/DL (ref 65–99)
HBA1C MFR BLD: 6.1 % (ref 4–5.6)
HCT VFR BLD AUTO: 51.1 % (ref 42–52)
HGB BLD-MCNC: 17.5 G/DL (ref 14–18)
INR PPP: 1.18 (ref 0.87–1.13)
LYMPHOCYTES # BLD AUTO: 1.28 K/UL (ref 1–4.8)
LYMPHOCYTES NFR BLD: 14.9 % (ref 22–41)
MANUAL DIFF BLD: NORMAL
MCH RBC QN AUTO: 32.8 PG (ref 27–33)
MCHC RBC AUTO-ENTMCNC: 34.2 G/DL (ref 32.3–36.5)
MCV RBC AUTO: 95.9 FL (ref 81.4–97.8)
METAMYELOCYTES NFR BLD MANUAL: 0.9 %
MONOCYTES # BLD AUTO: 0.5 K/UL (ref 0–0.85)
MONOCYTES NFR BLD AUTO: 6.1 % (ref 0–13.4)
MORPHOLOGY BLD-IMP: NORMAL
NEUTROPHILS # BLD AUTO: 6.64 K/UL (ref 1.82–7.42)
NEUTROPHILS NFR BLD: 77.2 % (ref 44–72)
NRBC # BLD AUTO: 0.02 K/UL
NRBC BLD-RTO: 0.2 /100 WBC (ref 0–0.2)
PLATELET # BLD AUTO: 290 K/UL (ref 164–446)
PLATELET BLD QL SMEAR: NORMAL
PMV BLD AUTO: 8.8 FL (ref 9–12.9)
POIKILOCYTOSIS BLD QL SMEAR: NORMAL
POTASSIUM SERPL-SCNC: 4.2 MMOL/L (ref 3.6–5.5)
PROT SERPL-MCNC: 7.1 G/DL (ref 6–8.2)
PROTHROMBIN TIME: 15 SEC (ref 12–14.6)
RBC # BLD AUTO: 5.33 M/UL (ref 4.7–6.1)
RBC BLD AUTO: PRESENT
RH BLD: NORMAL
SODIUM SERPL-SCNC: 139 MMOL/L (ref 135–145)
TARGETS BLD QL SMEAR: NORMAL
TROPONIN T SERPL-MCNC: 18 NG/L (ref 6–19)
WBC # BLD AUTO: 8.6 K/UL (ref 4.8–10.8)

## 2025-07-15 PROCEDURE — 80053 COMPREHEN METABOLIC PANEL: CPT

## 2025-07-15 PROCEDURE — 84484 ASSAY OF TROPONIN QUANT: CPT

## 2025-07-15 PROCEDURE — 86850 RBC ANTIBODY SCREEN: CPT

## 2025-07-15 PROCEDURE — 70498 CT ANGIOGRAPHY NECK: CPT

## 2025-07-15 PROCEDURE — A9270 NON-COVERED ITEM OR SERVICE: HCPCS | Performed by: INTERNAL MEDICINE

## 2025-07-15 PROCEDURE — 86901 BLOOD TYPING SEROLOGIC RH(D): CPT

## 2025-07-15 PROCEDURE — 700102 HCHG RX REV CODE 250 W/ 637 OVERRIDE(OP): Performed by: INTERNAL MEDICINE

## 2025-07-15 PROCEDURE — 700117 HCHG RX CONTRAST REV CODE 255: Performed by: STUDENT IN AN ORGANIZED HEALTH CARE EDUCATION/TRAINING PROGRAM

## 2025-07-15 PROCEDURE — 85610 PROTHROMBIN TIME: CPT

## 2025-07-15 PROCEDURE — 71045 X-RAY EXAM CHEST 1 VIEW: CPT

## 2025-07-15 PROCEDURE — 770020 HCHG ROOM/CARE - TELE (206)

## 2025-07-15 PROCEDURE — 94760 N-INVAS EAR/PLS OXIMETRY 1: CPT

## 2025-07-15 PROCEDURE — 36415 COLL VENOUS BLD VENIPUNCTURE: CPT

## 2025-07-15 PROCEDURE — 0042T CT-CEREBRAL PERFUSION ANALYSIS: CPT

## 2025-07-15 PROCEDURE — 700111 HCHG RX REV CODE 636 W/ 250 OVERRIDE (IP): Mod: JZ | Performed by: INTERNAL MEDICINE

## 2025-07-15 PROCEDURE — A9270 NON-COVERED ITEM OR SERVICE: HCPCS | Performed by: STUDENT IN AN ORGANIZED HEALTH CARE EDUCATION/TRAINING PROGRAM

## 2025-07-15 PROCEDURE — 85730 THROMBOPLASTIN TIME PARTIAL: CPT

## 2025-07-15 PROCEDURE — 99223 1ST HOSP IP/OBS HIGH 75: CPT | Performed by: INTERNAL MEDICINE

## 2025-07-15 PROCEDURE — 83036 HEMOGLOBIN GLYCOSYLATED A1C: CPT

## 2025-07-15 PROCEDURE — 99285 EMERGENCY DEPT VISIT HI MDM: CPT

## 2025-07-15 PROCEDURE — 86900 BLOOD TYPING SEROLOGIC ABO: CPT

## 2025-07-15 PROCEDURE — 70496 CT ANGIOGRAPHY HEAD: CPT

## 2025-07-15 PROCEDURE — 700102 HCHG RX REV CODE 250 W/ 637 OVERRIDE(OP): Performed by: STUDENT IN AN ORGANIZED HEALTH CARE EDUCATION/TRAINING PROGRAM

## 2025-07-15 PROCEDURE — 85007 BL SMEAR W/DIFF WBC COUNT: CPT

## 2025-07-15 PROCEDURE — 93005 ELECTROCARDIOGRAM TRACING: CPT | Mod: TC | Performed by: STUDENT IN AN ORGANIZED HEALTH CARE EDUCATION/TRAINING PROGRAM

## 2025-07-15 PROCEDURE — 85027 COMPLETE CBC AUTOMATED: CPT

## 2025-07-15 RX ORDER — ATORVASTATIN CALCIUM 40 MG/1
40 TABLET, FILM COATED ORAL EVERY EVENING
Status: DISCONTINUED | OUTPATIENT
Start: 2025-07-16 | End: 2025-07-17 | Stop reason: HOSPADM

## 2025-07-15 RX ORDER — ASPIRIN 81 MG/1
81 TABLET, CHEWABLE ORAL DAILY
Status: DISCONTINUED | OUTPATIENT
Start: 2025-07-16 | End: 2025-07-17 | Stop reason: HOSPADM

## 2025-07-15 RX ORDER — OXYCODONE HYDROCHLORIDE 5 MG/1
2.5 TABLET ORAL
Refills: 0 | Status: DISCONTINUED | OUTPATIENT
Start: 2025-07-15 | End: 2025-07-17 | Stop reason: HOSPADM

## 2025-07-15 RX ORDER — MORPHINE SULFATE 4 MG/ML
2 INJECTION INTRAVENOUS
Status: DISCONTINUED | OUTPATIENT
Start: 2025-07-15 | End: 2025-07-17 | Stop reason: HOSPADM

## 2025-07-15 RX ORDER — ACETAMINOPHEN 325 MG/1
650 TABLET ORAL EVERY 6 HOURS PRN
Status: DISCONTINUED | OUTPATIENT
Start: 2025-07-15 | End: 2025-07-17 | Stop reason: HOSPADM

## 2025-07-15 RX ORDER — ASPIRIN 81 MG/1
81 TABLET ORAL DAILY
COMMUNITY

## 2025-07-15 RX ORDER — ONDANSETRON 4 MG/1
4 TABLET, ORALLY DISINTEGRATING ORAL EVERY 4 HOURS PRN
Status: DISCONTINUED | OUTPATIENT
Start: 2025-07-15 | End: 2025-07-17 | Stop reason: HOSPADM

## 2025-07-15 RX ORDER — AMOXICILLIN 250 MG
2 CAPSULE ORAL EVERY EVENING
Status: DISCONTINUED | OUTPATIENT
Start: 2025-07-15 | End: 2025-07-17 | Stop reason: HOSPADM

## 2025-07-15 RX ORDER — ONDANSETRON 2 MG/ML
4 INJECTION INTRAMUSCULAR; INTRAVENOUS EVERY 4 HOURS PRN
Status: DISCONTINUED | OUTPATIENT
Start: 2025-07-15 | End: 2025-07-17 | Stop reason: HOSPADM

## 2025-07-15 RX ORDER — POLYETHYLENE GLYCOL 3350 17 G/17G
1 POWDER, FOR SOLUTION ORAL
Status: DISCONTINUED | OUTPATIENT
Start: 2025-07-15 | End: 2025-07-17 | Stop reason: HOSPADM

## 2025-07-15 RX ORDER — ENOXAPARIN SODIUM 100 MG/ML
40 INJECTION SUBCUTANEOUS DAILY
Status: DISCONTINUED | OUTPATIENT
Start: 2025-07-15 | End: 2025-07-17 | Stop reason: HOSPADM

## 2025-07-15 RX ORDER — ASPIRIN 300 MG/1
300 SUPPOSITORY RECTAL DAILY
Status: DISCONTINUED | OUTPATIENT
Start: 2025-07-16 | End: 2025-07-17 | Stop reason: HOSPADM

## 2025-07-15 RX ORDER — ATORVASTATIN CALCIUM 40 MG/1
40 TABLET, FILM COATED ORAL ONCE
Status: COMPLETED | OUTPATIENT
Start: 2025-07-15 | End: 2025-07-15

## 2025-07-15 RX ORDER — ASPIRIN 325 MG
325 TABLET ORAL ONCE
Status: COMPLETED | OUTPATIENT
Start: 2025-07-15 | End: 2025-07-15

## 2025-07-15 RX ORDER — OXYCODONE HYDROCHLORIDE 5 MG/1
5 TABLET ORAL
Refills: 0 | Status: DISCONTINUED | OUTPATIENT
Start: 2025-07-15 | End: 2025-07-17 | Stop reason: HOSPADM

## 2025-07-15 RX ADMIN — IOHEXOL 110 ML: 350 INJECTION, SOLUTION INTRAVENOUS at 16:29

## 2025-07-15 RX ADMIN — ENOXAPARIN SODIUM 40 MG: 100 INJECTION SUBCUTANEOUS at 21:53

## 2025-07-15 RX ADMIN — ATORVASTATIN CALCIUM 40 MG: 40 TABLET, FILM COATED ORAL at 21:53

## 2025-07-15 RX ADMIN — DOCUSATE SODIUM 50 MG AND SENNOSIDES 8.6 MG 2 TABLET: 8.6; 5 TABLET, FILM COATED ORAL at 21:53

## 2025-07-15 RX ADMIN — ASPIRIN 325 MG: 325 TABLET ORAL at 21:53

## 2025-07-15 SDOH — ECONOMIC STABILITY: TRANSPORTATION INSECURITY
IN THE PAST 12 MONTHS, HAS THE LACK OF TRANSPORTATION KEPT YOU FROM MEDICAL APPOINTMENTS OR FROM GETTING MEDICATIONS?: NO

## 2025-07-15 SDOH — ECONOMIC STABILITY: TRANSPORTATION INSECURITY
IN THE PAST 12 MONTHS, HAS LACK OF RELIABLE TRANSPORTATION KEPT YOU FROM MEDICAL APPOINTMENTS, MEETINGS, WORK OR FROM GETTING THINGS NEEDED FOR DAILY LIVING?: NO

## 2025-07-15 ASSESSMENT — COGNITIVE AND FUNCTIONAL STATUS - GENERAL
TURNING FROM BACK TO SIDE WHILE IN FLAT BAD: A LITTLE
SUGGESTED CMS G CODE MODIFIER DAILY ACTIVITY: CK
WALKING IN HOSPITAL ROOM: A LOT
TOILETING: A LITTLE
MOBILITY SCORE: 17
MOVING FROM LYING ON BACK TO SITTING ON SIDE OF FLAT BED: A LITTLE
DRESSING REGULAR LOWER BODY CLOTHING: A LITTLE
DAILY ACTIVITIY SCORE: 19
MOVING TO AND FROM BED TO CHAIR: A LITTLE
STANDING UP FROM CHAIR USING ARMS: A LITTLE
PERSONAL GROOMING: A LITTLE
SUGGESTED CMS G CODE MODIFIER MOBILITY: CK
CLIMB 3 TO 5 STEPS WITH RAILING: A LITTLE
HELP NEEDED FOR BATHING: A LITTLE
EATING MEALS: A LITTLE

## 2025-07-15 ASSESSMENT — LIFESTYLE VARIABLES
ON A TYPICAL DAY WHEN YOU DRINK ALCOHOL HOW MANY DRINKS DO YOU HAVE: 1
ALCOHOL_USE: NO
EVER HAD A DRINK FIRST THING IN THE MORNING TO STEADY YOUR NERVES TO GET RID OF A HANGOVER: NO
TOTAL SCORE: 0
SUBSTANCE_ABUSE: 0
HAVE PEOPLE ANNOYED YOU BY CRITICIZING YOUR DRINKING: NO
TOTAL SCORE: 0
HOW MANY TIMES IN THE PAST YEAR HAVE YOU HAD 5 OR MORE DRINKS IN A DAY: 0
AVERAGE NUMBER OF DAYS PER WEEK YOU HAVE A DRINK CONTAINING ALCOHOL: 0
CONSUMPTION TOTAL: NEGATIVE
EVER FELT BAD OR GUILTY ABOUT YOUR DRINKING: NO
TOTAL SCORE: 0
HAVE YOU EVER FELT YOU SHOULD CUT DOWN ON YOUR DRINKING: NO
DOES PATIENT WANT TO STOP DRINKING: NO

## 2025-07-15 ASSESSMENT — ENCOUNTER SYMPTOMS
HEMOPTYSIS: 0
BRUISES/BLEEDS EASILY: 0
WEAKNESS: 1
HEADACHES: 0
PHOTOPHOBIA: 0
BACK PAIN: 0
NECK PAIN: 0
TREMORS: 0
SPEECH CHANGE: 0
WEIGHT LOSS: 0
HALLUCINATIONS: 0
NAUSEA: 0
DOUBLE VISION: 0
NERVOUS/ANXIOUS: 0
CHILLS: 0
COUGH: 0
HEARTBURN: 0
FLANK PAIN: 0
POLYDIPSIA: 0
ORTHOPNEA: 0
VOMITING: 0
FOCAL WEAKNESS: 0
SPUTUM PRODUCTION: 0
FEVER: 0
BLURRED VISION: 0
PALPITATIONS: 0

## 2025-07-15 ASSESSMENT — SOCIAL DETERMINANTS OF HEALTH (SDOH)
IN THE PAST 12 MONTHS, HAS THE ELECTRIC, GAS, OIL, OR WATER COMPANY THREATENED TO SHUT OFF SERVICE IN YOUR HOME?: NO
WITHIN THE PAST 12 MONTHS, YOU WORRIED THAT YOUR FOOD WOULD RUN OUT BEFORE YOU GOT THE MONEY TO BUY MORE: NEVER TRUE
WITHIN THE LAST YEAR, HAVE YOU BEEN AFRAID OF YOUR PARTNER OR EX-PARTNER?: NO
WITHIN THE LAST YEAR, HAVE YOU BEEN KICKED, HIT, SLAPPED, OR OTHERWISE PHYSICALLY HURT BY YOUR PARTNER OR EX-PARTNER?: NO
WITHIN THE LAST YEAR, HAVE YOU BEEN HUMILIATED OR EMOTIONALLY ABUSED IN OTHER WAYS BY YOUR PARTNER OR EX-PARTNER?: NO
WITHIN THE LAST YEAR, HAVE TO BEEN RAPED OR FORCED TO HAVE ANY KIND OF SEXUAL ACTIVITY BY YOUR PARTNER OR EX-PARTNER?: NO
WITHIN THE PAST 12 MONTHS, THE FOOD YOU BOUGHT JUST DIDN'T LAST AND YOU DIDN'T HAVE MONEY TO GET MORE: NEVER TRUE

## 2025-07-15 ASSESSMENT — PAIN DESCRIPTION - PAIN TYPE: TYPE: ACUTE PAIN

## 2025-07-15 ASSESSMENT — PATIENT HEALTH QUESTIONNAIRE - PHQ9
2. FEELING DOWN, DEPRESSED, IRRITABLE, OR HOPELESS: NOT AT ALL
1. LITTLE INTEREST OR PLEASURE IN DOING THINGS: NOT AT ALL
SUM OF ALL RESPONSES TO PHQ9 QUESTIONS 1 AND 2: 0

## 2025-07-15 NOTE — ED TRIAGE NOTES
Chief Complaint   Patient presents with    Unilateral Weakness     L sided. LKW: 1130      Pt BIB [facility] for above. Pt had some expressive aphasia as well as some L sided weakness. Pt arrives with symptoms resolved at this time. FSBS 146.

## 2025-07-15 NOTE — ED PROVIDER NOTES
ER Provider Note    Scribed for Navin Ruff M.d. by Mona Manriquez. 7/15/2025  4:14 PM    Primary Care Provider: None pertinent.     CHIEF COMPLAINT   Chief Complaint   Patient presents with    Unilateral Weakness     L sided. LKW: 1130      EXTERNAL RECORDS REVIEWED  Inpatient Notes Patient was admitted 2020 for GI hemorrhage with melena and discharged after subsequent treatment.     HPI/ROS  LIMITATION TO HISTORY   Select: : None  OUTSIDE HISTORIAN(S):  EMS brought in patient and gave report as seen below    Romario Chaudhari is a 75 y.o. male who presents to the ED via EMS for evaluation of possible stroke onset almost 5 hours ago at 11:30 AM. EMS explains that patient had left sided weakness and  upon arrival. EMS reports GCS 15 and blood sugar of 148. Patient reports bilateral leg weakness, loss of sensation in his left arm, and decreased strength. Denies any changes in facial sensation, visual changes, or slurred speech. No chest pain, abdominal pain, or recent illness.  He describes difficulty standing up and walking. He wears prescription contacts.  Patient has a history of Parkinson's disease and triple bypass surgery. No use of blood thinners. He is able to correctly answer the current year, but has difficulty answering the month, but notes he usually has a hard time recalling month.    PAST MEDICAL HISTORY  Past Medical History[1]    SURGICAL HISTORY  Past Surgical History[2]    FAMILY HISTORY  Family History   Problem Relation Age of Onset    Heart Attack Father 86    Dementia Father     Dementia Mother        SOCIAL HISTORY   reports that he has never smoked. He has never used smokeless tobacco. He reports current alcohol use. He reports that he does not use drugs.    CURRENT MEDICATIONS  Previous Medications    ALBUTEROL 108 (90 BASE) MCG/ACT AERO SOLN INHALATION AEROSOL    Inhale 2 Puffs by mouth every four hours as needed for Shortness of Breath.    ALPRAZOLAM (XANAX) 0.25 MG TAB    Take 0.25 mg  Ultrasound guided midline placed to RUE    "by mouth at bedtime as needed for Sleep.    AMLODIPINE (NORVASC) 10 MG TAB    Take 1 Tab by mouth every day.    ARTIFICIAL TEARS 1.4 % SOLUTION    Place 1 Drop in both eyes every 2 hours as needed (eye irritation).    GABAPENTIN (NEURONTIN) 100 MG CAP    Take 100 mg by mouth 2 Times a Day.    GABAPENTIN (NEURONTIN) 100 MG CAP    Take 100 mg by mouth.    PANTOPRAZOLE (PROTONIX) 40 MG TABLET DELAYED RESPONSE    Take 1 Tab by mouth every day.       ALLERGIES  Patient has no known allergies.    PHYSICAL EXAM  Ht 1.854 m (6' 1\")   Wt 96 kg (211 lb 10.3 oz)   BMI 27.92 kg/m²   Constitutional: Well developed, Well nourished, No acute distress, Non-toxic appearance.   HENT: Normocephalic, Atraumatic, Bilateral external ears normal, Oropharynx is clear mucous membranes are moist. No oral exudates or nasal discharge.   Eyes: Pupils are equal round and reactive, EOMI, Conjunctiva normal, No discharge.   Neck: Normal range of motion, No tenderness, Supple, No stridor. No meningismus.  Lymphatic: No lymphadenopathy noted.   Cardiovascular: Regular rate and rhythm without murmur rub or gallop.  Thorax & Lungs: Clear breath sounds bilaterally without wheezes, rhonchi or rales. There is no chest wall tenderness.   Abdomen: Soft non-tender non-distended. There is no rebound or guarding. No organomegaly is appreciated. Bowel sounds are normal.  Skin: Normal without rash.   Back: No CVA or spinal tenderness.   Extremities: Intact distal pulses, No edema, No tenderness, No cyanosis, No clubbing. Capillary refill is less than 2 seconds.  Musculoskeletal: Good range of motion in all major joints. No tenderness to palpation or major deformities noted.   Neurologic: Alert, Disoriented to month, but oriented to year, Normal motor function, Normal sensory function, No focal deficits noted. Reflexes are normal. Cranial nerves intact. Weakness with left hand  and flexor and extensors of left elbow.   Psychiatric: Affect normal, Judgment " normal, Mood normal. There is no suicidal ideation or patient reported hallucinations.     DIAGNOSTIC STUDIES    EKG/LABS  I have independently interpreted the below labs.   Labs Reviewed   COD (ADULT)   PROTHROMBIN TIME   APTT   CBC WITH DIFFERENTIAL   COMP METABOLIC PANEL   TROPONIN   I have independently interpreted the above labs.     Results for orders placed or performed during the hospital encounter of 07/15/25   EKG (NOW)   Result Value Ref Range    Report       [facility] Emergency Dept[facility]    Test Date:  2025-07-15  Pt Name:    TANISHA WILKINSON                 Department: ER  MRN:        [id number]                      Room:        06  Gender:     Male                         Technician: [id number]  :        1950                   Requested By:ARLEEN ART  Order #:    [id number]                    Reading MD:    Measurements  Intervals                                Axis  Rate:       70                           P:          16  CA:         236                          QRS:        80  QRSD:       133                          T:          20  QT:         441  QTc:        476    Interpretive Statements  Sinus rhythm  Atrial premature complex  Prolonged CA interval  Right bundle branch block  Compared to ECG 2020 06:36:44  Atrial premature complex(es) now present  Sinus bradycardia no longer present       I have independently interpreted this EKG.  Normal sinus rhythm, ventricular rate of 90, right bundle branch block.  No ST or T wave changes to suggest acute MI.      RADIOLOGY/PROCEDURES   The attending emergency physician has independently interpreted the diagnostic imaging associated with this visit and am waiting the final reading from the radiologist.   My preliminary interpretation is a follows: Nose large vessel occlusion, aneurysm, intracranial hemorrhage, dissection or significant stenosis    Radiologist interpretation:  DX-CHEST-PORTABLE (1 VIEW)   Final Result         No  "acute cardiac or pulmonary abnormality is identified.      CT-CTA NECK WITH & W/O-POST PROCESSING   Final Result      CT angiogram of the neck within normal limits.      CT-CTA HEAD WITH & W/O-POST PROCESS   Final Result      Atherosclerosis without significant stenosis, occlusion, or aneurysm.      CT-CEREBRAL PERFUSION ANALYSIS   Final Result      1. Cerebral blood flow less than 30% possibly representing completed infarct = 0 mL. Based on distribution of this finding, this is unlikely to represent artifact.      2. T Max more than 6 seconds possibly representing combination of completed infarct and ischemia = 3 mL. Based on the distribution of this finding, this is possibly artifact.      3. Mismatched volume possibly representing ischemic brain/penumbra= 3 mL      4.  Please note that this cerebral perfusion study and report is Quantitative and targets supratentorial (cerebral) perfusion for evaluation of large vessel territory acute ischemia/infarction. For example, lacunar infarcts, and brainstem/posterior fossa    ischemia/infarction are not evaluated on this study.  Data acquisition is subject to artifacts which can yield non-anatomically plausible perfusion maps which may be due to motion, bolus timing, signal to noise ratio, or other technical factors.    Perfusion map abnormalities which show non-anatomic distributions are likely artifact.   This study is not \"stand-alone\" and should only be utilized for diagnosis, management/treatment in correlation with CT, CTA, and/or MRI and clinical factors.             COURSE & MEDICAL DECISION MAKING     ASSESSMENT, COURSE AND PLAN  Care Narrative:   STROKE:   Time seen 4:09 PM (Time)     National Institutes of Health (NIH) Stroke Scale   NIH Stroke Scale    NIH of 1 for left upper extremity weakness, patient is not a candidate due to being out of the window for lytics    4:09 PM - Patient seen and examined at charge for stroke alert.  Patient had episode of " slurred speech, left upper and left lower extremity weakness.  Majority of symptoms have since resolved, last known well approximately 1130, 5 hours ago.  Patient does take aspirin, no other blood thinners, on exam does have some  strength weakness to left upper extremity, no pronator drift, no facial droop, no sensory deficits.  Patient immediately taken to the scanner for further imaging.  Discussed plan of care, including proceeding with stroke protocol. Patient agrees to the plan of care. Ordered for EKG, COD (Adult), CT-CTA Neck w/ and w/o, CT-CTA Head w/ and w/o, CT-Cerebral Perfusion Analysis, and DX-Chest, to evaluate his symptoms.     5:40 PM - I reviewed patient's diagnostic studies at this time, and requested a bed for admission given his presentation.  Also ordered for Morphology, Platelet estimate, Peripheral smear review, and Differential manual for further evaluation.     6:07 PM - I reevaluated the patient at bedside. Patient still has left hand and arm weakness, but his other symptoms are the same as other times he was here. I discussed the patient's diagnostic study results as noted above. I informed the patient of my plan to admit today given the patient's current presentation and diagnostic study results. Patient verbalizes understanding and support with my plan for admission.     Patient's imaging did not show any evidence of LVO, aneurysm, intracranial hemorrhage or significant stenosis.  Patient still having symptoms, suspect possible underlying acute ischemic stroke.  Patient will be admitted to the hospital for completion of his workup.  Patient's labs, EKG otherwise large unremarkable.  Patient was given aspirin and statin    7:45 PM - I discussed the patient's case and the above findings with Dr. Houser (Hospitalist) who agreed to hospitalize the patient. Patient's care was transferred at this time.      ADDITIONAL PROBLEM LIST  Acute ischemic stroke      DISPOSITION AND  DISCUSSIONS  I have discussed management of the patient with the following physicians and DELILAH's:  Dr. Houser (Hospitalist)     Discussion of management with other [facility] or appropriate source(s): None       DISPOSITION:  Patient will be hospitalized by Dr. Houser (Hospitalist) in guarded condition.      FINAL DIAGNOSIS  1. Acute left-sided weakness Acute      IMona (Elisa), am scribing for, and in the presence of, Navin Ruff M.D..    Electronically signed by: Mona Manriquez (Scribe), 7/15/2025    Navin JERRY M.D. personally performed the services described in this documentation, as scribed by Mona Manriquez in my presence, and it is both accurate and complete.     The note accurately reflects work and decisions made by me.  Navin Ruff M.D.  7/15/2025  11:56 PM         [1]   Past Medical History:  Diagnosis Date    Diabetes (HCC)     Hypertension     Upper GI bleed 2007   [2]   Past Surgical History:  Procedure Laterality Date    NH UPPER GI ENDOSCOPY,BIOPSY N/A 7/12/2020    Procedure: GASTROSCOPY, WITH BIOPSY;  Surgeon: Wyatt Ocasio M.D.;  Location: [facility];  Service: Gastroenterology    COLONOSCOPY WITH POLYP N/A 7/12/2020    Procedure: COLONOSCOPY, WITH POLYPECTOMY;  Surgeon: Wyatt Ocasio M.D.;  Location: [facility];  Service: Gastroenterology    MULTIPLE CORONARY ARTERY BYPASS ENDO VEIN HARVEST  11/21/2018    Procedure: MULTIPLE CORONARY ARTERY BYPASS x3, RIGHT LEG ENDOSCOPIC VEIN HARVEST;  Surgeon: Mohan Verdin M.D.;  Location: SUR[facility]ERY [facility];  Service: Cardiac    GOOD  11/21/2018    Procedure: GOOD;  Surgeon: Mohan Verdin M.D.;  Location: [facility];  Service: Cardiac    ACHILLES TENDON REPAIR      CARPAL TUNNEL RELEASE Bilateral     CERVICAL LAMINECTOMY POSTERIOR      LUMBAR LAMINECTOMY DISKECTOMY      NERVE ULNAR TRANSFER Bilateral

## 2025-07-16 PROBLEM — Z71.89 ADVANCE CARE PLANNING: Status: ACTIVE | Noted: 2025-07-16

## 2025-07-16 PROBLEM — Z96.89 SPINAL CORD STIMULATOR STATUS: Status: ACTIVE | Noted: 2025-07-16

## 2025-07-16 LAB
ALBUMIN SERPL BCP-MCNC: 3.9 G/DL (ref 3.2–4.9)
ALBUMIN/GLOB SERPL: 1.3 G/DL
ALP SERPL-CCNC: 71 U/L (ref 30–99)
ALT SERPL-CCNC: 11 U/L (ref 2–50)
ANION GAP SERPL CALC-SCNC: 13 MMOL/L (ref 7–16)
AST SERPL-CCNC: 20 U/L (ref 12–45)
BASOPHILS # BLD AUTO: 0.8 % (ref 0–1.8)
BASOPHILS # BLD: 0.09 K/UL (ref 0–0.12)
BILIRUB SERPL-MCNC: 1.1 MG/DL (ref 0.1–1.5)
BUN SERPL-MCNC: 16 MG/DL (ref 8–22)
CALCIUM ALBUM COR SERPL-MCNC: 9.1 MG/DL (ref 8.5–10.5)
CALCIUM SERPL-MCNC: 9 MG/DL (ref 8.5–10.5)
CHLORIDE SERPL-SCNC: 106 MMOL/L (ref 96–112)
CHOLEST SERPL-MCNC: 190 MG/DL (ref 100–199)
CO2 SERPL-SCNC: 21 MMOL/L (ref 20–33)
CREAT SERPL-MCNC: 0.93 MG/DL (ref 0.5–1.4)
EOSINOPHIL # BLD AUTO: 0.2 K/UL (ref 0–0.51)
EOSINOPHIL NFR BLD: 1.8 % (ref 0–6.9)
ERYTHROCYTE [DISTWIDTH] IN BLOOD BY AUTOMATED COUNT: 54.3 FL (ref 35.9–50)
GFR SERPLBLD CREATININE-BSD FMLA CKD-EPI: 86 ML/MIN/1.73 M 2
GLOBULIN SER CALC-MCNC: 2.9 G/DL (ref 1.9–3.5)
GLUCOSE BLD STRIP.AUTO-MCNC: 102 MG/DL (ref 65–99)
GLUCOSE BLD STRIP.AUTO-MCNC: 107 MG/DL (ref 65–99)
GLUCOSE SERPL-MCNC: 106 MG/DL (ref 65–99)
HCT VFR BLD AUTO: 50.5 % (ref 42–52)
HDLC SERPL-MCNC: 46 MG/DL
HGB BLD-MCNC: 17.2 G/DL (ref 14–18)
IMM GRANULOCYTES # BLD AUTO: 0.04 K/UL (ref 0–0.11)
IMM GRANULOCYTES NFR BLD AUTO: 0.4 % (ref 0–0.9)
LDLC SERPL CALC-MCNC: 102 MG/DL
LYMPHOCYTES # BLD AUTO: 2.18 K/UL (ref 1–4.8)
LYMPHOCYTES NFR BLD: 19.6 % (ref 22–41)
MCH RBC QN AUTO: 32.7 PG (ref 27–33)
MCHC RBC AUTO-ENTMCNC: 34.1 G/DL (ref 32.3–36.5)
MCV RBC AUTO: 96 FL (ref 81.4–97.8)
MONOCYTES # BLD AUTO: 1.45 K/UL (ref 0–0.85)
MONOCYTES NFR BLD AUTO: 13 % (ref 0–13.4)
NEUTROPHILS # BLD AUTO: 7.16 K/UL (ref 1.82–7.42)
NEUTROPHILS NFR BLD: 64.4 % (ref 44–72)
NRBC # BLD AUTO: 0 K/UL
NRBC BLD-RTO: 0 /100 WBC (ref 0–0.2)
PLATELET # BLD AUTO: 367 K/UL (ref 164–446)
PMV BLD AUTO: 9 FL (ref 9–12.9)
POTASSIUM SERPL-SCNC: 4 MMOL/L (ref 3.6–5.5)
PROT SERPL-MCNC: 6.8 G/DL (ref 6–8.2)
RBC # BLD AUTO: 5.26 M/UL (ref 4.7–6.1)
SODIUM SERPL-SCNC: 140 MMOL/L (ref 135–145)
TRIGL SERPL-MCNC: 211 MG/DL (ref 0–149)
WBC # BLD AUTO: 11.1 K/UL (ref 4.8–10.8)

## 2025-07-16 PROCEDURE — 80061 LIPID PANEL: CPT

## 2025-07-16 PROCEDURE — 97162 PT EVAL MOD COMPLEX 30 MIN: CPT

## 2025-07-16 PROCEDURE — 99497 ADVNCD CARE PLAN 30 MIN: CPT | Performed by: STUDENT IN AN ORGANIZED HEALTH CARE EDUCATION/TRAINING PROGRAM

## 2025-07-16 PROCEDURE — 700111 HCHG RX REV CODE 636 W/ 250 OVERRIDE (IP): Mod: JZ | Performed by: INTERNAL MEDICINE

## 2025-07-16 PROCEDURE — A9270 NON-COVERED ITEM OR SERVICE: HCPCS | Performed by: NURSE PRACTITIONER

## 2025-07-16 PROCEDURE — 700102 HCHG RX REV CODE 250 W/ 637 OVERRIDE(OP): Performed by: INTERNAL MEDICINE

## 2025-07-16 PROCEDURE — A9270 NON-COVERED ITEM OR SERVICE: HCPCS | Performed by: INTERNAL MEDICINE

## 2025-07-16 PROCEDURE — 770020 HCHG ROOM/CARE - TELE (206)

## 2025-07-16 PROCEDURE — 99233 SBSQ HOSP IP/OBS HIGH 50: CPT | Mod: 25 | Performed by: STUDENT IN AN ORGANIZED HEALTH CARE EDUCATION/TRAINING PROGRAM

## 2025-07-16 PROCEDURE — 85025 COMPLETE CBC W/AUTO DIFF WBC: CPT

## 2025-07-16 PROCEDURE — 700102 HCHG RX REV CODE 250 W/ 637 OVERRIDE(OP): Performed by: NURSE PRACTITIONER

## 2025-07-16 PROCEDURE — 97602 WOUND(S) CARE NON-SELECTIVE: CPT

## 2025-07-16 PROCEDURE — A9270 NON-COVERED ITEM OR SERVICE: HCPCS | Performed by: STUDENT IN AN ORGANIZED HEALTH CARE EDUCATION/TRAINING PROGRAM

## 2025-07-16 PROCEDURE — 36415 COLL VENOUS BLD VENIPUNCTURE: CPT

## 2025-07-16 PROCEDURE — 92610 EVALUATE SWALLOWING FUNCTION: CPT

## 2025-07-16 PROCEDURE — 80053 COMPREHEN METABOLIC PANEL: CPT

## 2025-07-16 PROCEDURE — 97166 OT EVAL MOD COMPLEX 45 MIN: CPT

## 2025-07-16 PROCEDURE — 700102 HCHG RX REV CODE 250 W/ 637 OVERRIDE(OP): Performed by: STUDENT IN AN ORGANIZED HEALTH CARE EDUCATION/TRAINING PROGRAM

## 2025-07-16 PROCEDURE — 82962 GLUCOSE BLOOD TEST: CPT | Performed by: STUDENT IN AN ORGANIZED HEALTH CARE EDUCATION/TRAINING PROGRAM

## 2025-07-16 PROCEDURE — 97535 SELF CARE MNGMENT TRAINING: CPT

## 2025-07-16 RX ORDER — CARBIDOPA AND LEVODOPA 25; 100 MG/1; MG/1
2 TABLET ORAL 3 TIMES DAILY
COMMUNITY

## 2025-07-16 RX ORDER — ALPRAZOLAM 0.5 MG
0.5 TABLET ORAL ONCE
Status: COMPLETED | OUTPATIENT
Start: 2025-07-16 | End: 2025-07-16

## 2025-07-16 RX ORDER — MICONAZOLE NITRATE 2 G/100G
CREAM TOPICAL 2 TIMES DAILY
Status: DISCONTINUED | OUTPATIENT
Start: 2025-07-16 | End: 2025-07-17 | Stop reason: HOSPADM

## 2025-07-16 RX ORDER — CHLORAL HYDRATE 500 MG
2000 CAPSULE ORAL 2 TIMES DAILY WITH MEALS
Status: DISCONTINUED | OUTPATIENT
Start: 2025-07-16 | End: 2025-07-17 | Stop reason: HOSPADM

## 2025-07-16 RX ORDER — DEXTROSE MONOHYDRATE 25 G/50ML
25 INJECTION, SOLUTION INTRAVENOUS
Status: DISCONTINUED | OUTPATIENT
Start: 2025-07-16 | End: 2025-07-17 | Stop reason: HOSPADM

## 2025-07-16 RX ORDER — PROPRANOLOL HYDROCHLORIDE 10 MG/1
5 TABLET ORAL 2 TIMES DAILY
COMMUNITY

## 2025-07-16 RX ORDER — INSULIN LISPRO 100 [IU]/ML
1-6 INJECTION, SOLUTION INTRAVENOUS; SUBCUTANEOUS
Status: DISCONTINUED | OUTPATIENT
Start: 2025-07-16 | End: 2025-07-17 | Stop reason: HOSPADM

## 2025-07-16 RX ORDER — CARBIDOPA AND LEVODOPA 25; 100 MG/1; MG/1
2 TABLET ORAL 3 TIMES DAILY
Status: DISCONTINUED | OUTPATIENT
Start: 2025-07-16 | End: 2025-07-17 | Stop reason: HOSPADM

## 2025-07-16 RX ORDER — EZETIMIBE 10 MG/1
10 TABLET ORAL DAILY
Status: DISCONTINUED | OUTPATIENT
Start: 2025-07-16 | End: 2025-07-17 | Stop reason: HOSPADM

## 2025-07-16 RX ORDER — PROPRANOLOL HYDROCHLORIDE 10 MG/1
5 TABLET ORAL 2 TIMES DAILY
Status: DISCONTINUED | OUTPATIENT
Start: 2025-07-16 | End: 2025-07-17 | Stop reason: HOSPADM

## 2025-07-16 RX ORDER — ALPRAZOLAM 0.5 MG
0.5 TABLET ORAL 2 TIMES DAILY
COMMUNITY

## 2025-07-16 RX ORDER — EZETIMIBE 10 MG/1
10 TABLET ORAL DAILY
COMMUNITY

## 2025-07-16 RX ORDER — ICOSAPENT ETHYL 1 G/1
2 CAPSULE ORAL 2 TIMES DAILY WITH MEALS
COMMUNITY

## 2025-07-16 RX ADMIN — CARBIDOPA AND LEVODOPA 2 TABLET: 25; 100 TABLET ORAL at 17:09

## 2025-07-16 RX ADMIN — OXYCODONE 5 MG: 5 TABLET ORAL at 05:02

## 2025-07-16 RX ADMIN — ENOXAPARIN SODIUM 40 MG: 100 INJECTION SUBCUTANEOUS at 17:07

## 2025-07-16 RX ADMIN — MICONAZOLE NITRATE: 2 CREAM TOPICAL at 21:39

## 2025-07-16 RX ADMIN — ATORVASTATIN CALCIUM 40 MG: 40 TABLET, FILM COATED ORAL at 17:08

## 2025-07-16 RX ADMIN — ALPRAZOLAM 0.5 MG: 0.5 TABLET ORAL at 23:31

## 2025-07-16 RX ADMIN — OXYCODONE 5 MG: 5 TABLET ORAL at 20:18

## 2025-07-16 RX ADMIN — ASPIRIN 81 MG: 81 TABLET, CHEWABLE ORAL at 05:01

## 2025-07-16 RX ADMIN — OMEGA-3 FATTY ACIDS CAP 1000 MG 2000 MG: 1000 CAP at 17:08

## 2025-07-16 RX ADMIN — PROPRANOLOL HYDROCHLORIDE 5 MG: 10 TABLET ORAL at 17:09

## 2025-07-16 RX ADMIN — OXYCODONE 5 MG: 5 TABLET ORAL at 17:13

## 2025-07-16 RX ADMIN — EZETIMIBE 10 MG: 10 TABLET ORAL at 17:09

## 2025-07-16 RX ADMIN — CARBIDOPA AND LEVODOPA 2 TABLET: 25; 100 TABLET ORAL at 21:39

## 2025-07-16 RX ADMIN — DOCUSATE SODIUM 50 MG AND SENNOSIDES 8.6 MG 2 TABLET: 8.6; 5 TABLET, FILM COATED ORAL at 17:10

## 2025-07-16 ASSESSMENT — PAIN DESCRIPTION - PAIN TYPE
TYPE: ACUTE PAIN

## 2025-07-16 ASSESSMENT — COGNITIVE AND FUNCTIONAL STATUS - GENERAL
HELP NEEDED FOR BATHING: A LITTLE
TOILETING: A LITTLE
WALKING IN HOSPITAL ROOM: A LITTLE
DRESSING REGULAR UPPER BODY CLOTHING: A LITTLE
DRESSING REGULAR LOWER BODY CLOTHING: A LITTLE
SUGGESTED CMS G CODE MODIFIER MOBILITY: CK
STANDING UP FROM CHAIR USING ARMS: A LITTLE
MOVING FROM LYING ON BACK TO SITTING ON SIDE OF FLAT BED: A LITTLE
TURNING FROM BACK TO SIDE WHILE IN FLAT BAD: A LITTLE
DAILY ACTIVITIY SCORE: 20
MOBILITY SCORE: 18
SUGGESTED CMS G CODE MODIFIER DAILY ACTIVITY: CJ
CLIMB 3 TO 5 STEPS WITH RAILING: A LITTLE
MOVING TO AND FROM BED TO CHAIR: A LITTLE

## 2025-07-16 ASSESSMENT — GAIT ASSESSMENTS
DEVIATION: BRADYKINETIC;DECREASED BASE OF SUPPORT;DECREASED HEEL STRIKE;DECREASED TOE OFF;OTHER (COMMENT)
DISTANCE (FEET): 40
GAIT LEVEL OF ASSIST: CONTACT GUARD ASSIST

## 2025-07-16 ASSESSMENT — FIBROSIS 4 INDEX: FIB4 SCORE: 1.23

## 2025-07-16 ASSESSMENT — ACTIVITIES OF DAILY LIVING (ADL): TOILETING: INDEPENDENT

## 2025-07-16 NOTE — PROGRESS NOTES
Medication history reviewed with historic review and PT's home pharmacy, [facility] in [place] ([phone]) over the phone.    Med rec is complete per historic review and [facility] reporting    Allergies reviewed with [facility] over the phone    Patient has no history of taking any outpatient antibiotics in the last 30 days. Ameya's confirmed    Patient has no history of taking any antimicrobials (antivirals, antifungals and antiparasitics) in the last 30 days. [facility] confirmed.    Patient has no history of taking anticoagulants. Ameya's confirmed    Preferred pharmacy for this encounter - [facility] ([phone])

## 2025-07-16 NOTE — THERAPY
Physical Therapy   Initial Evaluation     Patient Name:  Romario Chaudhari  Age:  75 y.o., Sex:  male  Medical Record #:  [id number]  Today's Date: 7/16/2025     Precautions  Medical: Fall Risk  Swallowing: Swallow Precautions    Assessment  Patient is 75 y.o. male presented 7/15/2025 with complaints of left-sided weakness, aphasia     Currently been managed for Acute CVA    MRI Brain: Pending     PMH: type 2 diabetes, Parkinson disease, hypertension, CAD, CABG, GI bleed      Patient seen for PT evaluation. Patient in bed, agreeable for the session. Able to demonstrate functional mobility tasks as detailed below. Currently appears to be below baseline level of functional mobility. Will continue to benefit from PT services to help improve overall functional mobility. Anticipate no PT needs upon DC.     Plan    Physical Therapy Initial Treatment Plan   Treatment Plan : Bed Mobility, Equipment, Family / Caregiver Training, Gait Training, Neuro Re-Education / Balance, Therapeutic Activities, Therapeutic Exercise  Treatment Frequency: 2 Times per Week  Duration: Until Therapy Goals Met    DC Equipment Recommendations: None (Owns FWW, SPC)  Discharge Recommendations: Anticipate that the patient will have no further physical therapy needs after discharge from the hospital    Objective     07/16/25 0840   Time In/Time Out   Therapy Start Time 0816   Therapy End Time 0840   Total Therapy Time 24   Initial Contact Note    Initial Contact Note Order Received and Verified, Physical Therapy Evaluation in Progress with Full Report to Follow.   Precautions   Medical Fall Risk   Swallowing Swallow Precautions   Vitals   Pulse 61   Patient BP Position Sitting  (EOB)   Blood Pressure  (!) 153/113   Pulse Oximetry 100 %   O2 Delivery Device None - Room Air   Vitals Comments Post activity, in semi-fowlers: /101, HR 59, SpO2 97. Nursing updated regarding elevated BP   Pain   Pain Scales 0 to 10 Scale    Intervention Rest   Pain 0 - 10  Group   Location Generalized   Therapist Pain Assessment Prior to Activity;During Activity;Post Activity;Post Activity Pain Same as Prior to Activity   Prior Living Situation   Prior Services None   Housing / Facility 1 Story House   Steps Into Home 0   Steps In Home 0   Equipment Owned Front-Wheel Walker;Single Point Cane   Lives with - Patient's Self Care Capacity Spouse   Comments Patient mentioned that his wife is able to assist him if needed, he does not need assistance for any task at baseline. Patient lives in [place], [place].   Prior Level of Functional Mobility   Bed Mobility Independent   Transfer Status Independent   Ambulation Independent   Ambulation Distance Limited community   Assistive Devices Used Front-Wheel Walker   Comments Per patient, he uses SPC indoors and FWW outdoors. Patient retired from [employer], he also worked as Paramedic for about 10 years. Per patient, he mostly stays home, very limited mobility, mostly spends time on his couch.   History of Falls   History of Falls No   Cognition    Cognition / Consciousness X   Speech/ Communication Hard of Hearing  (B/L hearing aids)   Orientation Level Oriented x 4   Level of Consciousness Alert   New Learning Impaired   Passive ROM Lower Body   Passive ROM Lower Body X   Comments B/L knee extension-mild impairment   Active ROM Lower Body    Active ROM Lower Body  X   Comments B/L hip flexion-mildly impaired, B/L knee extension-mildly impaired   Strength Lower Body   Lower Body Strength  X   Comments Grossly B/L hip flexor 3/5, B/L knee extensor/flexor 3+/5, B/L ankle DF/PF 4/5, B/L hip abductor/adductor 4/5   Sensation Lower Body   Lower Extremity Sensation   X   Comments Patient denies any numbness, however reports of chronic pain in both his foot   Lower Body Muscle Tone   Lower Body Muscle Tone  WDL   Neurological Concerns   Neurological Concerns Yes   Comments Generalized tremors on RUE noticed during ambulation   Coordination Lower Body     Comments B/L heel to shin, B/L ankle movements-generalized slow speed   Vision   Vision Comments Glasses-all the time; Smooth pursuit, peripheral vision-WNL   Other Treatments   Other Treatments Provided Patient educated on stroke, symptoms of stroke, risk factors, importance to call 911 sooner than later. Reinforced importance of daily & frequent mobility, OOB to chair for meals, ambulate with supervision from nursing staff. Discussed about DC plan, patient reports that he feels that he is at baseline mobility, denies need for any more PT services. Assisted with set-up of breakfast tray towards end of session   Balance Assessment   Sitting Balance (Static) Fair   Sitting Balance (Dynamic) Fair   Standing Balance (Static) Fair   Standing Balance (Dynamic) Fair -   Weight Shift Sitting Good   Weight Shift Standing Fair   Comments Seated EOB; Standing W/O AD-Refused to use any AD at this time   Bed Mobility    Supine to Sit Standby Assist   Sit to Supine Supervised   Scooting Supervised   Rolling Supervised   Comments HOB flat, use of bed rail, towards R side   Gait Analysis   Gait Level Of Assist Contact Guard Assist   Assistive Device None   Distance (Feet) 40   Deviation Bradykinetic;Decreased Base Of Support;Decreased Heel Strike;Decreased Toe Off;Other (Comment)  (Slight forward trunk lean)   # of Stairs Climbed   (Does not have to do stairs at home)   Comments Distance of ambulation deferred by patient; denied using any AD at this time; slow cautious pace; cues for directions   Functional Mobility   Sit to Stand Contact Guard Assist   Bed, Chair, Wheelchair Transfer Refused   Mobility Bed-EOB sitting-sit-stand-ambulate in the room-EOB-bed-HOB raised   Comments W/O AD; cues for hand placement, LE placement   6 Clicks Assessment - How much HELP from from another person do you currently need... (If the patient hasn't done an activity recently, how much help from another person do you think he/she would need if  he/she tried?)   Turning from your back to your side while in a flat bed without using bedrails? 3   Moving from lying on your back to sitting on the side of a flat bed without using bedrails? 3   Moving to and from a bed to a chair (including a wheelchair)? 3   Standing up from a chair using your arms (e.g., wheelchair, or bedside chair)? 3   Walking in hospital room? 3   Climbing 3-5 steps with a railing? 3   6 clicks Mobility Score 18   Patient / Family Goals    Patient / Family Goal #1 To return to prior level of functional mobility   Short Term Goals    Short Term Goal # 1 Patient will perform supine-sit, sit-supine with HOB flat without rails with supervision in 6 visits to safely get in & out of bed   Short Term Goal # 2 Patient will perform sit-stand with LRAD with supervision in 6 visits to progress with functional mobility   Short Term Goal # 3 Patient will perform chair transfers with LRAD with supervision in 6 visits to safely get OOB to chair   Short Term Goal # 4 Patient will ambulate 150 feet with LRAD with supervision in 6 visits to safely ambulate household & limited community distance   Education Group   Education Provided Role of Physical Therapist   Role of Physical Therapist Patient Response Patient;Acceptance;Explanation;Verbal Demonstration   Physical Therapy Initial Treatment Plan    Treatment Plan  Bed Mobility;Equipment;Family / Caregiver Training;Gait Training;Neuro Re-Education / Balance;Therapeutic Activities;Therapeutic Exercise   Treatment Frequency 2 Times per Week   Duration Until Therapy Goals Met   Problem List    Problems Impaired Bed Mobility;Impaired Transfers;Impaired Ambulation;Functional Strength Deficit;Impaired Balance;Decreased Activity Tolerance   Anticipated Discharge Equipment and Recommendations   DC Equipment Recommendations None  (Owns FWW, SPC)   Discharge Recommendations Anticipate that the patient will have no further physical therapy needs after discharge from  the hospital   Interdisciplinary Plan of Care Collaboration   IDT Collaboration with  Nursing;Occupational Therapist;Certified Nursing Assistant   Patient Position at End of Therapy Call Light within Reach;Tray Table within Reach;In Bed;Bed Alarm On   Session Information   Date / Session Number  7/16-1(1/2, 7/22)

## 2025-07-16 NOTE — WOUND TEAM
[facility]  Inpatient Services  Wound and Skin Care Brief Evaluation    Admission Date: 7/15/2025     Last order of IP CONSULT TO WOUND CARE was found on 7/16/2025 from Hospital Encounter on 7/15/2025     HPI, PMH, SH: Reviewed    Chief Complaint   Patient presents with    Unilateral Weakness     L sided. LKW: 1130      Diagnosis: Acute CVA (cerebrovascular accident) (HCC) [I63.9]    Unit where seen by Wound Team: T823/01     Wound consult placed regarding perineal area/buttocks. Chart and images reviewed. This discussed with bedside RN. This clinician in to assess patient. Patient pleasant and agreeable.     No pressure injuries or advanced wound care needs identified. Wound consult completed. No further follow up unless indicated and consulted.     Wound 07/15/25 Irritant Contact Perspiration Perineum;Coccyx Moisture associated excoriation (Active)   Date First Assessed/Time First Assessed: 07/15/25 2130   Present on Original Admission: Yes  Primary Wound Type: (c) Irritant Contact  Secondary Wound Type - Irritant Contact Dermatitis: Perspiration  Location: Perineum;Coccyx  Wound Description (Comm...      Assessments 7/16/2025 12:30 PM   Wound Image     Site Assessment Pink   Periwound Assessment Intact   Margins Attached edges   Closure None   Drainage Amount None   Periwound Protectant Antifungal Therapy   Dressing Status Open to Air   NEXT Weekly Photo (Inpatient Only) 07/23/25       Area Assessed:  Bilateral heels  Area intact             PREVENTATIVE INTERVENTIONS:    Q shift Barney - performed per nursing policy  Q shift pressure point assessments - performed per nursing policy    Surface/Positioning  Standard/trauma mattress - Currently in Place  Reposition q 2 hours with pillows - Ordered for nursing to apply    Offloading/Redistribution  Heel offloading dressing (Silicone dressing) - Ordered for nursing to apply  Float Heels off Bed with Pillows - Ordered for nursing to apply

## 2025-07-16 NOTE — ED NOTES
Med Rec partial per patient   Allergies reviewed  Anti-MICROBIAL (antivirals/antibiotics/antifungal) in past 30 days: no  Anticoagulant in past 14 days: no  Pharmacy patient utilizes:[facility] in [place]    Patient unable to verify name of Parkinson medication he takes. Patient unsure if takes Zetia (per reconcile outside review). Patient states he did not take any medications this morning.   Pharmacy closed at this time.

## 2025-07-16 NOTE — ASSESSMENT & PLAN NOTE
75-year-old male with history of Parkinson disease, hypertension, dyslipidemia, who presented with left-sided weakness since 11:30 AM, with significantly improved.  Currently he has subjective left hand  only and awkwardness with movements in the left hand.  He is outside therapeutic window for tPA.  CTA head and neck showed no large vessel occlusion  A1c 6.1,     Plan: Admit to telemetry to observe for possible A-fib, neurochecks every 4 hours  MRI of the brain without contrast, transthoracic echo  Aspirin, Lipitor, ezetimibe  PT OT and speech evaluation  Consider neurology consult depending on results of MRI of the brain

## 2025-07-16 NOTE — PROGRESS NOTES
Per [employer] these are the make and serial numbers for spine stimulator:   [id number]  [id number]

## 2025-07-16 NOTE — DISCHARGE PLANNING
[facility] Transitional [facility] Coordination    Referral from: Dr. Houser    Insurance Provider on Facesheet: [facility]    Potential Rehab Diagnosis: CVA    Chart review indicates patient may have on going medical management and may have therapy needs to possibly meet inpatient rehab facility criteria with the goal of returning to community.    D/C support will need to be verified: Spouse    Physiatry consultation pended per protocol.  W/U & TX pending.     Thank you for the referral.

## 2025-07-16 NOTE — PROGRESS NOTES
Hospital Medicine Daily Progress Note    Date of Service  7/16/2025    Chief Complaint  Romaroi Chaudhari is a 75 y.o. male admitted 7/15/2025 with L sided weakness     Hospital Course  75 y.o. male type 2 diabetes, Parkinson disease, hypertension, CAD s/p CABG, hx of GI bleed who presented 7/15/2025 with complaints of left-sided weakness since 11:30 AM, expressive aphasia.  By the time of arrival to ER symptoms improved, aphasia resolved and patient only has residual left hand  mild weakness.  NIH score 0.   CTA head neck negative.   MRI pending   A1c 6.1,     Interval Problem Update  -Notes were extensively reviewed from primary team, radiology, ED, surgery, specialists, etc.   -Reviewed labs to date, microbiology for current admit and prior  -Imaging was independently reviewed and interpreted    Seen patient and family at bedside  Reports L sided numbness  Expressive aphasia resolved  MRI and echo pending  Continue aspirin and atorvastatin  PT/OT/SLP  I have reviewed chest x-ray, no acute intrathoracic abnormalities  Patient has spinal stimulator, which needs to be verified for MRI    I have discussed this patient's plan of care and discharge plan at IDT rounds today with Case Management, Nursing, Nursing leadership, and other members of the IDT team.    Consultants/Specialty  na    Code Status  Full Code    Disposition  The patient is not medically cleared for discharge to home or a post-acute facility.      I have placed the appropriate orders for post-discharge needs.    Review of Systems  ROS     Physical Exam  Temp:  [36.3 °C (97.3 °F)-37 °C (98.6 °F)] 36.6 °C (97.9 °F)  Pulse:  [56-90] 65  Resp:  [12-18] 16  BP: (127-173)/() 146/95  SpO2:  [90 %-100 %] 94 %    Physical Exam    Fluids    Intake/Output Summary (Last 24 hours) at 7/16/2025 1550  Last data filed at 7/16/2025 1020  Gross per 24 hour   Intake 1040 ml   Output 475 ml   Net 565 ml        Laboratory  Recent Labs     07/15/25  3088  07/16/25  0140   WBC 8.6 11.1*   RBC 5.33 5.26   HEMOGLOBIN 17.5 17.2   HEMATOCRIT 51.1 50.5   MCV 95.9 96.0   MCH 32.8 32.7   MCHC 34.2 34.1   RDW 53.6* 54.3*   PLATELETCT 290 367   MPV 8.8* 9.0     Recent Labs     07/15/25  1852 07/16/25  0140   SODIUM 139 140   POTASSIUM 4.2 4.0   CHLORIDE 106 106   CO2 19* 21   GLUCOSE 109* 106*   BUN 15 16   CREATININE 0.91 0.93   CALCIUM 9.1 9.0     Recent Labs     07/15/25  1747   APTT 26.3   INR 1.18*         Recent Labs     07/16/25  0140   TRIGLYCERIDE 211*   HDL 46   *       Imaging  DX-CHEST-PORTABLE (1 VIEW)   Final Result         No acute cardiac or pulmonary abnormality is identified.      CT-CTA NECK WITH & W/O-POST PROCESSING   Final Result      CT angiogram of the neck within normal limits.      CT-CTA HEAD WITH & W/O-POST PROCESS   Final Result      Atherosclerosis without significant stenosis, occlusion, or aneurysm.      CT-CEREBRAL PERFUSION ANALYSIS   Final Result      1. Cerebral blood flow less than 30% possibly representing completed infarct = 0 mL. Based on distribution of this finding, this is unlikely to represent artifact.      2. T Max more than 6 seconds possibly representing combination of completed infarct and ischemia = 3 mL. Based on the distribution of this finding, this is possibly artifact.      3. Mismatched volume possibly representing ischemic brain/penumbra= 3 mL      4.  Please note that this cerebral perfusion study and report is Quantitative and targets supratentorial (cerebral) perfusion for evaluation of large vessel territory acute ischemia/infarction. For example, lacunar infarcts, and brainstem/posterior fossa    ischemia/infarction are not evaluated on this study.  Data acquisition is subject to artifacts which can yield non-anatomically plausible perfusion maps which may be due to motion, bolus timing, signal to noise ratio, or other technical factors.    Perfusion map abnormalities which show non-anatomic distributions are  "likely artifact.   This study is not \"stand-alone\" and should only be utilized for diagnosis, management/treatment in correlation with CT, CTA, and/or MRI and clinical factors.         MR-BRAIN-W/O    (Results Pending)   EC-ECHOCARDIOGRAM COMPLETE W/O CONT    (Results Pending)        Assessment/Plan  * Acute CVA (cerebrovascular accident) (HCC)- (present on admission)  Assessment & Plan  75-year-old male with history of Parkinson disease, hypertension, dyslipidemia, who presented with left-sided weakness since 11:30 AM, with significantly improved.  Currently he has subjective left hand  only and awkwardness with movements in the left hand.  He is outside therapeutic window for tPA.  CTA head and neck showed no large vessel occlusion  A1c 6.1,     Plan: Admit to telemetry to observe for possible A-fib, neurochecks every 4 hours  MRI of the brain without contrast, transthoracic echo  Aspirin, Lipitor, ezetimibe  PT OT and speech evaluation  Consider neurology consult depending on results of MRI of the brain      Spinal cord stimulator status  Assessment & Plan  In place    Parkinson disease (HCC)  Assessment & Plan  Continue sinemet    Hx of CABG- (present on admission)  Assessment & Plan  Continue aspirin, Lipitor    Type 2 diabetes mellitus without complication, without long-term current use of insulin (HCC)- (present on admission)  Assessment & Plan  A1c 6.1.  Patient has been on metformin at home    Essential hypertension- (present on admission)  Assessment & Plan  He is on propranolol, will continue   Continue monitor blood pressure.  Long-term SBP goal 100-130    Advance care planning  Assessment & Plan  75 y.o. male type 2 diabetes, Parkinson disease, hypertension, CAD s/p CABG, hx of GI bleed who presented 7/15/2025 with complaints of left-sided weakness and expressive aphasia concerning acute CVA. Discussed goal of care and code status with patient. He has medical capacity. He confirms full code. " ACP 16 mins         VTE prophylaxis: lovenox    I have performed a physical exam and reviewed and updated ROS and Plan today (7/16/2025). In review of yesterday's note (7/15/2025), there are no changes except as documented above.    Patient is has a high medical complexity, complex decision making and is at high risk for complication, morbidity, and mortality.  I spent 68 minutes, reviewing the chart, obtaining and/or reviewing separately obtained history. Performing a medically appropriate examination and evaluation.  Counseling and educating the patient. Ordering and reviewing medications, tests, or procedures. Documenting clinical information in EPIC. Independently interpreting results and communicating results to patient. Discussing future disposition of care with patient, RN and case management.  This does not include time spent on separately billable procedures/tests.

## 2025-07-16 NOTE — THERAPY
"Speech Language Pathology   Clinical Swallow Evaluation     Patient Name:  Romario Chaudhari  AGE:  75 y.o., SEX:  male  Medical Record #:  [id number]  Date of Service:  7/16/2025    Precautions  Medical: Fall Risk  Swallowing: Swallow Precautions    History of Present Illness  Patient is a 75 y.o. male who presented on 7/15 with complaints of left-sided weakness since 11:30 AM, expressive aphasia.  By the time of arrival to ER symptoms improved, aphasia resolved and patient only has residual left hand  mild weakness.    PMHx:  type 2 diabetes, Parkinson disease, hypertension, CAD, CABG, active GI bleed    CXR:  \"No acute cardiac or pulmonary abnormality is identified.\"    CT-CTA Head:   \"Atherosclerosis without significant stenosis, occlusion, or aneurysm.\"      General Information:  Vitals  O2 Delivery Device: None - Room Air  Level of Consciousness: Alert, Awake  Orientation: Oriented x 4  Follows Directives: Yes      Prior Living Situation & Level of Function:  Prior Services: None  Housing / Facility: [address]  Lives with - Patient's Self Care Capacity: Spouse  Swallowing: WFL       Oral Mechanism Evaluation:  Dentition: Natural dentition   Facial Symmetry: Equal  Facial Sensation: Equal     Labial Observations: WFL   Lingual Observations: Midline         Laryngeal Function:  Secretion Management: Adequate  Voice Quality: WFL   Cough: Perceptually WNL       Subjective  Patient received awake, sitting upright in bed, spouse at bedside.  Pt denied history of dysphagia. Pt agreeable but somewhat annoyed with SLP evaluation; stated \"I'm fine.\"       Assessment  Current Method of Nutrition: Oral diet (Regular solids/thin liquids)  Positioning: Gao's (60-90 degrees)  Bolus Administration: Patient    O2 Delivery Device: None - Room Air  Factor(s) Affecting Performance: None       Swallowing Trials:  Swallowing Trials  Thin Liquid (TN0): WFL  Regular (RG7): WFL      Comments: Patient adequately self-feeding with " "appropriate rate and volume of intake. Intact oral bolus acceptance/containment. Adequate bite with functional mastication of solids. No notable oral bolus residue. No cough/throat clear, vocal wetness, or increased WOB appreciated throughout oral intake. Single swallow completed per bolus. No signs of esophageal dysfunction. Provided education regarding general aspiration precautions as well as signs of aspiration.     Pt denied ever seeing a speech therapist; stated \"I don't know why I would need to.\" Education provided regarding increased risk for dysphagia and voice deficits 2/2 Parkinson's disease. Discussed recommendation to reach out to primary doctor for an outpatient speech consult should deficits occur. Spouse stated understanding.       Clinical Impressions  Patient presents with a functional swallow. No overt s/sx of aspiration this date. Recommend continuation of oral diet. Further acute speech therapy intervention for swallowing is not warranted at this time; please re-consult with any changes.      Recommendations  Diet Consistency: Regular solids/thin liquids  Instrumentation: None indicated at this time  Medication: As tolerated  Supervision: Independent  Positioning: Fully upright and midline during oral intake  Risk Management : Small bites/sips, Slow rate of intake  Oral Care: BID         SLP Treatment Plan  Treatment Plan: None Indicated  SLP Frequency: N/A - Evaluation Only         Anticipated Discharge Needs  Discharge Recommendations: Anticipate that the patient will have no further speech therapy needs after discharge from the hospital   Therapy Recommendations Upon DC: Not Indicated          ELISABET Posey   "

## 2025-07-16 NOTE — H&P
Hospital Medicine History & Physical Note    Date of Service  7/15/2025    Primary Care Physician  Pcp Pt States None        Code Status  Full Code    Chief Complaint  Chief Complaint   Patient presents with    Unilateral Weakness     L sided. LKW: 1130        History of Presenting Illness  Romario Chaudhari is a 75 y.o. male type 2 diabetes, Parkinson disease, hypertension, CAD, CABG, active GI bleed who presented 7/15/2025 with complaints of left-sided weakness since 11:30 AM, expressive aphasia.  By the time of arrival to ER symptoms improved, aphasia resolved and patient only has residual left hand  mild weakness.  NIH score 0.  Neurology was not called since patient is outside therapeutic window, there is no large vessel occlusion on CTA of the head and neck and his deficits largely resolved.  EKG showed sinus rhythm, RBBB, AV block first-degree, no significant changes from 2020.  Patient has been taking baby aspirin.  Unable to verify Parkinson medications.      I discussed the plan of care with patient, bedside RN, and ERP.    Review of Systems  Review of Systems   Constitutional:  Negative for chills, fever and weight loss.   HENT:  Negative for ear pain, hearing loss and tinnitus.    Eyes:  Negative for blurred vision, double vision and photophobia.   Respiratory:  Negative for cough, hemoptysis and sputum production.    Cardiovascular:  Negative for chest pain, palpitations and orthopnea.   Gastrointestinal:  Negative for heartburn, nausea and vomiting.   Genitourinary:  Negative for dysuria, flank pain, frequency and hematuria.   Musculoskeletal:  Negative for back pain and neck pain.   Skin:  Negative for itching and rash.   Neurological:  Positive for weakness. Negative for tremors, speech change, focal weakness and headaches.   Endo/Heme/Allergies:  Negative for environmental allergies and polydipsia. Does not bruise/bleed easily.   Psychiatric/Behavioral:  Negative for hallucinations and substance  abuse. The patient is not nervous/anxious.        Past Medical History   has a past medical history of Diabetes (HCC), Hypertension, and Upper GI bleed (2007).    Surgical History   has a past surgical history that includes cervical laminectomy posterior; lumbar laminectomy diskectomy; carpal tunnel release (Bilateral); nerve ulnar transfer (Bilateral); multiple coronary artery bypass endo vein harvest (11/21/2018); syd (11/21/2018); achilles tendon repair; pr upper gi endoscopy,biopsy (N/A, 7/12/2020); and colonoscopy with polyp (N/A, 7/12/2020).     Family History  Family History   Problem Relation Age of Onset    Heart Attack Father 86    Dementia Father     Dementia Mother         Family history reviewed with patient. There is no family history that is pertinent to the chief complaint.     Social History   reports that he has never smoked. He has never used smokeless tobacco. He reports current alcohol use. He reports that he does not use drugs.    Allergies  Allergies[1]    Medications  Prior to Admission Medications   Prescriptions Last Dose Informant Patient Reported? Taking?   NON SPECIFIED 7/14/2025 Evening Patient Yes Yes   Sig: Take 2 Tablets by mouth 3 times a day. Unknown Parkinson Medication   aspirin 81 MG EC tablet 7/14/2025 Patient Yes Yes   Sig: Take 81 mg by mouth every day.   metFORMIN (GLUCOPHAGE) 500 MG Tab 7/14/2025 Evening Patient Yes Yes   Sig: Take 500 mg by mouth 2 times a day.      Facility-Administered Medications: None       Physical Exam  Pulse:  [66-90] 90  Resp:  [12-18] 18  BP: (154-173)/(89-94) 157/93  SpO2:  [90 %-94 %] 90 %  Blood Pressure : (!) 157/93       Pulse: 90   Respiration: 18   Pulse Oximetry: 90 %       Physical Exam  Vitals and nursing note reviewed.   Constitutional:       General: He is not in acute distress.     Appearance: Normal appearance.   HENT:      Head: Normocephalic and atraumatic.      Nose: Nose normal.      Mouth/Throat:      Mouth: Mucous membranes are  "moist.   Eyes:      Extraocular Movements: Extraocular movements intact.      Pupils: Pupils are equal, round, and reactive to light.   Cardiovascular:      Rate and Rhythm: Normal rate and regular rhythm.   Pulmonary:      Effort: Pulmonary effort is normal.      Breath sounds: Normal breath sounds.   Abdominal:      General: Abdomen is flat. There is no distension.      Tenderness: There is no abdominal tenderness.   Musculoskeletal:         General: No swelling or deformity. Normal range of motion.      Cervical back: Normal range of motion and neck supple.   Skin:     General: Skin is warm and dry.   Neurological:      General: No focal deficit present.      Mental Status: He is alert and oriented to person, place, and time.      Comments: Occasional resting tremor, bradykinesia   Psychiatric:         Mood and Affect: Mood normal.         Behavior: Behavior normal.         Laboratory:  Recent Labs     07/15/25  1747   WBC 8.6   RBC 5.33   HEMOGLOBIN 17.5   HEMATOCRIT 51.1   MCV 95.9   MCH 32.8   MCHC 34.2   RDW 53.6*   PLATELETCT 290   MPV 8.8*     Recent Labs     07/15/25  1852   SODIUM 139   POTASSIUM 4.2   CHLORIDE 106   CO2 19*   GLUCOSE 109*   BUN 15   CREATININE 0.91   CALCIUM 9.1     Recent Labs     07/15/25  1852   ALTSGPT <5   ASTSGOT 18   ALKPHOSPHAT 76   TBILIRUBIN 1.1   GLUCOSE 109*     Recent Labs     07/15/25  1747   APTT 26.3   INR 1.18*     No results for input(s): \"NTPROBNP\" in the last 72 hours.      Recent Labs     07/15/25  1852   TROPONINT 18       Imaging:  DX-CHEST-PORTABLE (1 VIEW)   Final Result         No acute cardiac or pulmonary abnormality is identified.      CT-CTA NECK WITH & W/O-POST PROCESSING   Final Result      CT angiogram of the neck within normal limits.      CT-CTA HEAD WITH & W/O-POST PROCESS   Final Result      Atherosclerosis without significant stenosis, occlusion, or aneurysm.      CT-CEREBRAL PERFUSION ANALYSIS   Final Result      1. Cerebral blood flow less than 30% " "possibly representing completed infarct = 0 mL. Based on distribution of this finding, this is unlikely to represent artifact.      2. T Max more than 6 seconds possibly representing combination of completed infarct and ischemia = 3 mL. Based on the distribution of this finding, this is possibly artifact.      3. Mismatched volume possibly representing ischemic brain/penumbra= 3 mL      4.  Please note that this cerebral perfusion study and report is Quantitative and targets supratentorial (cerebral) perfusion for evaluation of large vessel territory acute ischemia/infarction. For example, lacunar infarcts, and brainstem/posterior fossa    ischemia/infarction are not evaluated on this study.  Data acquisition is subject to artifacts which can yield non-anatomically plausible perfusion maps which may be due to motion, bolus timing, signal to noise ratio, or other technical factors.    Perfusion map abnormalities which show non-anatomic distributions are likely artifact.   This study is not \"stand-alone\" and should only be utilized for diagnosis, management/treatment in correlation with CT, CTA, and/or MRI and clinical factors.         MR-BRAIN-W/O    (Results Pending)   EC-ECHOCARDIOGRAM COMPLETE W/O CONT    (Results Pending)           Assessment/Plan:  Justification for Admission Status  I anticipate this patient will require at least two midnights for appropriate medical management, necessitating inpatient admission because acute CVA    Patient will need a Telemetry bed on MEDICAL service .  The need is secondary to acute CVA.    * Acute CVA (cerebrovascular accident) (HCC)- (present on admission)  Assessment & Plan  75-year-old male with history of Parkinson disease, hypertension, dyslipidemia, who presented with left-sided weakness since 11:30 AM, with significantly improved.  Currently he has subjective left hand  only and awkwardness with movements in the left hand.  He is outside therapeutic window for " tPA.  CTA head and neck showed no large vessel occlusion  Plan: Admit to telemetry to observe for possible A-fib, neurochecks every 4 hours  MRI of the brain without contrast, transthoracic echo  Aspirin, Lipitor  PT OT and speech evaluation  Consider neurology consult depending on results of MRI of the brain      Parkinson disease (HCC)  Assessment & Plan  Resume antiparkinsonian medications once verified by pharmacy.    Hx of CABG- (present on admission)  Assessment & Plan  Continue aspirin, Lipitor    Type 2 diabetes mellitus without complication, without long-term current use of insulin ([facility])- (present on admission)  Assessment & Plan  A1c 6.1.  Patient has been on metformin at home    Essential hypertension- (present on admission)  Assessment & Plan  Blood pressure 157/93  Consider starting blood pressure medications  Monitor        VTE prophylaxis: enoxaparin ppx         [1] No Known Allergies

## 2025-07-16 NOTE — ASSESSMENT & PLAN NOTE
75 y.o. male type 2 diabetes, Parkinson disease, hypertension, CAD s/p CABG, hx of GI bleed who presented 7/15/2025 with complaints of left-sided weakness and expressive aphasia concerning acute CVA. Discussed goal of care and code status with patient. He has medical capacity. He confirms full code. ACP 16 mins

## 2025-07-16 NOTE — CARE PLAN
The patient is Stable - Low risk of patient condition declining or worsening    Shift Goals  Clinical Goals: Monitor Neuro Status, NIHSS  Patient Goals: Eat Dinner  Family Goals: Updates, Comfort    Progress made toward(s) clinical / shift goals:    Problem: Neuro Status  Goal: Neuro status will remain stable or improve  Outcome: Progressing  Q4 hour neuro checks in place. Patient's neurological status (A/O x 4) remains stable and unchanged throughout shift. Patient is able to move all extremities, but still reports weakness to left hand when gripping objects. NIHSS performed per protocol and MD orders. Bed locked and in lowest position. Call light and personal belongings within reach.      Problem: Dysphagia  Goal: Dysphagia will improve  Outcome: Progressing  [place] Swallow Dysphagia Screening performed at bedside. Patient passed screening. Appropriate diet orders placed.      Problem: Risk for Aspiration  Goal: Patient's risk for aspiration will be absent or decrease  Outcome: Progressing  Patient alert, upright, and midline for oral intake. No signs/symptoms of aspiration. Patient denies difficulty swallowing or chewing. Suction available and within reach.     Problem: Knowledge Deficit - Standard  Goal: Patient and family/care givers will demonstrate understanding of plan of care, disease process/condition, diagnostic tests and medications  Outcome: Progressing  Patient and family present at bedside updated on plan of care. Patient and family aware of MRI and Echo pending. MRI screening completed with patient. All questions and concerns addressed at this time.     Problem: Fall Risk  Goal: Patient will remain free from falls  Outcome: Progressing  Patient calls appropriately when requiring assistance from staff. Bed alarm in place, frame alarm in place. Treaded socks in use when ambulating. Bed locked and in lowest position. Call light and personal belongings within reach.        Patient is not progressing  towards the following goals:

## 2025-07-16 NOTE — ED NOTES
Lab called and report that the green, lavender and blue top had clotted and need to be recollected. Recollected and sent.

## 2025-07-16 NOTE — PROGRESS NOTES
Monitor Summary: SB/SR 55-69, NE .20, QRS .16, QT .45, with a BBB per strip from monitor room.

## 2025-07-16 NOTE — PROGRESS NOTES
4 Eyes Skin Assessment Completed by SANKET Roca and SANKET Penny.    Skin assessment is primarily focused on high risk bony prominences. Pay special attention to skin beneath and around medical devices, high risk bony prominences, skin to skin areas and areas where the patient lacks sensation to feel pain and areas where the patient previously had breakdown.     Head (Occipital):  Scar   Ears (Under Medical Devices): WDL   Nose (Under Medical Devices): WDL   Mouth:  WDL   Neck: WDL   Breast/Chest:  Scar   Shoulder Blades:  Red and Blanching   Spine:   Red, Blanching, and Scar   (R) Arm/Elbow/Hand: WDL   (L) Arm/Elbow/Hand: WDL   Abdomen: Scar   Pannus/Groin:  WDL   Sacrum/Coccyx:   Pink, Red, Blanching, and Moisture   (R) Ischial Tuberosity (Sit Bones):  WDL   (L) Ischial Tuberosity (Sit Bones):  WDL   (R) Leg:  Scar   (L) Leg:  Scar   (R) Heel:  Red and Blanching   (R) Foot/Toe: Abrasion and Pink   (L) Heel: Red and Blanching   (L) Foot/Toe:  WDL       DEVICES IN USE:   Respiratory Devices:  NA, patient on room air  Feeding Devices:  N/A   Lines & BP Monitoring Devices:  Peripheral IV, BP cuff, and Pulse ox    Orthopedic Devices:  N/A  Miscellaneous Devices:  Telemetry monitor    PROTOCOL INTERVENTIONS:   Standard/Trauma Bed:  Already in place    WOUND PHOTOS:   Completed and in EPIC                       WOUND CONSULT:   Consult ordered for the following areas perineum/gluteal fold

## 2025-07-16 NOTE — CARE PLAN
The patient is Stable - Low risk of patient condition declining or worsening    Shift Goals  Clinical Goals: stable neuro exams, mobilize, diagnostics  Patient Goals: eat, rest  Family Goals: n/a    Progress made toward(s) clinical / shift goals:    Problem: Neuro Status  Goal: Neuro status will remain stable or improve  Outcome: Progressing  Note: Q4h neuro checks and NIHSS completed per orders.      Problem: Knowledge Deficit - Standard  Goal: Patient and family/care givers will demonstrate understanding of plan of care, disease process/condition, diagnostic tests and medications  Outcome: Progressing     Problem: Fall Risk  Goal: Patient will remain free from falls  Outcome: Progressing  Note: Bed low and locked with strip alarm used in bed and chair. Pt educated on fall prevention measures in place.        Patient is not progressing towards the following goals: n/a

## 2025-07-16 NOTE — ASSESSMENT & PLAN NOTE
He is on propranolol, will continue   Continue monitor blood pressure.  Long-term SBP goal 100-130

## 2025-07-17 ENCOUNTER — PHARMACY VISIT (OUTPATIENT)
Dept: PHARMACY | Facility: MEDICAL CENTER | Age: 75
End: 2025-07-17
Payer: COMMERCIAL

## 2025-07-17 ENCOUNTER — NON-PROVIDER VISIT (OUTPATIENT)
Dept: CARDIOLOGY | Facility: MEDICAL CENTER | Age: 75
End: 2025-07-17
Payer: COMMERCIAL

## 2025-07-17 ENCOUNTER — APPOINTMENT (OUTPATIENT)
Dept: CARDIOLOGY | Facility: MEDICAL CENTER | Age: 75
DRG: 065 | End: 2025-07-17
Attending: STUDENT IN AN ORGANIZED HEALTH CARE EDUCATION/TRAINING PROGRAM
Payer: COMMERCIAL

## 2025-07-17 VITALS
OXYGEN SATURATION: 92 % | HEIGHT: 73 IN | SYSTOLIC BLOOD PRESSURE: 148 MMHG | BODY MASS INDEX: 27.32 KG/M2 | HEART RATE: 60 BPM | RESPIRATION RATE: 18 BRPM | TEMPERATURE: 97.5 F | WEIGHT: 206.13 LBS | DIASTOLIC BLOOD PRESSURE: 92 MMHG

## 2025-07-17 DIAGNOSIS — I49.1 PREMATURE ATRIAL CONTRACTIONS: ICD-10-CM

## 2025-07-17 DIAGNOSIS — I47.29 NSVT (NONSUSTAINED VENTRICULAR TACHYCARDIA) (HCC): Primary | ICD-10-CM

## 2025-07-17 DIAGNOSIS — I47.10 PSVT (PAROXYSMAL SUPRAVENTRICULAR TACHYCARDIA) (HCC): ICD-10-CM

## 2025-07-17 DIAGNOSIS — I49.3 PREMATURE VENTRICULAR CONTRACTIONS: ICD-10-CM

## 2025-07-17 LAB
ALBUMIN SERPL BCP-MCNC: 4.1 G/DL (ref 3.2–4.9)
ANION GAP SERPL CALC-SCNC: 13 MMOL/L (ref 7–16)
BUN SERPL-MCNC: 18 MG/DL (ref 8–22)
CALCIUM ALBUM COR SERPL-MCNC: 8.9 MG/DL (ref 8.5–10.5)
CALCIUM SERPL-MCNC: 9 MG/DL (ref 8.5–10.5)
CHLORIDE SERPL-SCNC: 104 MMOL/L (ref 96–112)
CO2 SERPL-SCNC: 20 MMOL/L (ref 20–33)
CREAT SERPL-MCNC: 0.94 MG/DL (ref 0.5–1.4)
ERYTHROCYTE [DISTWIDTH] IN BLOOD BY AUTOMATED COUNT: 53.1 FL (ref 35.9–50)
GFR SERPLBLD CREATININE-BSD FMLA CKD-EPI: 84 ML/MIN/1.73 M 2
GLUCOSE SERPL-MCNC: 118 MG/DL (ref 65–99)
HCT VFR BLD AUTO: 52.3 % (ref 42–52)
HGB BLD-MCNC: 17.9 G/DL (ref 14–18)
LV EJECT FRACT  99904: 60
LV EJECT FRACT MOD 2C 99903: 36.08
LV EJECT FRACT MOD 4C 99902: 58.91
LV EJECT FRACT MOD BP 99901: 51.82
MCH RBC QN AUTO: 33.1 PG (ref 27–33)
MCHC RBC AUTO-ENTMCNC: 34.2 G/DL (ref 32.3–36.5)
MCV RBC AUTO: 96.9 FL (ref 81.4–97.8)
PHOSPHATE SERPL-MCNC: 3.7 MG/DL (ref 2.5–4.5)
PLATELET # BLD AUTO: 367 K/UL (ref 164–446)
PMV BLD AUTO: 8.6 FL (ref 9–12.9)
POTASSIUM SERPL-SCNC: 4 MMOL/L (ref 3.6–5.5)
RBC # BLD AUTO: 5.4 M/UL (ref 4.7–6.1)
SODIUM SERPL-SCNC: 137 MMOL/L (ref 135–145)
WBC # BLD AUTO: 9.7 K/UL (ref 4.8–10.8)

## 2025-07-17 PROCEDURE — 99239 HOSP IP/OBS DSCHRG MGMT >30: CPT | Performed by: STUDENT IN AN ORGANIZED HEALTH CARE EDUCATION/TRAINING PROGRAM

## 2025-07-17 PROCEDURE — RXMED WILLOW AMBULATORY MEDICATION CHARGE: Performed by: STUDENT IN AN ORGANIZED HEALTH CARE EDUCATION/TRAINING PROGRAM

## 2025-07-17 PROCEDURE — 80069 RENAL FUNCTION PANEL: CPT

## 2025-07-17 PROCEDURE — 93306 TTE W/DOPPLER COMPLETE: CPT

## 2025-07-17 PROCEDURE — 93306 TTE W/DOPPLER COMPLETE: CPT | Mod: 26 | Performed by: INTERNAL MEDICINE

## 2025-07-17 PROCEDURE — 700102 HCHG RX REV CODE 250 W/ 637 OVERRIDE(OP): Performed by: STUDENT IN AN ORGANIZED HEALTH CARE EDUCATION/TRAINING PROGRAM

## 2025-07-17 PROCEDURE — 36415 COLL VENOUS BLD VENIPUNCTURE: CPT

## 2025-07-17 PROCEDURE — 99222 1ST HOSP IP/OBS MODERATE 55: CPT | Performed by: PSYCHIATRY & NEUROLOGY

## 2025-07-17 PROCEDURE — 700102 HCHG RX REV CODE 250 W/ 637 OVERRIDE(OP): Performed by: INTERNAL MEDICINE

## 2025-07-17 PROCEDURE — 700117 HCHG RX CONTRAST REV CODE 255: Performed by: STUDENT IN AN ORGANIZED HEALTH CARE EDUCATION/TRAINING PROGRAM

## 2025-07-17 PROCEDURE — A9270 NON-COVERED ITEM OR SERVICE: HCPCS | Performed by: INTERNAL MEDICINE

## 2025-07-17 PROCEDURE — A9270 NON-COVERED ITEM OR SERVICE: HCPCS | Performed by: STUDENT IN AN ORGANIZED HEALTH CARE EDUCATION/TRAINING PROGRAM

## 2025-07-17 PROCEDURE — 85027 COMPLETE CBC AUTOMATED: CPT

## 2025-07-17 RX ORDER — ATORVASTATIN CALCIUM 40 MG/1
40 TABLET, FILM COATED ORAL EVERY EVENING
Qty: 100 TABLET | Refills: 3 | Status: SHIPPED | OUTPATIENT
Start: 2025-07-17 | End: 2026-08-21

## 2025-07-17 RX ADMIN — ASPIRIN 81 MG: 81 TABLET, CHEWABLE ORAL at 05:36

## 2025-07-17 RX ADMIN — CARBIDOPA AND LEVODOPA 2 TABLET: 25; 100 TABLET ORAL at 15:36

## 2025-07-17 RX ADMIN — OMEGA-3 FATTY ACIDS CAP 1000 MG 2000 MG: 1000 CAP at 17:15

## 2025-07-17 RX ADMIN — ATORVASTATIN CALCIUM 40 MG: 40 TABLET, FILM COATED ORAL at 17:15

## 2025-07-17 RX ADMIN — OXYCODONE 5 MG: 5 TABLET ORAL at 12:26

## 2025-07-17 RX ADMIN — PROPRANOLOL HYDROCHLORIDE 5 MG: 10 TABLET ORAL at 05:35

## 2025-07-17 RX ADMIN — CARBIDOPA AND LEVODOPA 2 TABLET: 25; 100 TABLET ORAL at 08:35

## 2025-07-17 RX ADMIN — HUMAN ALBUMIN MICROSPHERES AND PERFLUTREN 3 ML: 10; .22 INJECTION, SOLUTION INTRAVENOUS at 14:28

## 2025-07-17 RX ADMIN — EZETIMIBE 10 MG: 10 TABLET ORAL at 05:35

## 2025-07-17 RX ADMIN — MICONAZOLE NITRATE: 2 CREAM TOPICAL at 05:38

## 2025-07-17 RX ADMIN — OMEGA-3 FATTY ACIDS CAP 1000 MG 2000 MG: 1000 CAP at 08:35

## 2025-07-17 RX ADMIN — PROPRANOLOL HYDROCHLORIDE 5 MG: 10 TABLET ORAL at 17:15

## 2025-07-17 ASSESSMENT — PAIN DESCRIPTION - PAIN TYPE
TYPE: ACUTE PAIN
TYPE: ACUTE PAIN

## 2025-07-17 ASSESSMENT — FIBROSIS 4 INDEX: FIB4 SCORE: 1.23

## 2025-07-17 NOTE — CONSULTS
Neurology Initial Consult H&P  Neurohospitalist Service, [facility] Neurosciences    Referring Physician: Eleazar Dougherty M.D.    Chief Complaint   Patient presents with    Unilateral Weakness     L sided. LKW: 1130        HPI: Romario Chaudhari is a 75 year old man with diabetes, hypertension, Parkinson disease, presenting with LUE weakness on 7/15.  Stroke Neurology consulted today to assist with further management.  Romario reports that he acutely developed loss of  strength while sitting on the sofa.   He might of had some mild confusion as well.  He denies LOC, no headache.  He was careflighted to [facility].   In the ER, a stroke protocol CT without evidence of large stroke, hemorrhage, critical stenoses, or vessel occlusion.  He was started on ASA, statin therapy.  Team was unable to attain MRI brain due to a spinal stimulator.   Romario reports that his hand weakness has not resolved but has improved.   He reports medication compliance most of time- missing about 2-3 doses per month.   He does not monitor BP at home.  No prior history of stroke.  On ROS, denies infectious prodrome or constitutional symptoms.    Review of systems: In addition to what is detailed in the HPI above, all other systems reviewed and are negative.    Past Medical History:    has a past medical history of Diabetes (HCC), Hypertension, and Upper GI bleed (2007).    FHx:  family history includes Dementia in his father and mother; Heart Attack (age of onset: 86) in his father.    SHx:   reports that he has never smoked. He has never used smokeless tobacco. He reports current alcohol use. He reports that he does not use drugs.    Allergies:  Allergies[1]    Medications:  Current Medications[2]    Physical Examination:     General: Patient is awake and in no acute distress  Eye: Examination of optic disks not indicated at this time given acuity of consult  Neck: There is normal range of motion  CV: regular rate   Extremities:  clear, dry,  "intact, without peripheral edema    NEUROLOGICAL EXAM:     BP (!) 132/93   Pulse 71   Temp 36.4 °C (97.5 °F) (Temporal)   Resp 18   Ht 1.854 m (6' 1\")   Wt 93.5 kg (206 lb 2.1 oz)   SpO2 90%   BMI 27.20 kg/m²       Mental status: Awake, alert   Speech and language: Speech is clear and fluent. The patient is able to name and repeat, and follow commands  Cranial nerve exam: Visual fields are full. There is no nystagmus. Extraocular muscles are intact. Face is symmetric. He is hard of hearing.  Motor exam: There is sustained antigravity with no downward drift in bilateral arms and legs. Rest tremor in R hand.  There is left finger extensor weakness.  Sensory exam: Reacts to tactile in all 4 extremities, no neglect to double stim   Coordination: No ataxia on bilateral finger-to-nose testing  Gait: Deferred due to patient preference    NIHSS: National Institutes of Health Stroke Scale    [0] 1a:Level of Consciousness    0-alert 1-drowsy   2-stupor   3-coma  [0] 1b:LOC Questions                  0-both  1-one      2-neither  [0] 1c:LOC Commands                   0-both  1-one      2-neither  [0] 2: Best Gaze                     0-nl    1-partial  2-forced  [0] 3: Visual Fields                   0-nl    1-partial  2-complete 3-bilat  [0] 4: Facial Paresis                0-nl    1-minor    2-partial  3-full  MOTOR                       0-nl  [0] 5: Right Arm           1-drift  [0] 6: Left Arm             2-some effort vs gravity  [0] 7: Right Leg           3-no effort vs gravity  [0] 8: Left Leg             4-no movement                             x-untestable  [0] 9: Limb Ataxia                    0-abs   1-1_limb   2-2+_limbs       x-untestable  [0] 10:Sensory                        0-nl    1-partial  2-dense  [0] 11:Best Language/Aphasia         0-nl    1-mild/mod 2-severe   3-mute  [0] 12:Dysarthria                     0-nl    1-mild/mod 2-severe       x-untestable  [0] 13:Neglect/Inattention            0-none  " 1-partial  2-complete  [0] TOTAL      Objective Data:    Labs:  Lab Results   Component Value Date/Time    PROTHROMBTM 15.0 (H) 07/15/2025 05:47 PM    INR 1.18 (H) 07/15/2025 05:47 PM      Lab Results   Component Value Date/Time    WBC 9.7 07/17/2025 12:34 AM    RBC 5.40 07/17/2025 12:34 AM    HEMOGLOBIN 17.9 07/17/2025 12:34 AM    HEMATOCRIT 52.3 (H) 07/17/2025 12:34 AM    MCV 96.9 07/17/2025 12:34 AM    MCH 33.1 (H) 07/17/2025 12:34 AM    MCHC 34.2 07/17/2025 12:34 AM    MPV 8.6 (L) 07/17/2025 12:34 AM    NEUTSPOLYS 64.40 07/16/2025 01:40 AM    LYMPHOCYTES 19.60 (L) 07/16/2025 01:40 AM    MONOCYTES 13.00 07/16/2025 01:40 AM    EOSINOPHILS 1.80 07/16/2025 01:40 AM    BASOPHILS 0.80 07/16/2025 01:40 AM      Lab Results   Component Value Date/Time    SODIUM 137 07/17/2025 12:34 AM    POTASSIUM 4.0 07/17/2025 12:34 AM    CHLORIDE 104 07/17/2025 12:34 AM    CO2 20 07/17/2025 12:34 AM    GLUCOSE 118 (H) 07/17/2025 12:34 AM    BUN 18 07/17/2025 12:34 AM    CREATININE 0.94 07/17/2025 12:34 AM      Lab Results   Component Value Date/Time    CHOLSTRLTOT 190 07/16/2025 01:40 AM     (H) 07/16/2025 01:40 AM    HDL 46 07/16/2025 01:40 AM    TRIGLYCERIDE 211 (H) 07/16/2025 01:40 AM       Lab Results   Component Value Date/Time    ALKPHOSPHAT 71 07/16/2025 01:40 AM    ASTSGOT 20 07/16/2025 01:40 AM    ALTSGPT 11 07/16/2025 01:40 AM    TBILIRUBIN 1.1 07/16/2025 01:40 AM        Imaging/Testing:    I interpreted and/or reviewed the patient's neuroimaging    EC-ECHOCARDIOGRAM COMPLETE W/ CONT         DX-CHEST-PORTABLE (1 VIEW)   Final Result         No acute cardiac or pulmonary abnormality is identified.      CT-CTA NECK WITH & W/O-POST PROCESSING   Final Result      CT angiogram of the neck within normal limits.      CT-CTA HEAD WITH & W/O-POST PROCESS   Final Result      Atherosclerosis without significant stenosis, occlusion, or aneurysm.      CT-CEREBRAL PERFUSION ANALYSIS   Final Result      1. Cerebral blood flow less  "than 30% possibly representing completed infarct = 0 mL. Based on distribution of this finding, this is unlikely to represent artifact.      2. T Max more than 6 seconds possibly representing combination of completed infarct and ischemia = 3 mL. Based on the distribution of this finding, this is possibly artifact.      3. Mismatched volume possibly representing ischemic brain/penumbra= 3 mL      4.  Please note that this cerebral perfusion study and report is Quantitative and targets supratentorial (cerebral) perfusion for evaluation of large vessel territory acute ischemia/infarction. For example, lacunar infarcts, and brainstem/posterior fossa    ischemia/infarction are not evaluated on this study.  Data acquisition is subject to artifacts which can yield non-anatomically plausible perfusion maps which may be due to motion, bolus timing, signal to noise ratio, or other technical factors.    Perfusion map abnormalities which show non-anatomic distributions are likely artifact.   This study is not \"stand-alone\" and should only be utilized for diagnosis, management/treatment in correlation with CT, CTA, and/or MRI and clinical factors.         MR-BRAIN-W/O    (Results Pending)       Assessment and Plan:  Romario Chaudhari is a 75 year-old man with multiple vascular risk factors presenting with acute onset L hand weakness.  Unable to attain MRI, but clinical semiology highly suggestive of an ischemic stroke event.   Presumed atheroembolic disease- will treat with ASA, high intensity statin and BP control.  I do recommend completing embolic evaluation with TTE and ziopatch at discharge.      Problem list:   Stroke by clinical assessment   Hypertension   Hyperlipidemia   Type 2 diabetes    Plan:   - q4h neurochecks/NIHSS while admitted   - continue ASA 81mg daily, counseled on drug compliance   - long-term BP goal is 110-130/60-80, may restart enteral anti-hypertensives, titrate as needed to goal   - stroke labs:  HgbA1c " 6.1,    - continue atorvastatin 40mg daily for goal LDL < 70   - DM control for HgbA1c goal < 7   - complete embolic evaluation with TTE without bubble study, and ziopatch monitoring at discharge   - [facility] Stroke clinic follow up    The evaluation of the patient, and recommended management, was discussed with Dr. Dougherty, attending hospitalist.     Graeme Argueta MD  Vascular Neurology         [1] No Known Allergies  [2]   Current Facility-Administered Medications:     insulin lispro (HumaLOG,AdmeLOG) subcutaneous injection, 1-6 Units, Subcutaneous, 4X/DAY ACHS **AND** POC blood glucose manual result, , , Q AC AND BEDTIME(S) **AND** NOTIFY MD and PharmD, , , Once **AND** Administer 20 grams of glucose (approximately 8 ounces of fruit juice) every 15 minutes PRN FSBG less than 70 mg/dL, , , PRN **AND** dextrose 50 % (D50W) injection 25 g, 25 g, Intravenous, Q15 MIN PRN, EVELIN JulesPVy    miconazole (Micotin) 2 % cream, , Topical, BID, Eleazar Dougherty M.D., Given at 07/17/25 0538    carbidopa-levodopa (Sinemet)  MG tablet 2 Tablet, 2 Tablet, Oral, TID, Eleazar Dougherty M.D., 2 Tablet at 07/17/25 0835    ezetimibe (Zetia) tablet 10 mg, 10 mg, Oral, DAILY, Eleazar Dougherty M.D., 10 mg at 07/17/25 0535    propranolol (Inderal) tablet 5 mg, 5 mg, Oral, BID, Eleazar Dougherty M.D., 5 mg at 07/17/25 0535    fish oil capsule 2,000 mg, 2,000 mg, Oral, BID WITH MEALS, Eleazar Dougherty M.D., 2,000 mg at 07/17/25 0835    enoxaparin (Lovenox) inj 40 mg, 40 mg, Subcutaneous, DAILY AT 1800, Danish Houser M.D., 40 mg at 07/16/25 1707    acetaminophen (Tylenol) tablet 650 mg, 650 mg, Oral, Q6HRS PRN, Danish Houser M.D.    oxyCODONE immediate-release (Roxicodone) tablet 2.5 mg, 2.5 mg, Oral, Q3HRS PRN **OR** oxyCODONE immediate-release (Roxicodone) tablet 5 mg, 5 mg, Oral, Q3HRS PRN, 5 mg at 07/17/25 1226 **OR** morphine 4 MG/ML injection 2 mg, 2 mg, Intravenous, Q3HRS PRN, LUZ MARIA [place] (Pericolace  Or Senokot S) 8.6-50 MG per tablet 2 Tablet, 2 Tablet, Oral, Q EVENING, 2 Tablet at 07/16/25 1710 **AND** polyethylene glycol/lytes (Miralax) Packet 1 Packet, 1 Packet, Oral, QDAY PRN, Danish Houser M.D.    ondansetron (Zofran) syringe/vial injection 4 mg, 4 mg, Intravenous, Q4HRS PRN **OR** ondansetron (Zofran ODT) dispertab 4 mg, 4 mg, Oral, Q4HRS PRN, Danish Houser M.D.    aspirin (Asa) chewable tab 81 mg, 81 mg, Oral, DAILY, 81 mg at 07/17/25 0536 **OR** aspirin (Asa) suppository 300 mg, 300 mg, Rectal, DAILY, Danish Houser M.D.    atorvastatin (Lipitor) tablet 40 mg, 40 mg, Oral, Q EVENING, Danish Houser M.D., 40 mg at 07/16/25 1708

## 2025-07-17 NOTE — DISCHARGE PLANNING
Romario is not requiring 2 of 3 disciplines. TCC will no longer follow.  Please reach out to myself with any questions.

## 2025-07-17 NOTE — DISCHARGE SUMMARY
Discharge Summary    CHIEF COMPLAINT ON ADMISSION  Chief Complaint   Patient presents with    Unilateral Weakness     L sided. LKW: 1130        Reason for Admission  stroke     Admission Date  7/15/2025    CODE STATUS  Full Code    HPI & HOSPITAL COURSE  This is a 75 y.o. male type 2 diabetes, Parkinson disease, hypertension, CAD s/p CABG, hx of GI bleed who presented 7/15/2025 with complaints of left-sided weakness and expressive aphasia.  By the time of arrival to ER symptoms improved, aphasia resolved and patient only has residual left hand  mild weakness.  He denies LOC, no headache. He was careflighted to [facility].     In the ER, a stroke protocol CT without evidence of large stroke, hemorrhage, critical stenoses, or vessel occlusion. He was started on ASA, statin therapy.  Unable to attain MRI brain due to a spinal stimulator. Patient reports that his hand weakness has not resolved but has improved.   A1c 6.1, .  Neurology was consulted and recommends continue ASA 81mg daily, counseled on drug compliance. Continue atorvastatin 40mg daily for goal LDL < 70. DM control for HgbA1c goal < 7. Long-term BP goal is 110-130/60-80. Echo noted LVEF to 65%.  No significant valve abnormality.  Negative bubble study.   Zio patch applied prior to discharge.  He is advised to follow-up with PCP and stroke clinic.  Patient was evaluated by PT/OT/SLP.  Outpatient OT referral placed.  Therefore, he is discharged in good and stable condition to home with close outpatient follow-up.    The patient met 2-midnight criteria for an inpatient stay at the time of discharge.    Discharge Date  7/17    FOLLOW UP ITEMS POST DISCHARGE  PCP  Stroke bridge clinic     DISCHARGE DIAGNOSES  Principal Problem:    Acute CVA (cerebrovascular accident) (HCC) (POA: Yes)  Active Problems:    Essential hypertension (POA: Yes)    Type 2 diabetes mellitus without complication, without long-term current use of insulin (HCC) (POA: Yes)    Hx of  CABG (POA: Yes)    Parkinson disease (HCC) (POA: Unknown)    Spinal cord stimulator status (POA: Unknown)    Advance care planning (POA: Unknown)  Resolved Problems:    * No resolved hospital problems. *      FOLLOW UP  No future appointments.  No follow-up provider specified.    MEDICATIONS ON DISCHARGE     Medication List        START taking these medications        Instructions   atorvastatin 40 MG Tabs  Commonly known as: Lipitor   Take 1 Tablet by mouth every evening.  Dose: 40 mg            CONTINUE taking these medications        Instructions   ALPRAZolam 0.5 MG Tabs  Commonly known as: Xanax   Take 0.5 mg by mouth 2 times a day.  Dose: 0.5 mg     aspirin 81 MG EC tablet   Take 81 mg by mouth every day.  Dose: 81 mg     carbidopa-levodopa  MG Tabs  Commonly known as: Sinemet   Take 2 Tablets by mouth 3 times a day.  Dose: 2 Tablet     ezetimibe 10 MG Tabs  Commonly known as: Zetia   Take 10 mg by mouth every day.  Dose: 10 mg     metFORMIN 500 MG Tabs  Commonly known as: Glucophage   Take 500 mg by mouth 2 times a day.  Dose: 500 mg     propranolol 10 MG Tabs  Commonly known as: Inderal   Take 5 mg by mouth 2 times a day.  Dose: 5 mg     Vascepa 1 g Caps  Generic drug: Icosapent Ethyl   Take 2 g by mouth 2 times a day with meals. 2 caps = 2 gms  Dose: 2 g              Allergies  Allergies[1]    DIET  Orders Placed This Encounter   Procedures    Diet Order Diet: Consistent CHO (Diabetic)     Standing Status:   Standing     Number of Occurrences:   1     Diet::   Consistent CHO (Diabetic) [4]       ACTIVITY  As tolerated.  Weight bearing as tolerated    CONSULTATIONS  neurology    PROCEDURES  na    LABORATORY  Lab Results   Component Value Date    SODIUM 137 07/17/2025    POTASSIUM 4.0 07/17/2025    CHLORIDE 104 07/17/2025    CO2 20 07/17/2025    GLUCOSE 118 (H) 07/17/2025    BUN 18 07/17/2025    CREATININE 0.94 07/17/2025        Lab Results   Component Value Date    WBC 9.7 07/17/2025    HEMOGLOBIN 17.9  "07/17/2025    HEMATOCRIT 52.3 (H) 07/17/2025    PLATELETCT 367 07/17/2025      EC-ECHOCARDIOGRAM COMPLETE W/ CONT   Final Result      DX-CHEST-PORTABLE (1 VIEW)   Final Result         No acute cardiac or pulmonary abnormality is identified.      CT-CTA NECK WITH & W/O-POST PROCESSING   Final Result      CT angiogram of the neck within normal limits.      CT-CTA HEAD WITH & W/O-POST PROCESS   Final Result      Atherosclerosis without significant stenosis, occlusion, or aneurysm.      CT-CEREBRAL PERFUSION ANALYSIS   Final Result      1. Cerebral blood flow less than 30% possibly representing completed infarct = 0 mL. Based on distribution of this finding, this is unlikely to represent artifact.      2. T Max more than 6 seconds possibly representing combination of completed infarct and ischemia = 3 mL. Based on the distribution of this finding, this is possibly artifact.      3. Mismatched volume possibly representing ischemic brain/penumbra= 3 mL      4.  Please note that this cerebral perfusion study and report is Quantitative and targets supratentorial (cerebral) perfusion for evaluation of large vessel territory acute ischemia/infarction. For example, lacunar infarcts, and brainstem/posterior fossa    ischemia/infarction are not evaluated on this study.  Data acquisition is subject to artifacts which can yield non-anatomically plausible perfusion maps which may be due to motion, bolus timing, signal to noise ratio, or other technical factors.    Perfusion map abnormalities which show non-anatomic distributions are likely artifact.   This study is not \"stand-alone\" and should only be utilized for diagnosis, management/treatment in correlation with CT, CTA, and/or MRI and clinical factors.         MR-BRAIN-W/O    (Results Pending)       Total time of the discharge process 33 minutes.         [1] No Known Allergies    "

## 2025-07-17 NOTE — PROGRESS NOTES
Monitor summary: SB/SR 45-72, DC 0.27, QRS 0.10, QT 0.42, with rare PVCs per strip from monitor room.

## 2025-07-17 NOTE — CARE PLAN
The patient is Stable - Low risk of patient condition declining or worsening    Shift Goals  Clinical Goals: Stable Neuro Status, MRI, Safety  Patient Goals: Use bathroom  Family Goals: Updates    Progress made toward(s) clinical / shift goals:    Problem: Neuro Status  Goal: Neuro status will remain stable or improve  Outcome: Progressing     Problem: Hemodynamic Monitoring  Goal: Patient's hemodynamics, fluid balance and neurologic status will be stable or improve  Outcome: Progressing     Problem: Risk for Aspiration  Goal: Patient's risk for aspiration will be absent or decrease  Outcome: Progressing     Problem: Urinary Elimination  Goal: Establish and maintain regular urinary output  Outcome: Progressing     Problem: Mobility - Stroke  Goal: Patient's capacity to carry out activities will improve  Outcome: Progressing     Problem: Skin Integrity  Goal: Skin integrity is maintained or improved  Outcome: Progressing     Problem: Fall Risk  Goal: Patient will remain free from falls  Outcome: Progressing     Problem: Pain - Standard  Goal: Alleviation of pain or a reduction in pain to the patient’s comfort goal  Outcome: Progressing       Patient is not progressing towards the following goals:

## 2025-07-17 NOTE — DISCHARGE PLANNING
Care Transition Team Assessment    LMSW met with pt at bedside, pt verified information for face sheet.  [place] [address] [place] [address] [facility] [facility] [place] [address]  PCP is Parisa Burns at [facility].  Pt has DPOA and STEFAN documents, SW requested DPA to save to pt's media.  For DME, pt has can and walker, no home oxygen.  Pt has an electric scooter that was mother in law's if needed in the future.  Pt is independent with ADLS.  Pt receives [employer] and ubigrate [facility] income, amount not disclosed.  Pt reports is a 100% service connected disabled [place].  Pt has good support from spouse, as well as daughters Gilda ([place]) and Rosaura ([place]).  Preferred pharmacy is [facility] in [place].  Pt denies history of post acute placement or HH services.  Pt denies SA or MH concerns. Pt's spouse provides pt with transportation support at baseline.  Pt reports has insurance coverage via [employer], [employer], and [employer].  Pt inquired about outpatient OT, BELTRAN placed message to MD requesting outpatient OT referral order.  IMM delivered, faxed to DPA.    Information Source  Orientation Level: Oriented X4  Information Given By: Patient  Who is responsible for making decisions for patient? : Patient    Readmission Evaluation  Is this a readmission?: No    Elopement Risk  Legal Hold: No  Ambulatory or Self Mobile in Wheelchair: Yes  Disoriented: No  Psychiatric Symptoms: None  History of Wandering: No  Elopement this Admit: No  Vocalizing Wanting to Leave: No  Displays Behaviors, Body Language Wanting to Leave: No-Not at Risk for Elopement  Elopement Risk: Not at Risk for Elopement    Interdisciplinary Discharge Planning  Lives with - Patient's Self Care Capacity: Spouse  Patient or legal guardian wants to designate a caregiver: No  Support Systems: Children, Family Member(s)  Housing / Facility: [address]  Prior Services: None    Discharge Preparedness  What is your plan after discharge?:  Home with help  What are your discharge supports?: Spouse, Child  Prior Functional Level: Independent with Activities of Daily Living  Difficulity with ADLs: Walking    Functional Assesment  Prior Functional Level: Independent with Activities of Daily Living    Finances  Financial Barriers to Discharge: No  Prescription Coverage: Yes    Vision / Hearing Impairment  Vision Impairment : Yes  Right Eye Vision: Impaired, Wears Glasses  Left Eye Vision: Impaired, Wears Glasses  Hearing Impairment : Yes  Hearing Impairment: Both Ears, Hearing Device(s) Available  Does Pt Need Special Equipment for the Hearing Impaired?: No    Advance Directive  Advance Directive?: DPOA for Health Care    Domestic Abuse  Have you ever been the victim of abuse or violence?: No  Possible Abuse/Neglect Reported to:: Not Applicable    Psychological Assessment  History of Substance Abuse: None  History of Psychiatric Problems: No  Non-compliant with Treatment: No  Newly Diagnosed Illness: No    Discharge Risks or Barriers  Discharge risks or barriers?: No  Patient risk factors: Vulnerable adult    Anticipated Discharge Information  Discharge Disposition: Discharged to home/self care (01)  Discharge Address: [address] [place] [id number]  Discharge Contact Phone Number: [phone]

## 2025-07-17 NOTE — PROGRESS NOTES
Monitor Summary: SB/SR 53-62, [place] .22, QRS .11, QT .42, with rare and occasional PACs , and rare PVCs, and a BBB per strip from monitor room.

## 2025-07-17 NOTE — CARE PLAN
The patient is Stable - Low risk of patient condition declining or worsening    Shift Goals  Clinical Goals: stable neuro exams, MRI  Patient Goals: go home  Family Goals: updates, MRI    Progress made toward(s) clinical / shift goals:      Problem: Neuro Status  Goal: Neuro status will remain stable or improve  Outcome: Progressing  Note: Q4h neuro checks and NIHSS completed per orders.      Problem: Fall Risk  Goal: Patient will remain free from falls  Outcome: Progressing  Note: Bed low and locked with strip alarm in place. Pt and family educated on fall prevention measures in place.      Problem: Pain - Standard  Goal: Alleviation of pain or a reduction in pain to the patient’s comfort goal  Outcome: Progressing  Note: Pt medicated per MAR for pain control. Rest and repositioning used as non-pharmacological adjuncts.        Patient is not progressing towards the following goals: n/a

## 2025-07-17 NOTE — DISCHARGE INSTRUCTIONS
"Discharge Instructions per Eleazar Dougherty M.D.  Continue ASA 81mg daily   continue atorvastatin 40mg daily for goal LDL < 70  DM control for HgbA1c goal < 7  Please follow-up with PCP as outpatient.  Outpatient follow up with stroke clinic    Return to ER in the event of new or worsening symptoms. Please note importance of compliance and the patient has agreed to proceed with all medical recommendations and follow up plan indicated above. All medications come with benefits and risks. Risks may include permanent injury or death and these risks can be minimized with close reassessment and monitoring. Please make it to your scheduled follow ups with PCP and stroke clinic     Ischemic Stroke  Discharge Instructions    You experienced an Ischemic Stroke.  Ischemic stroke is the most common type of stroke and happens when an artery in the brain becomes blocked by a plaque fragment or blood clot. Typically, these blockages travel from the heart or larger arteries that supply the brain.  The brain needs a constant supply of blood, which carries oxygen and nutrients it needs to function.  A stroke occurs when one of these arteries to the brain is either blocked or bursts. As a result, part of the brain does not get the blood it needs, so it starts to die.         Stroke Risk Factors    You are at increased risk of having another stroke event.  See your Patient Stroke Guide to help reduce your stroke risk. These are your specific risk factors:  Age - Over 55  Artery Disease / Peripheral Vascular Disease   Cardiovascular disease  Carotid Stenosis   Diabetes  Gender - Men are at a higher risk than women  Heart disease  High blood pressure  High Cholesterol and lipids  Inactivity or Overweight / High BMI  Not taking your medications as prescribed  Previous TIAs or \"mini strokes\"     Get help right away if you have any signs of a stroke.  \"BE FAST\" is an easy way to remember the main warning signs of a stroke:  B - Balance. " Dizziness, sudden trouble walking, or loss of balance.  E - Eyes. Trouble seeing or a change in how you see.  F - Face. Sudden weakness or loss of feeling in the face. The face or eyelid may droop on one side.  A - Arms. Weakness or loss of feeling in an arm. This happens all of a sudden and most often on one side of the body.  S - Speech. Sudden trouble speaking, slurred speech, or trouble understanding what people say.  T - Time. Time to call emergency services. Write down what time symptoms started.

## 2025-07-17 NOTE — THERAPY
Occupational Therapy   Initial Evaluation     Patient Name:  Romario Chaudhari  Age:  75 y.o., Sex:  male  Medical Record #:  [id number]  Today's Date:  7/16/2025     Precautions  Medical: Fall Risk  Swallowing: Swallow Precautions    Assessment    Patient is 75 y.o. male admitted with L sided weakness, expressive aphasia, facial droop per family, most symptoms resolved with exception of L hand weakness, MRI pending. Pmhx includes type 2 diabetes, Parkinson disease, hypertension, CAD s/p CABG. Pt reports he is L hand dominant (R hand with parkinsonian tremors) however he demonstrates functional bilateral coordination and use of LUE for container mgmt, bringing cup to mouth etc despite mild weakness and decreased coordination. Pt with supportive wife, local daughter and daughter visiting from out of state at bedside, family confirms ample support at home for ADL assist as needed upon DC.    Plan    Occupational Therapy Initial Treatment Plan   Treatment Interventions: Self Care / Activities of Daily Living, Adaptive Equipment, Therapeutic Exercises, Therapeutic Activity, Neuro Re-Education / Balance  Treatment Frequency: 3 Times per Week  Duration: Until Therapy Goals Met    DC Equipment Recommendations: Grab Bar(s) in Tub / Shower  Discharge Recommendations: Recommend outpatient occupational therapy services to address higher level deficits (educated on outpatient hand therapy for dominant L hand, [place] services)      Objective     07/16/25 0945   Prior Living Situation   Prior Services None   Housing / Facility 1 Story House   Steps Into Home 0   Steps In Home 0   Bathroom Set up Bathtub / Shower Combination;Shower Chair;Grab Bars   Equipment Owned Front-Wheel Walker;Single Point Cane;Tub / Shower Seat;Grab Bar(s) In Tub / Shower   Lives with - Patient's Self Care Capacity Spouse   Comments reports was not using DME prior   Prior Level of ADL Function   Self Feeding Independent    Grooming / Hygiene Independent   Bathing Independent   Dressing Independent   Toileting Independent   Comments mod I, uses adaptive strategies including slip on shoes for independent dressing   Prior Level of IADL Function   Medication Management Independent   Laundry Requires Assist   Kitchen Mobility Requires Assist   Finances Requires Assist   Home Management Requires Assist   Shopping Requires Assist   Prior Level Of Mobility Supervision Without Device in Home   Driving / Transportation Driving Independent   Comments wife and local daughter assist with IADLs   Precautions   Medical Fall Risk   Swallowing Swallow Precautions   Vitals   O2 Delivery Device None - Room Air   Pain 0 - 10 Group   Therapist Pain Assessment 0;Nurse Notified;Post Activity Pain Same as Prior to Activity   Cognition    Cognition / Consciousness X   Speech/ Communication Hard of Hearing   Level of Consciousness Alert   Comments set in his ways, minimal receptivity to suggestions   Active ROM Upper Body   Active ROM Upper Body  WDL   Dominant Hand Left   Comments reports ambidextrous, gross motor R, fine motor L   Strength Upper Body   Upper Body Strength  WDL   Comments mild L  weakness   Coordination Upper Body   Coordination X   Fine Motor Coordination delayed fine motor in L hand compared to R   Balance Assessment   Sitting Balance (Static) Fair   Sitting Balance (Dynamic) Fair   Standing Balance (Static) Fair   Standing Balance (Dynamic) Fair   Weight Shift Sitting Fair   Weight Shift Standing Fair   Bed Mobility    Supine to Sit Supervised   Sit to Supine Supervised   ADL Assessment   Eating Modified Independent   Grooming Seated;Supervision   Upper Body Dressing Supervision   Lower Body Dressing   (slip on shoes at baseline)   Toileting   (declined need)   How much help from another person does the patient currently need...   Putting on and taking off regular lower body clothing? 3   Bathing (including washing, rinsing, and  drying)? 3   Toileting, which includes using a toilet, bedpan, or urinal? 3   Putting on and taking off regular upper body clothing? 3   Taking care of personal grooming such as brushing teeth? 4   Eating meals? 4   6 Clicks Daily Activity Score 20   Functional Mobility   Sit to Stand Contact Guard Assist   Visual Perception   Comments denied visual changes, explained potential for vision changes impacting ability to drive   Activity Tolerance   Comments pt denies deficits in baseline activity tolerance   Short Term Goals   Short Term Goal # 1 pt will complete LB dress with AE PRN at SPV level   Short Term Goal # 2 pt will complete toileting ADL at SPV level   Education Group   Education Provided Pathology of bedrest;Activities of Daily Living;Role of Occupational Therapist;Transfers;Stroke;Home Safety;Coordination   Role of Occupational Therapist Patient Response Patient;Family;Significant Other;Acceptance;Explanation;Verbal Demonstration   Coordination Patient Response Patient;Family;Significant Other;Acceptance;Explanation;Verbal Demonstration   Home Safety Patient Response Patient;Family;Significant Other;Acceptance;Explanation;Verbal Demonstration   Transfers Patient Response Patient;Family;Significant Other;Acceptance;Explanation;Verbal Demonstration   Stroke Patient Response Patient;Family;Significant Other;Acceptance;Explanation;Verbal Demonstration   ADL Patient Response Patient;Family;Significant Other;Acceptance;Explanation;Verbal Demonstration   Pathology of Bedrest Patient Response Patient;Family;Significant Other;Acceptance;Explanation;Verbal Demonstration   Additional Comments two daughters and wife at bedside   Occupational Therapy Initial Treatment Plan    Treatment Interventions Self Care / Activities of Daily Living;Adaptive Equipment;Therapeutic Exercises;Therapeutic Activity;Neuro Re-Education / Balance   Treatment Frequency 3 Times per Week   Duration Until Therapy Goals Met   Problem List    Problem List Decreased Active Daily Living Skills;Decreased Homemaking Skills;Impaired Coordination Left Upper Extremity;Decreased Activity Tolerance;Decreased Functional Mobility   Anticipated Discharge Equipment and Recommendations   DC Equipment Recommendations Grab Bar(s) in Tub / Shower   Discharge Recommendations Recommend outpatient occupational therapy services to address higher level deficits  (educated on outpatient hand therapy for dominant L hand, [place])   Interdisciplinary Plan of Care Collaboration   IDT Collaboration with  Nursing;Family / Caregiver;Physician   Patient Position at End of Therapy Seated;In Bed;Call Light within Reach;Tray Table within Reach;Phone within Reach

## 2025-07-18 NOTE — PROGRESS NOTES
Monitor summary: SR/SB, HR 52-69, FL 0.24, QRS 0.11, QT 0.44 with (R&O)PVCs (R)PACs per strip from monitor room

## 2025-07-18 NOTE — PROGRESS NOTES
PIV removed, discharge instructions discussed with pt and family bedside. Ziopatch placed, pt and family verbalized understanding of teaching. Pt discharged with family and belongings.

## 2025-07-23 ENCOUNTER — TELEPHONE (OUTPATIENT)
Dept: NEUROLOGY | Facility: MEDICAL CENTER | Age: 75
End: 2025-07-23
Payer: COMMERCIAL

## 2025-07-25 ENCOUNTER — TELEPHONE (OUTPATIENT)
Dept: NEUROLOGY | Facility: MEDICAL CENTER | Age: 75
End: 2025-07-25
Payer: COMMERCIAL

## 2025-08-06 ENCOUNTER — TELEPHONE (OUTPATIENT)
Dept: CARDIOLOGY | Facility: MEDICAL CENTER | Age: 75
End: 2025-08-06
Payer: COMMERCIAL

## 2025-08-08 PROCEDURE — 93228 REMOTE 30 DAY ECG REV/REPORT: CPT | Performed by: INTERNAL MEDICINE

## 2025-08-11 ENCOUNTER — RESULTS FOLLOW-UP (OUTPATIENT)
Dept: CARDIOLOGY | Facility: MEDICAL CENTER | Age: 75
End: 2025-08-11
Payer: COMMERCIAL

## (undated) DEVICE — CANNULA W/ SUPPLY TUBING O2 - (50/CA)

## (undated) DEVICE — SET FLUID WARMING STANDARD FLOW - (10/CA)

## (undated) DEVICE — MICRODRIP PRIMARY VENTED 60 (48EA/CA) THIS WAS PART #2C8428 WHICH WAS DISCONTINUED

## (undated) DEVICE — SUTURE  0 ETHIBOND CT-1 30 IN (36PK/BX)

## (undated) DEVICE — NEPTUNE 4 PORT MANIFOLD - (20/PK)

## (undated) DEVICE — SUTURE 4-0 PROLENE RB-1 D/A 36 (36PK/BX)"

## (undated) DEVICE — GOWN WARMING STANDARD FLEX - (30/CA)

## (undated) DEVICE — TUBING CLEARLINK DUO-VENT - C-FLO (48EA/CA)

## (undated) DEVICE — GLOVE BIOGEL SZ 7.5 SURGICAL PF LTX - (50PR/BX 4BX/CA)

## (undated) DEVICE — RETRACTOR OFF PUMP OCTO ONLY - 10/BX

## (undated) DEVICE — SET EXTENSION WITH 2 PORTS (48EA/CA) ***PART #2C8610 IS A SUBSTITUTE*****

## (undated) DEVICE — KIT ANESTHESIA W/CIRCUIT & 3/LT BAG W/FILTER (20EA/CA)

## (undated) DEVICE — SOLUTION NORMOSOL-4 INJ 1000ML

## (undated) DEVICE — SYRINGE SAFETY 3 ML 18 GA X 1 1/2 BLUNT LL (100/BX 8BX/CA)

## (undated) DEVICE — SUTURE 4-0 30CM STRATAFIX SPIRAL PS-2 (12EA/BX)

## (undated) DEVICE — KIT ARTERIAL LINE 20 GA - (10/BX)

## (undated) DEVICE — FILM CASSETTE ENDO

## (undated) DEVICE — NEEDLE SAFETY 18 GA X 1 1/2 IN (100EA/BX)

## (undated) DEVICE — DRESSING TRANSPARENT FILM TEGADERM 4 X 4.75" (50EA/BX)"

## (undated) DEVICE — SUTURE 6-0 PROLENE RB-2 D/A 30 (36PK/BX)"

## (undated) DEVICE — TUBE CHEST 32FR. RIGHT ANGLED (10EA/CA)

## (undated) DEVICE — PUNCH DISP VASCULAR 4.4 - 6/BX

## (undated) DEVICE — ELECTRODE 850 FOAM ADHESIVE - HYDROGEL RADIOTRNSPRNT (50/PK)

## (undated) DEVICE — SUTURE 7-0 PROLENE 24BV175-6 - EP8735H (36/BX)"

## (undated) DEVICE — HEMOSTAT SURG ABSORBABLE - 2 X 3 IN SURGICEL (24EA/CA)

## (undated) DEVICE — TRAP POLYP E-TRAP (25EA/BX)

## (undated) DEVICE — DRAIN SILICONE CLOSED WOUND SUCTION CHANNEL 24FR ROUND HUBLESS (10/CA)

## (undated) DEVICE — SYSTEM PEEL & PLACE 13CM INCISIONS

## (undated) DEVICE — SUTURE 0 ETHIBOND MO6 C/R - (12/BX) 8-18 INCH ETHICON

## (undated) DEVICE — SUTURE 5 SURGICAL STEEL V-40 - (12/BX) CCS CURRENT

## (undated) DEVICE — CLIP SM INTNL HRZN TI ESCP LGT - (24EA/PK 25PK/BX)

## (undated) DEVICE — SYS DLV COST CLS RM TEMP - INJECTATE (CO-SET II) (10EA/CA)

## (undated) DEVICE — SUCTION INSTRUMENT YANKAUER BULBOUS TIP W/O VENT (50EA/CA)

## (undated) DEVICE — MASK ANESTHESIA ADULT  - (100/CA)

## (undated) DEVICE — SUTURE 5-0 PROLENE C-1 D/A 24 (36PK/BX)"

## (undated) DEVICE — TUBE CHEST 32FR. STRAIGHT - (10EA/CA)

## (undated) DEVICE — PACK E SUTURE USED FOR - OPEN HEART  (5/BX)

## (undated) DEVICE — SENSOR SPO2 NEO LNCS ADHESIVE (20/BX) SEE USER NOTES

## (undated) DEVICE — TRAY SURESTEP FOLEY TEMP SENSING 16FR (10EA/CA) ORDER  #18764 FOR TEMP FOLEY ONLY

## (undated) DEVICE — D-5-W INJ. 500 ML - (24EA/CA)

## (undated) DEVICE — SODIUM CHL IRRIGATION 0.9% 1000ML (12EA/CA)

## (undated) DEVICE — ELECTRODE DUAL RETURN W/ CORD - (50/PK)

## (undated) DEVICE — SNARECAPTIVATOR 13MM SMALL HEXAGONAL SNARE (10/BX)

## (undated) DEVICE — ARMBOARD  SMALL IV 9 INLONG - (25EA/CA)

## (undated) DEVICE — GLOVE BIOGEL PI ORTHO SZ 7.5 PF LF (40PR/BX)

## (undated) DEVICE — TUBING INSUFFLATION - (10/BX)

## (undated) DEVICE — SLEEVE, VASO, THIGH, MED

## (undated) DEVICE — PROTECTOR ULNA NERVE - (36PR/CA)

## (undated) DEVICE — SOD. CHL. INJ. 0.9% 1000 ML - (14EA/CA 60CA/PF)

## (undated) DEVICE — BLADE STERNUM SAW SURGICAL 32.0 X 6.4 MM STERILE (1/EA)

## (undated) DEVICE — KIT TOURNIQUET DLP (40EA/PK)

## (undated) DEVICE — DRAIN CHEST ADULT (6EA/CA) DELETED ITEM  ORDER #15909

## (undated) DEVICE — TUBE E-T HI-LO CUFF 8.0MM (10EA/PK)

## (undated) DEVICE — TRANSDUCER BIFURCATED MONITORING KIT (10EA/CA)

## (undated) DEVICE — DRAPE MAYO STAND - (30/CA)

## (undated) DEVICE — KIT SURGIFLO W/OUT THROMBIN - (6EA/CA)

## (undated) DEVICE — SODIUM CHL. INJ. 0.9% 500ML (24EA/CA 50CA/PF)

## (undated) DEVICE — KIT ROOM DECONTAMINATION

## (undated) DEVICE — PACK VEIN - (19/CA)

## (undated) DEVICE — KIT RADIAL ARTERY 20GA W/MAX BARRIER AND BIOPATCH  (5EA/CA) #10740 IS FOR THE SET RADIAL ARTERIAL

## (undated) DEVICE — DRESSING TRANSPARENT FILM TEGADERM 2.375 X 2.75"  (100EA/BX)"

## (undated) DEVICE — FIBRILLAR SURGICEL 4X4 - 10/CA

## (undated) DEVICE — GUIDE TRACHE TUBE INTUBATING STYLET 5.0-10.0MM 14FR (20EA/PK)

## (undated) DEVICE — HEAD HOLDER JUNIOR/ADULT

## (undated) DEVICE — BAG DECANTER (50EA/CS)

## (undated) DEVICE — BITE BLOCK ADULT 60FR (100EA/CA)

## (undated) DEVICE — CATHETER THERMALDILUTION SWAN - (5EA/CA)

## (undated) DEVICE — BAG RESUSCITATION DISPOSABLE - WITH MASK (10 EA/CA)

## (undated) DEVICE — SYRINGE 30 ML LL (56/BX)

## (undated) DEVICE — PACK CV DRAPING/BASIN 2PART - (1/CA)

## (undated) DEVICE — KIT ENDOHARVEST SYSTEM 8 - MUST ORDER 5 AT A TIME

## (undated) DEVICE — STOPCOCK MALE 4-WAY - (50/CA)

## (undated) DEVICE — LACTATED RINGERS INJ 1000 ML - (14EA/CA 60CA/PF)

## (undated) DEVICE — SPRING BULLDOG 1/2 FORCE BLUE - (10/BX)

## (undated) DEVICE — SENSOR CEREBRAL AND SOMATIC MONITORING (20/CA)

## (undated) DEVICE — SET LEADWIRE 5 LEAD BEDSIDE DISPOSABLE ECG (1SET OF 5/EA)

## (undated) DEVICE — BLADE SURGICAL #15 - (50/BX 3BX/CA)

## (undated) DEVICE — KIT INTROPERCUTANEOUS SHEATH - 8.5 FR W/MAX BARRIER AND BIOPATCH  (5/CA)

## (undated) DEVICE — SUTURE OHS

## (undated) DEVICE — CONNECTOR HUBLESS DRAINAGE - ONE WAY (20/BX)

## (undated) DEVICE — SET BIFURCATED BLOOD - (48EA/CS)

## (undated) DEVICE — KIT CUSTOM PROCEDURE SINGLE FOR ENDO  (15/CA)

## (undated) DEVICE — CANISTER SUCTION 3000ML MECHANICAL FILTER AUTO SHUTOFF MEDI-VAC NONSTERILE LF DISP  (40EA/CA)

## (undated) DEVICE — BLADE BEAVER 6900 MINI (OHS) - 180 DEGREE (20/BX) DUPLICATE ITEM ORDER 3700

## (undated) DEVICE — INSERT STEALTH #5 - (10/BX)